# Patient Record
Sex: MALE | Race: WHITE | NOT HISPANIC OR LATINO
[De-identification: names, ages, dates, MRNs, and addresses within clinical notes are randomized per-mention and may not be internally consistent; named-entity substitution may affect disease eponyms.]

---

## 2017-02-16 ENCOUNTER — MEDICATION RENEWAL (OUTPATIENT)
Age: 82
End: 2017-02-16

## 2017-02-21 ENCOUNTER — MEDICATION RENEWAL (OUTPATIENT)
Age: 82
End: 2017-02-21

## 2017-03-13 ENCOUNTER — RX RENEWAL (OUTPATIENT)
Age: 82
End: 2017-03-13

## 2017-03-16 ENCOUNTER — MEDICATION RENEWAL (OUTPATIENT)
Age: 82
End: 2017-03-16

## 2017-04-13 ENCOUNTER — RX RENEWAL (OUTPATIENT)
Age: 82
End: 2017-04-13

## 2017-04-19 ENCOUNTER — RX RENEWAL (OUTPATIENT)
Age: 82
End: 2017-04-19

## 2017-04-20 ENCOUNTER — MEDICATION RENEWAL (OUTPATIENT)
Age: 82
End: 2017-04-20

## 2017-04-21 ENCOUNTER — RX RENEWAL (OUTPATIENT)
Age: 82
End: 2017-04-21

## 2017-04-24 ENCOUNTER — APPOINTMENT (OUTPATIENT)
Dept: CARDIOLOGY | Facility: CLINIC | Age: 82
End: 2017-04-24

## 2017-04-24 ENCOUNTER — NON-APPOINTMENT (OUTPATIENT)
Age: 82
End: 2017-04-24

## 2017-04-24 VITALS
BODY MASS INDEX: 24.76 KG/M2 | WEIGHT: 148.6 LBS | TEMPERATURE: 97.5 F | OXYGEN SATURATION: 97 % | DIASTOLIC BLOOD PRESSURE: 52 MMHG | HEIGHT: 65 IN | HEART RATE: 58 BPM | SYSTOLIC BLOOD PRESSURE: 137 MMHG

## 2017-04-25 LAB
ALBUMIN SERPL ELPH-MCNC: 4 G/DL
ALP BLD-CCNC: 71 U/L
ALT SERPL-CCNC: 13 U/L
ANION GAP SERPL CALC-SCNC: 23 MMOL/L
AST SERPL-CCNC: 20 U/L
BASOPHILS # BLD AUTO: 0.04 K/UL
BASOPHILS NFR BLD AUTO: 0.6 %
BILIRUB SERPL-MCNC: 1.3 MG/DL
BUN SERPL-MCNC: 26 MG/DL
CALCIUM SERPL-MCNC: 10 MG/DL
CHLORIDE SERPL-SCNC: 104 MMOL/L
CHOLEST SERPL-MCNC: 191 MG/DL
CHOLEST/HDLC SERPL: 3 RATIO
CO2 SERPL-SCNC: 21 MMOL/L
CREAT SERPL-MCNC: 1.28 MG/DL
EOSINOPHIL # BLD AUTO: 0.13 K/UL
EOSINOPHIL NFR BLD AUTO: 2 %
GLUCOSE SERPL-MCNC: 40 MG/DL
HBA1C MFR BLD HPLC: 5.8 %
HCT VFR BLD CALC: 40.9 %
HDLC SERPL-MCNC: 63 MG/DL
HGB BLD-MCNC: 13 G/DL
IMM GRANULOCYTES NFR BLD AUTO: 0.2 %
LDLC SERPL CALC-MCNC: 81 MG/DL
LYMPHOCYTES # BLD AUTO: 1.37 K/UL
LYMPHOCYTES NFR BLD AUTO: 21.5 %
MAN DIFF?: NORMAL
MCHC RBC-ENTMCNC: 30 PG
MCHC RBC-ENTMCNC: 31.8 GM/DL
MCV RBC AUTO: 94.2 FL
MONOCYTES # BLD AUTO: 0.78 K/UL
MONOCYTES NFR BLD AUTO: 12.3 %
NEUTROPHILS # BLD AUTO: 4.03 K/UL
NEUTROPHILS NFR BLD AUTO: 63.4 %
NT-PROBNP SERPL-MCNC: 136 PG/ML
PLATELET # BLD AUTO: 152 K/UL
POTASSIUM SERPL-SCNC: 4.4 MMOL/L
PROT SERPL-MCNC: 6.8 G/DL
RBC # BLD: 4.34 M/UL
RBC # FLD: 14 %
SODIUM SERPL-SCNC: 148 MMOL/L
TRIGL SERPL-MCNC: 233 MG/DL
TSH SERPL-ACNC: 1.68 UIU/ML
WBC # FLD AUTO: 6.36 K/UL

## 2017-05-15 ENCOUNTER — RX RENEWAL (OUTPATIENT)
Age: 82
End: 2017-05-15

## 2017-06-20 ENCOUNTER — MEDICATION RENEWAL (OUTPATIENT)
Age: 82
End: 2017-06-20

## 2017-07-24 ENCOUNTER — RX RENEWAL (OUTPATIENT)
Age: 82
End: 2017-07-24

## 2017-08-08 ENCOUNTER — MEDICATION RENEWAL (OUTPATIENT)
Age: 82
End: 2017-08-08

## 2017-08-28 ENCOUNTER — NON-APPOINTMENT (OUTPATIENT)
Age: 82
End: 2017-08-28

## 2017-08-28 ENCOUNTER — APPOINTMENT (OUTPATIENT)
Dept: CARDIOLOGY | Facility: CLINIC | Age: 82
End: 2017-08-28
Payer: MEDICARE

## 2017-08-28 VITALS
WEIGHT: 153 LBS | OXYGEN SATURATION: 100 % | DIASTOLIC BLOOD PRESSURE: 61 MMHG | SYSTOLIC BLOOD PRESSURE: 113 MMHG | BODY MASS INDEX: 25.49 KG/M2 | HEIGHT: 65 IN | HEART RATE: 48 BPM | TEMPERATURE: 97.5 F

## 2017-08-28 PROCEDURE — 93000 ELECTROCARDIOGRAM COMPLETE: CPT

## 2017-08-28 PROCEDURE — 99214 OFFICE O/P EST MOD 30 MIN: CPT

## 2017-10-10 ENCOUNTER — MEDICATION RENEWAL (OUTPATIENT)
Age: 82
End: 2017-10-10

## 2017-10-11 ENCOUNTER — MEDICATION RENEWAL (OUTPATIENT)
Age: 82
End: 2017-10-11

## 2017-10-18 ENCOUNTER — RX RENEWAL (OUTPATIENT)
Age: 82
End: 2017-10-18

## 2017-11-06 ENCOUNTER — APPOINTMENT (OUTPATIENT)
Dept: CT IMAGING | Facility: IMAGING CENTER | Age: 82
End: 2017-11-06
Payer: MEDICARE

## 2017-11-06 ENCOUNTER — APPOINTMENT (OUTPATIENT)
Dept: NUCLEAR MEDICINE | Facility: IMAGING CENTER | Age: 82
End: 2017-11-06
Payer: MEDICARE

## 2017-11-06 ENCOUNTER — OUTPATIENT (OUTPATIENT)
Dept: OUTPATIENT SERVICES | Facility: HOSPITAL | Age: 82
LOS: 1 days | End: 2017-11-06
Payer: MEDICARE

## 2017-11-06 DIAGNOSIS — Z00.8 ENCOUNTER FOR OTHER GENERAL EXAMINATION: ICD-10-CM

## 2017-11-06 PROCEDURE — 78306 BONE IMAGING WHOLE BODY: CPT | Mod: 26

## 2017-11-06 PROCEDURE — 78306 BONE IMAGING WHOLE BODY: CPT

## 2017-11-06 PROCEDURE — 74177 CT ABD & PELVIS W/CONTRAST: CPT | Mod: 26

## 2017-11-06 PROCEDURE — 78999 UNLISTED MISC PX DX NUC MED: CPT

## 2017-11-06 PROCEDURE — 78320: CPT | Mod: 26

## 2017-11-06 PROCEDURE — A9561: CPT

## 2017-11-06 PROCEDURE — 74177 CT ABD & PELVIS W/CONTRAST: CPT

## 2017-11-08 ENCOUNTER — RX RENEWAL (OUTPATIENT)
Age: 82
End: 2017-11-08

## 2017-11-21 ENCOUNTER — CLINICAL ADVICE (OUTPATIENT)
Age: 82
End: 2017-11-21

## 2017-12-03 ENCOUNTER — RX RENEWAL (OUTPATIENT)
Age: 82
End: 2017-12-03

## 2017-12-13 ENCOUNTER — MEDICATION RENEWAL (OUTPATIENT)
Age: 82
End: 2017-12-13

## 2017-12-19 ENCOUNTER — RX RENEWAL (OUTPATIENT)
Age: 82
End: 2017-12-19

## 2018-02-07 ENCOUNTER — RX RENEWAL (OUTPATIENT)
Age: 83
End: 2018-02-07

## 2018-02-20 ENCOUNTER — NON-APPOINTMENT (OUTPATIENT)
Age: 83
End: 2018-02-20

## 2018-02-20 ENCOUNTER — APPOINTMENT (OUTPATIENT)
Dept: CARDIOLOGY | Facility: CLINIC | Age: 83
End: 2018-02-20
Payer: MEDICARE

## 2018-02-20 VITALS
DIASTOLIC BLOOD PRESSURE: 61 MMHG | BODY MASS INDEX: 25.66 KG/M2 | OXYGEN SATURATION: 97 % | HEIGHT: 65 IN | SYSTOLIC BLOOD PRESSURE: 129 MMHG | HEART RATE: 63 BPM | TEMPERATURE: 98.1 F | WEIGHT: 154 LBS

## 2018-02-20 PROCEDURE — 36415 COLL VENOUS BLD VENIPUNCTURE: CPT

## 2018-02-20 PROCEDURE — 93000 ELECTROCARDIOGRAM COMPLETE: CPT

## 2018-02-20 PROCEDURE — 99215 OFFICE O/P EST HI 40 MIN: CPT

## 2018-02-21 ENCOUNTER — RX RENEWAL (OUTPATIENT)
Age: 83
End: 2018-02-21

## 2018-02-21 LAB
ALBUMIN SERPL ELPH-MCNC: 4 G/DL
ALP BLD-CCNC: 83 U/L
ALT SERPL-CCNC: 10 U/L
ANION GAP SERPL CALC-SCNC: 16 MMOL/L
AST SERPL-CCNC: 15 U/L
BILIRUB SERPL-MCNC: 1.2 MG/DL
BUN SERPL-MCNC: 32 MG/DL
CALCIUM SERPL-MCNC: 9.9 MG/DL
CHLORIDE SERPL-SCNC: 106 MMOL/L
CO2 SERPL-SCNC: 22 MMOL/L
CREAT SERPL-MCNC: 1.29 MG/DL
POTASSIUM SERPL-SCNC: 4.5 MMOL/L
PROT SERPL-MCNC: 6.1 G/DL
SODIUM SERPL-SCNC: 144 MMOL/L

## 2018-02-22 ENCOUNTER — NON-APPOINTMENT (OUTPATIENT)
Age: 83
End: 2018-02-22

## 2018-02-22 LAB
BASOPHILS # BLD AUTO: 0.03 K/UL
BASOPHILS NFR BLD AUTO: 0.5 %
CHOLEST SERPL-MCNC: 157 MG/DL
CHOLEST/HDLC SERPL: 2.7 RATIO
EOSINOPHIL # BLD AUTO: 0.13 K/UL
EOSINOPHIL NFR BLD AUTO: 2 %
GLUCOSE SERPL-MCNC: 106 MG/DL
HBA1C MFR BLD HPLC: 5.9 %
HCT VFR BLD CALC: 35.7 %
HDLC SERPL-MCNC: 58 MG/DL
HGB BLD-MCNC: 11.5 G/DL
IMM GRANULOCYTES NFR BLD AUTO: 0.2 %
LDLC SERPL CALC-MCNC: 46 MG/DL
LYMPHOCYTES # BLD AUTO: 1.22 K/UL
LYMPHOCYTES NFR BLD AUTO: 18.8 %
MAN DIFF?: NORMAL
MCHC RBC-ENTMCNC: 29.9 PG
MCHC RBC-ENTMCNC: 32.2 GM/DL
MCV RBC AUTO: 93 FL
MONOCYTES # BLD AUTO: 0.7 K/UL
MONOCYTES NFR BLD AUTO: 10.8 %
NEUTROPHILS # BLD AUTO: 4.4 K/UL
NEUTROPHILS NFR BLD AUTO: 67.7 %
NT-PROBNP SERPL-MCNC: 655 PG/ML
PLATELET # BLD AUTO: 142 K/UL
RBC # BLD: 3.84 M/UL
RBC # FLD: 14.5 %
TRIGL SERPL-MCNC: 267 MG/DL
TSH SERPL-ACNC: 1.52 UIU/ML
WBC # FLD AUTO: 6.49 K/UL

## 2018-02-26 ENCOUNTER — RX RENEWAL (OUTPATIENT)
Age: 83
End: 2018-02-26

## 2018-03-08 ENCOUNTER — APPOINTMENT (OUTPATIENT)
Dept: CARDIOLOGY | Facility: CLINIC | Age: 83
End: 2018-03-08
Payer: MEDICARE

## 2018-03-08 VITALS
OXYGEN SATURATION: 98 % | WEIGHT: 156 LBS | DIASTOLIC BLOOD PRESSURE: 65 MMHG | SYSTOLIC BLOOD PRESSURE: 138 MMHG | HEIGHT: 65 IN | HEART RATE: 62 BPM | BODY MASS INDEX: 25.99 KG/M2 | TEMPERATURE: 97.6 F

## 2018-03-08 PROCEDURE — 99214 OFFICE O/P EST MOD 30 MIN: CPT

## 2018-03-20 ENCOUNTER — APPOINTMENT (OUTPATIENT)
Dept: CARDIOLOGY | Facility: CLINIC | Age: 83
End: 2018-03-20
Payer: MEDICARE

## 2018-03-20 ENCOUNTER — NON-APPOINTMENT (OUTPATIENT)
Age: 83
End: 2018-03-20

## 2018-03-20 VITALS
OXYGEN SATURATION: 99 % | DIASTOLIC BLOOD PRESSURE: 65 MMHG | WEIGHT: 152 LBS | TEMPERATURE: 98.3 F | BODY MASS INDEX: 25.29 KG/M2 | SYSTOLIC BLOOD PRESSURE: 125 MMHG | HEART RATE: 63 BPM

## 2018-03-20 PROCEDURE — 93000 ELECTROCARDIOGRAM COMPLETE: CPT

## 2018-03-20 PROCEDURE — 36415 COLL VENOUS BLD VENIPUNCTURE: CPT

## 2018-03-20 PROCEDURE — 99214 OFFICE O/P EST MOD 30 MIN: CPT

## 2018-03-21 ENCOUNTER — CLINICAL ADVICE (OUTPATIENT)
Age: 83
End: 2018-03-21

## 2018-03-21 LAB
ALBUMIN SERPL ELPH-MCNC: 4.1 G/DL
ALP BLD-CCNC: 84 U/L
ALT SERPL-CCNC: 12 U/L
ANION GAP SERPL CALC-SCNC: 14 MMOL/L
AST SERPL-CCNC: 14 U/L
BASOPHILS # BLD AUTO: 0.03 K/UL
BASOPHILS NFR BLD AUTO: 0.4 %
BILIRUB SERPL-MCNC: 0.8 MG/DL
BUN SERPL-MCNC: 43 MG/DL
CALCIUM SERPL-MCNC: 9.3 MG/DL
CHLORIDE SERPL-SCNC: 103 MMOL/L
CO2 SERPL-SCNC: 22 MMOL/L
CREAT SERPL-MCNC: 1.71 MG/DL
EOSINOPHIL # BLD AUTO: 0.12 K/UL
EOSINOPHIL NFR BLD AUTO: 1.7 %
HCT VFR BLD CALC: 33.7 %
HGB BLD-MCNC: 11.1 G/DL
IMM GRANULOCYTES NFR BLD AUTO: 0.3 %
LYMPHOCYTES # BLD AUTO: 1.21 K/UL
LYMPHOCYTES NFR BLD AUTO: 17 %
MAN DIFF?: NORMAL
MCHC RBC-ENTMCNC: 30.2 PG
MCHC RBC-ENTMCNC: 32.9 GM/DL
MCV RBC AUTO: 91.8 FL
MONOCYTES # BLD AUTO: 0.87 K/UL
MONOCYTES NFR BLD AUTO: 12.2 %
NEUTROPHILS # BLD AUTO: 4.87 K/UL
NEUTROPHILS NFR BLD AUTO: 68.4 %
PLATELET # BLD AUTO: 142 K/UL
POTASSIUM SERPL-SCNC: 5.7 MMOL/L
PROT SERPL-MCNC: 6.6 G/DL
RBC # BLD: 3.67 M/UL
RBC # FLD: 14.1 %
SODIUM SERPL-SCNC: 139 MMOL/L
WBC # FLD AUTO: 7.12 K/UL

## 2018-04-09 ENCOUNTER — NON-APPOINTMENT (OUTPATIENT)
Age: 83
End: 2018-04-09

## 2018-04-09 ENCOUNTER — APPOINTMENT (OUTPATIENT)
Dept: CARDIOLOGY | Facility: CLINIC | Age: 83
End: 2018-04-09
Payer: MEDICARE

## 2018-04-09 VITALS
OXYGEN SATURATION: 96 % | DIASTOLIC BLOOD PRESSURE: 64 MMHG | BODY MASS INDEX: 26.82 KG/M2 | HEART RATE: 69 BPM | SYSTOLIC BLOOD PRESSURE: 127 MMHG | HEIGHT: 65 IN | WEIGHT: 161 LBS

## 2018-04-09 VITALS — HEART RATE: 70 BPM | SYSTOLIC BLOOD PRESSURE: 120 MMHG | DIASTOLIC BLOOD PRESSURE: 70 MMHG

## 2018-04-09 VITALS — DIASTOLIC BLOOD PRESSURE: 64 MMHG | HEART RATE: 70 BPM | SYSTOLIC BLOOD PRESSURE: 110 MMHG

## 2018-04-09 PROCEDURE — 93000 ELECTROCARDIOGRAM COMPLETE: CPT

## 2018-04-09 PROCEDURE — 99214 OFFICE O/P EST MOD 30 MIN: CPT

## 2018-04-09 PROCEDURE — 36415 COLL VENOUS BLD VENIPUNCTURE: CPT

## 2018-04-10 LAB
ALBUMIN SERPL ELPH-MCNC: 3.1 G/DL
ALP BLD-CCNC: 86 U/L
ALT SERPL-CCNC: 16 U/L
ANION GAP SERPL CALC-SCNC: 18 MMOL/L
AST SERPL-CCNC: 20 U/L
BASOPHILS # BLD AUTO: 0.02 K/UL
BASOPHILS NFR BLD AUTO: 0.2 %
BILIRUB SERPL-MCNC: 1.2 MG/DL
BUN SERPL-MCNC: 30 MG/DL
CALCIUM SERPL-MCNC: 9.4 MG/DL
CHLORIDE SERPL-SCNC: 102 MMOL/L
CO2 SERPL-SCNC: 19 MMOL/L
CREAT SERPL-MCNC: 1.26 MG/DL
EOSINOPHIL # BLD AUTO: 0.2 K/UL
EOSINOPHIL NFR BLD AUTO: 2.3 %
GLUCOSE SERPL-MCNC: 99 MG/DL
HCT VFR BLD CALC: 33.3 %
HGB BLD-MCNC: 10.2 G/DL
IMM GRANULOCYTES NFR BLD AUTO: 0.5 %
LYMPHOCYTES # BLD AUTO: 1.03 K/UL
LYMPHOCYTES NFR BLD AUTO: 12 %
MAN DIFF?: NORMAL
MCHC RBC-ENTMCNC: 29 PG
MCHC RBC-ENTMCNC: 30.6 GM/DL
MCV RBC AUTO: 94.6 FL
MONOCYTES # BLD AUTO: 1.01 K/UL
MONOCYTES NFR BLD AUTO: 11.8 %
NEUTROPHILS # BLD AUTO: 6.29 K/UL
NEUTROPHILS NFR BLD AUTO: 73.2 %
NT-PROBNP SERPL-MCNC: 1718 PG/ML
PLATELET # BLD AUTO: 232 K/UL
POTASSIUM SERPL-SCNC: 5.1 MMOL/L
PROT SERPL-MCNC: 6.5 G/DL
RBC # BLD: 3.52 M/UL
RBC # FLD: 14.5 %
SODIUM SERPL-SCNC: 139 MMOL/L
WBC # FLD AUTO: 8.59 K/UL

## 2018-04-12 RX ORDER — AZTREONAM 2 G
1 VIAL (EA) INJECTION
Qty: 0 | Refills: 0 | COMMUNITY
Start: 2018-04-12

## 2018-04-13 ENCOUNTER — MEDICATION RENEWAL (OUTPATIENT)
Age: 83
End: 2018-04-13

## 2018-04-14 ENCOUNTER — INPATIENT (INPATIENT)
Facility: HOSPITAL | Age: 83
LOS: 4 days | Discharge: HOME | DRG: 291 | End: 2018-04-19
Attending: INTERNAL MEDICINE | Admitting: INTERNAL MEDICINE
Payer: MEDICARE

## 2018-04-14 VITALS
OXYGEN SATURATION: 96 % | DIASTOLIC BLOOD PRESSURE: 66 MMHG | HEART RATE: 96 BPM | SYSTOLIC BLOOD PRESSURE: 125 MMHG | RESPIRATION RATE: 28 BRPM

## 2018-04-14 DIAGNOSIS — J96.01 ACUTE RESPIRATORY FAILURE WITH HYPOXIA: ICD-10-CM

## 2018-04-14 DIAGNOSIS — E78.5 HYPERLIPIDEMIA, UNSPECIFIED: ICD-10-CM

## 2018-04-14 DIAGNOSIS — I50.9 HEART FAILURE, UNSPECIFIED: ICD-10-CM

## 2018-04-14 DIAGNOSIS — C79.9 SECONDARY MALIGNANT NEOPLASM OF UNSPECIFIED SITE: ICD-10-CM

## 2018-04-14 DIAGNOSIS — J98.8 OTHER SPECIFIED RESPIRATORY DISORDERS: ICD-10-CM

## 2018-04-14 DIAGNOSIS — E44.0 MODERATE PROTEIN-CALORIE MALNUTRITION: ICD-10-CM

## 2018-04-14 DIAGNOSIS — N17.9 ACUTE KIDNEY FAILURE, UNSPECIFIED: ICD-10-CM

## 2018-04-14 DIAGNOSIS — R14.0 ABDOMINAL DISTENSION (GASEOUS): ICD-10-CM

## 2018-04-14 LAB
ALBUMIN SERPL ELPH-MCNC: 3 G/DL — LOW (ref 3.3–5)
ALP SERPL-CCNC: 95 U/L — SIGNIFICANT CHANGE UP (ref 40–120)
ALT FLD-CCNC: 26 U/L RC — SIGNIFICANT CHANGE UP (ref 10–45)
ANION GAP SERPL CALC-SCNC: 11 MMOL/L — SIGNIFICANT CHANGE UP (ref 5–17)
APPEARANCE UR: CLEAR — SIGNIFICANT CHANGE UP
AST SERPL-CCNC: 41 U/L — HIGH (ref 10–40)
BASE EXCESS BLDV CALC-SCNC: -0.1 MMOL/L — SIGNIFICANT CHANGE UP (ref -2–2)
BASOPHILS # BLD AUTO: 0 K/UL — SIGNIFICANT CHANGE UP (ref 0–0.2)
BASOPHILS NFR BLD AUTO: 0.3 % — SIGNIFICANT CHANGE UP (ref 0–2)
BILIRUB SERPL-MCNC: 0.4 MG/DL — SIGNIFICANT CHANGE UP (ref 0.2–1.2)
BILIRUB UR-MCNC: NEGATIVE — SIGNIFICANT CHANGE UP
BUN SERPL-MCNC: 28 MG/DL — HIGH (ref 7–23)
CA-I SERPL-SCNC: 1.11 MMOL/L — LOW (ref 1.12–1.3)
CALCIUM SERPL-MCNC: 8.6 MG/DL — SIGNIFICANT CHANGE UP (ref 8.4–10.5)
CHLORIDE BLDV-SCNC: 111 MMOL/L — HIGH (ref 96–108)
CHLORIDE SERPL-SCNC: 105 MMOL/L — SIGNIFICANT CHANGE UP (ref 96–108)
CK MB BLD-MCNC: 1.7 % — SIGNIFICANT CHANGE UP (ref 0–3.5)
CK MB CFR SERPL CALC: 2.5 NG/ML — SIGNIFICANT CHANGE UP (ref 0–6.7)
CK SERPL-CCNC: 143 U/L — SIGNIFICANT CHANGE UP (ref 30–200)
CO2 BLDV-SCNC: 28 MMOL/L — SIGNIFICANT CHANGE UP (ref 22–30)
CO2 SERPL-SCNC: 23 MMOL/L — SIGNIFICANT CHANGE UP (ref 22–31)
COLOR SPEC: YELLOW — SIGNIFICANT CHANGE UP
CREAT SERPL-MCNC: 1.42 MG/DL — HIGH (ref 0.5–1.3)
DIFF PNL FLD: ABNORMAL
EOSINOPHIL # BLD AUTO: 0.1 K/UL — SIGNIFICANT CHANGE UP (ref 0–0.5)
EOSINOPHIL NFR BLD AUTO: 2.5 % — SIGNIFICANT CHANGE UP (ref 0–6)
FLUAV H1 2009 PAND RNA SPEC QL NAA+PROBE: SIGNIFICANT CHANGE UP
GAS PNL BLDV: 131 MMOL/L — LOW (ref 136–145)
GAS PNL BLDV: SIGNIFICANT CHANGE UP
GAS PNL BLDV: SIGNIFICANT CHANGE UP
GLUCOSE BLDV-MCNC: 149 MG/DL — HIGH (ref 70–99)
GLUCOSE SERPL-MCNC: 156 MG/DL — HIGH (ref 70–99)
GLUCOSE UR QL: NEGATIVE MG/DL — SIGNIFICANT CHANGE UP
HCO3 BLDV-SCNC: 26 MMOL/L — SIGNIFICANT CHANGE UP (ref 21–29)
HCT VFR BLD CALC: 33.7 % — LOW (ref 39–50)
HCT VFR BLDA CALC: 42 % — SIGNIFICANT CHANGE UP (ref 39–50)
HGB BLD CALC-MCNC: 13.7 G/DL — SIGNIFICANT CHANGE UP (ref 13–17)
HGB BLD-MCNC: 10.8 G/DL — LOW (ref 13–17)
HOROWITZ INDEX BLDV+IHG-RTO: SIGNIFICANT CHANGE UP
HPIV3 RNA SPEC QL NAA+PROBE: DETECTED
KETONES UR-MCNC: NEGATIVE — SIGNIFICANT CHANGE UP
LACTATE BLDV-MCNC: 2.9 MMOL/L — HIGH (ref 0.7–2)
LEUKOCYTE ESTERASE UR-ACNC: ABNORMAL
LYMPHOCYTES # BLD AUTO: 1.5 K/UL — SIGNIFICANT CHANGE UP (ref 1–3.3)
LYMPHOCYTES # BLD AUTO: 31.7 % — SIGNIFICANT CHANGE UP (ref 13–44)
MCHC RBC-ENTMCNC: 30.2 PG — SIGNIFICANT CHANGE UP (ref 27–34)
MCHC RBC-ENTMCNC: 32 GM/DL — SIGNIFICANT CHANGE UP (ref 32–36)
MCV RBC AUTO: 94.4 FL — SIGNIFICANT CHANGE UP (ref 80–100)
MONOCYTES # BLD AUTO: 0.5 K/UL — SIGNIFICANT CHANGE UP (ref 0–0.9)
MONOCYTES NFR BLD AUTO: 11.4 % — SIGNIFICANT CHANGE UP (ref 2–14)
NEUTROPHILS # BLD AUTO: 2.6 K/UL — SIGNIFICANT CHANGE UP (ref 1.8–7.4)
NEUTROPHILS NFR BLD AUTO: 54.1 % — SIGNIFICANT CHANGE UP (ref 43–77)
NITRITE UR-MCNC: NEGATIVE — SIGNIFICANT CHANGE UP
NT-PROBNP SERPL-SCNC: 1229 PG/ML — HIGH (ref 0–300)
PCO2 BLDV: 52 MMHG — HIGH (ref 35–50)
PH BLDV: 7.33 — LOW (ref 7.35–7.45)
PH UR: 6.5 — SIGNIFICANT CHANGE UP (ref 5–8)
PLATELET # BLD AUTO: 215 K/UL — SIGNIFICANT CHANGE UP (ref 150–400)
PO2 BLDV: 25 MMHG — SIGNIFICANT CHANGE UP (ref 25–45)
POTASSIUM BLDV-SCNC: 3.9 MMOL/L — SIGNIFICANT CHANGE UP (ref 3.5–5)
POTASSIUM SERPL-MCNC: 4.2 MMOL/L — SIGNIFICANT CHANGE UP (ref 3.5–5.3)
POTASSIUM SERPL-SCNC: 4.2 MMOL/L — SIGNIFICANT CHANGE UP (ref 3.5–5.3)
PROT SERPL-MCNC: 6.1 G/DL — SIGNIFICANT CHANGE UP (ref 6–8.3)
PROT UR-MCNC: NEGATIVE MG/DL — SIGNIFICANT CHANGE UP
RAPID RVP RESULT: DETECTED
RBC # BLD: 3.57 M/UL — LOW (ref 4.2–5.8)
RBC # FLD: 12.8 % — SIGNIFICANT CHANGE UP (ref 10.3–14.5)
SAO2 % BLDV: 34 % — LOW (ref 67–88)
SODIUM SERPL-SCNC: 139 MMOL/L — SIGNIFICANT CHANGE UP (ref 135–145)
SP GR SPEC: 1.01 — SIGNIFICANT CHANGE UP (ref 1.01–1.02)
TROPONIN T SERPL-MCNC: 0.03 NG/ML — SIGNIFICANT CHANGE UP (ref 0–0.06)
TROPONIN T SERPL-MCNC: <0.01 NG/ML — SIGNIFICANT CHANGE UP (ref 0–0.06)
UROBILINOGEN FLD QL: NEGATIVE MG/DL — SIGNIFICANT CHANGE UP
WBC # BLD: 4.8 K/UL — SIGNIFICANT CHANGE UP (ref 3.8–10.5)
WBC # FLD AUTO: 4.8 K/UL — SIGNIFICANT CHANGE UP (ref 3.8–10.5)

## 2018-04-14 PROCEDURE — 71045 X-RAY EXAM CHEST 1 VIEW: CPT | Mod: 26

## 2018-04-14 PROCEDURE — 99285 EMERGENCY DEPT VISIT HI MDM: CPT | Mod: GC

## 2018-04-14 PROCEDURE — 71045 X-RAY EXAM CHEST 1 VIEW: CPT | Mod: 26,77

## 2018-04-14 RX ORDER — TAMSULOSIN HYDROCHLORIDE 0.4 MG/1
0.8 CAPSULE ORAL DAILY
Qty: 0 | Refills: 0 | Status: DISCONTINUED | OUTPATIENT
Start: 2018-04-14 | End: 2018-04-19

## 2018-04-14 RX ORDER — SODIUM CHLORIDE 9 MG/ML
3 INJECTION INTRAMUSCULAR; INTRAVENOUS; SUBCUTANEOUS ONCE
Qty: 0 | Refills: 0 | Status: COMPLETED | OUTPATIENT
Start: 2018-04-14 | End: 2018-04-14

## 2018-04-14 RX ORDER — ATORVASTATIN CALCIUM 80 MG/1
20 TABLET, FILM COATED ORAL AT BEDTIME
Qty: 0 | Refills: 0 | Status: DISCONTINUED | OUTPATIENT
Start: 2018-04-14 | End: 2018-04-19

## 2018-04-14 RX ORDER — ACETAMINOPHEN 500 MG
650 TABLET ORAL EVERY 6 HOURS
Qty: 0 | Refills: 0 | Status: DISCONTINUED | OUTPATIENT
Start: 2018-04-14 | End: 2018-04-19

## 2018-04-14 RX ORDER — PANTOPRAZOLE SODIUM 20 MG/1
40 TABLET, DELAYED RELEASE ORAL
Qty: 0 | Refills: 0 | Status: DISCONTINUED | OUTPATIENT
Start: 2018-04-14 | End: 2018-04-19

## 2018-04-14 RX ORDER — MULTIVIT-MIN/FERROUS GLUCONATE 9 MG/15 ML
1 LIQUID (ML) ORAL DAILY
Qty: 0 | Refills: 0 | Status: DISCONTINUED | OUTPATIENT
Start: 2018-04-14 | End: 2018-04-19

## 2018-04-14 RX ORDER — RIVAROXABAN 15 MG-20MG
15 KIT ORAL
Qty: 0 | Refills: 0 | Status: DISCONTINUED | OUTPATIENT
Start: 2018-04-14 | End: 2018-04-19

## 2018-04-14 RX ORDER — ZOLPIDEM TARTRATE 10 MG/1
5 TABLET ORAL AT BEDTIME
Qty: 0 | Refills: 0 | Status: DISCONTINUED | OUTPATIENT
Start: 2018-04-14 | End: 2018-04-14

## 2018-04-14 RX ORDER — IPRATROPIUM/ALBUTEROL SULFATE 18-103MCG
3 AEROSOL WITH ADAPTER (GRAM) INHALATION EVERY 6 HOURS
Qty: 0 | Refills: 0 | Status: DISCONTINUED | OUTPATIENT
Start: 2018-04-14 | End: 2018-04-19

## 2018-04-14 RX ORDER — HEPARIN SODIUM 5000 [USP'U]/ML
5000 INJECTION INTRAVENOUS; SUBCUTANEOUS EVERY 12 HOURS
Qty: 0 | Refills: 0 | Status: DISCONTINUED | OUTPATIENT
Start: 2018-04-14 | End: 2018-04-19

## 2018-04-14 RX ORDER — DOCUSATE SODIUM 100 MG
100 CAPSULE ORAL
Qty: 0 | Refills: 0 | Status: DISCONTINUED | OUTPATIENT
Start: 2018-04-14 | End: 2018-04-19

## 2018-04-14 RX ORDER — FUROSEMIDE 40 MG
40 TABLET ORAL ONCE
Qty: 0 | Refills: 0 | Status: COMPLETED | OUTPATIENT
Start: 2018-04-14 | End: 2018-04-14

## 2018-04-14 RX ORDER — FUROSEMIDE 40 MG
40 TABLET ORAL EVERY 12 HOURS
Qty: 0 | Refills: 0 | Status: DISCONTINUED | OUTPATIENT
Start: 2018-04-14 | End: 2018-04-16

## 2018-04-14 RX ORDER — GABAPENTIN 400 MG/1
300 CAPSULE ORAL
Qty: 0 | Refills: 0 | Status: DISCONTINUED | OUTPATIENT
Start: 2018-04-14 | End: 2018-04-19

## 2018-04-14 RX ORDER — ATENOLOL 25 MG/1
12.5 TABLET ORAL DAILY
Qty: 0 | Refills: 0 | Status: DISCONTINUED | OUTPATIENT
Start: 2018-04-14 | End: 2018-04-19

## 2018-04-14 RX ADMIN — Medication 40 MILLIGRAM(S): at 12:35

## 2018-04-14 RX ADMIN — SODIUM CHLORIDE 3 MILLILITER(S): 9 INJECTION INTRAMUSCULAR; INTRAVENOUS; SUBCUTANEOUS at 11:15

## 2018-04-14 RX ADMIN — HEPARIN SODIUM 5000 UNIT(S): 5000 INJECTION INTRAVENOUS; SUBCUTANEOUS at 18:26

## 2018-04-14 RX ADMIN — RIVAROXABAN 15 MILLIGRAM(S): KIT at 21:26

## 2018-04-14 RX ADMIN — Medication 40 MILLIGRAM(S): at 18:25

## 2018-04-14 RX ADMIN — GABAPENTIN 300 MILLIGRAM(S): 400 CAPSULE ORAL at 21:24

## 2018-04-14 RX ADMIN — ATORVASTATIN CALCIUM 20 MILLIGRAM(S): 80 TABLET, FILM COATED ORAL at 21:24

## 2018-04-14 RX ADMIN — Medication 0.62 MILLIGRAM(S): at 13:16

## 2018-04-14 RX ADMIN — Medication 0.62 MILLIGRAM(S): at 18:26

## 2018-04-14 RX ADMIN — TAMSULOSIN HYDROCHLORIDE 0.8 MILLIGRAM(S): 0.4 CAPSULE ORAL at 21:23

## 2018-04-14 NOTE — ED PROVIDER NOTE - OBJECTIVE STATEMENT
Private Physician Pacheco Tavarez, Urology, [facility]  89y male pmh  Metastatic Cancer of prostate, HLD,HTN, CAD/MI x2, on abx for uti, Pt comes to ed complains of cough past several weeks, began wheezing last night wihtout sputum, No nvdc,cp,fc,abd pain. No hx tobacco,asthma,chf, stopped diuretic 3 wk ago,without change in chronic pedal edema. Private Physician Pacheco Tavarez, Urology, [facility]  89y male pmh  Metastatic Cancer of prostate, HLD,HTN, CAD/MI x2, on abx for uti, Pt comes to ed complains of cough past several weeks, began wheezing last night wihtout sputum, No nvdc,cp,fc,abd pain. No hx tobacco,asthma,chf, stopped diuretic 3 wk ago,without change in chronic pedal edema.  primary care physician: Alex Batista

## 2018-04-14 NOTE — H&P ADULT - NSHPLABSRESULTS_GEN_ALL_CORE
LABS:                        10.8   4.8   )-----------( 215      ( 14 Apr 2018 11:53 )             33.7     04-14    139  |  105  |  28<H>  ----------------------------<  156<H>  4.2   |  23  |  1.42<H>    Ca    8.6      14 Apr 2018 11:53    TPro  6.1  /  Alb  3.0<L>  /  TBili  0.4  /  DBili  x   /  AST  41<H>  /  ALT  26  /  AlkPhos  95  04-14    CARDIAC MARKERS ( 14 Apr 2018 11:53 )  x     / <0.01 ng/mL / x     / x     / x              CAPILLARY BLOOD GLUCOSE            RADIOLOGY & ADDITIONAL STUDIES:  < from: Xray Chest 1 View AP/PA (04.14.18 @ 11:40) >      EXAM:  XR CHEST AP OR PA 1V                            PROCEDURE DATE:  04/14/2018            INTERPRETATION:    CLINICAL INDICATION: Chest pain    TECHNIQUE: AP view of the chest.    COMPARISON: None.     FINDINGS:     Cardiomediastinal silhouettewithin normal limits.   Bilateral opacities likely represents bilateral pleural effusions with   associated atelectasis and/or pneumonia.  No pneumothorax.   Degenerative changes of spine.     IMPRESSION:   Bilateral pleural effusions with associatedatelectasis and/or pneumonia.            < end of copied text >

## 2018-04-14 NOTE — H&P ADULT - ASSESSMENT
89 yo M from home with PMhx HTN, HLD, MI X 2, CHF of unspecified EF, Prostate Cx (?w/ mets), who p/w several days difficulty breathing, sob, productive cough of white sputum, increasing abdominal distention, and pedal edema.  Pt recently stopped taking diuretics 2/2 adverse effects.  In ED, pt w/ s/s fluid overload, with increasing work of breathing, s/p lasix, vasotec, and put on BiPAP.

## 2018-04-14 NOTE — H&P ADULT - HISTORY OF PRESENT ILLNESS
89 yo M from home with PMhx HTN, HLD, MI X 2, CHF of unspecified EF, Prostate Cx (?w/ mets), who p/w several days difficulty breathing, sob, productive cough of white sputum, increasing abdominal distention, and pedal edema.  Pt recently stopped taking diuretics 2/2 adverse effects.  In ED, pt w/ s/s fluid overload, with increasing work of breathing, s/p lasix, vasotec, and put on BiPAP.

## 2018-04-14 NOTE — H&P ADULT - NSHPPHYSICALEXAM_GEN_ALL_CORE
Vital Signs Last 24 Hrs  T(C): 36.8 (14 Apr 2018 11:20), Max: 36.8 (14 Apr 2018 11:20)  T(F): 98.2 (14 Apr 2018 11:20), Max: 98.2 (14 Apr 2018 11:20)  HR: 85 (14 Apr 2018 12:31) (85 - 96)  BP: 116/61 (14 Apr 2018 11:20) (116/61 - 125/66)  BP(mean): --  RR: 22 (14 Apr 2018 11:20) (22 - 28)  SpO2: 99% (14 Apr 2018 12:31) (95% - 99%)    Constitutional: ill appearing, on bipap, WN  HEENT: NC, AT  Neck:  Supple.   Respiratory:  b/l inspiratory rales, on bipap, mild tachypnea.   Cardiovascular:  RRR. systolic murmur, pedal edema, No R/G. 2+ radial pulses b/l.   Gastrointestinal: Soft, NT, distended, normoactive bs.  Extremities: WWP without cyanosis, clubbing.  Neurological:  Alert and awake.  Crude sensation intact.  No acute motor deficits.

## 2018-04-14 NOTE — ED PROVIDER NOTE - PROGRESS NOTE DETAILS
Call placed to primary care physician, Dr. Martin. primary care physician covering MD requested Dr. Yadav, he is out of town this wknd, will admit to unattached.

## 2018-04-14 NOTE — H&P ADULT - NSHPREVIEWOFSYSTEMS_GEN_ALL_CORE
REVIEW OF SYSTEMS:  CONSTITUTIONAL: (+) Malaise  EYES: No acute change in vision.  ENT:  No difficulty hearing, tinnitus, vertigo  NECK: No pain or stiffness  RESPIRATORY: sob, cough, wheezing. No hemoptysis  CARDIOVASCULAR: No palpitations, syncope  GASTROINTESTINAL: abd distention, No abdominal pain, N/V, hematemesis, diarrhea, melena, or hematochezia.  GENITOURINARY: No dysuria, frequency, hematuria  NEUROLOGICAL: No headaches, acute loss of strength, numbness, or tremors  SKIN: No rashes or lesions   LYMPH NODES: No enlarged glands  ENDOCRINE: No heat or cold intolerance  MUSCULOSKELETAL: No arthralgia, myalgia   PSYCHIATRIC: No suicidal or homicidal ideations  HEME/LYMPH: No easy bruising or bleeding   ALLERGY AND IMMUNOLOGIC: No hives or eczema

## 2018-04-14 NOTE — ED PROVIDER NOTE - MEDICAL DECISION MAKING DETAILS
Hx CAD, HTN, Hyperchol, p/w worsening SOB and LE swelling x 2 days, no CP, on exam bibasilar rales and 3+ LE edema, likely CHF exacerbation, will start Bipap, lasix, labs, admission.

## 2018-04-14 NOTE — CONSULT NOTE ADULT - SUBJECTIVE AND OBJECTIVE BOX
[facility]  CONSULT NOTE    REASON FOR CONSULT:    Asked to see this    HPI:    PMHX:  HLD (hyperlipidemia)  HTN (hypertension)  Cancer of prostate  MI, old      PSHX:      FAMILY HISTORY:      SOCIAL HISTORY:    Pulmonary Medications:   ALBUTerol/ipratropium for Nebulization 3 milliLiter(s) Nebulizer every 6 hours      Antimicrobials:      Cardiology:  ATENolol  Tablet 12.5 milliGRAM(s) Oral daily  enalaprilat Injectable 0.625 milliGRAM(s) IV Push every 6 hours  furosemide   Injectable 40 milliGRAM(s) IV Push every 12 hours  tamsulosin 0.8 milliGRAM(s) Oral daily      Other:  acetaminophen   Tablet. 650 milliGRAM(s) Oral every 6 hours PRN  atorvastatin 20 milliGRAM(s) Oral at bedtime  docusate sodium 100 milliGRAM(s) Oral two times a day  gabapentin 300 milliGRAM(s) Oral two times a day  heparin  Injectable 5000 Unit(s) SubCutaneous every 12 hours  multivitamin/minerals 1 Tablet(s) Oral daily  pantoprazole    Tablet 40 milliGRAM(s) Oral before breakfast  rivaroxaban 15 milliGRAM(s) Oral <User Schedule>  triazolam 0.25 milliGRAM(s) Oral at bedtime PRN      Allergies    penicillin (Unknown)    Intolerances        HOME MEDICATIONS:    REVIEW OF SYSTEMS:      Constitutional: Within normal limits  HEENT: Within normal limits  Neck: Within normal limits  Resp: No dyspnea at rest, dyspnea on exertion, chest congestion/wheeze, cough. stridor, sputum production, chest pain with respiration, hemoptysis  CV: No chest pain, chest pressure, chest discomfort, palpitations, lightheadedness/dizziness, syncope, lower extremity edema, inability to lay flat to sleep, awakening from sleep unable to sleep, claudication.  Extremities: Within normal limits  GI: Within normal limits  : Within normal limits   Psych/Neurological: Within normal limits  Musculoskeletal: Within normal limits  Hematologic: Within normal limits  Endocrinologic: Within normal limits   Skin: Within normal limits                                                                                                                                                                                [ ]Unable to obtain    OBJECTIVE:      ICU Vital Signs Last 24 Hrs  T(C): 36.4 (2018 19:46), Max: 36.8 (2018 11:20)  T(F): 97.6 (2018 19:46), Max: 98.2 (2018 11:20)  HR: 78 (2018 20:58) (71 - 98)  BP: 113/58 (2018 19:46) (113/58 - 125/66)  BP(mean): --  ABP: --  ABP(mean): --  RR: 18 (2018 19:46) (18 - 28)  SpO2: 98% (2018 20:58) (95% - 100%)      I&O's Detail    Daily     Daily   CAPILLARY BLOOD GLUCOSE          PHYSICAL EXAM:  General: Awake, alert, cooperative, no distress, appears stated age   Head: Atraumatic, normocephalic  Eyes: Anicteric, conjunctiva/corneas clear, EOM's intact, PERRL, both eyes               Ears: External examination WNL, both ears                Nose: Nares normal, septum midline, mucosa normal, no drainage or sinus tenderness  Throat: Mallampati Grade:     No tonsillar or pharyngeal exudates. Lips, mucosa and tongue WNL; teeth and gums WNL  Neck: Supple, symmetric, trachea midline; thyroid without enlargement/tenderness/nodules; no carotid bruit; no JVD  Back: Symmetric, no curvature, range of motion normal, no CVA tenderness  Chest wall: No tenderness or deformity  Respiratory: Respirations unlabored; Clear to auscultation and percussion bilaterally  Cardiovascular: Regular rate and rhythm, S1 S2 normal. No murmurs, rubs or gallops.   Abdomen: Soft, non-tender, non-distended. No organomegaly. No masses. Normal bowel sounds  Extremities: Warm to touch. No clubbing or cyanosis. No pedal edema.  Pulses: 2+ peripheral pulses all extremities  Skin: Normal skin color, texture and turgor. No rashes or lesions  Lymph Nodes: Cervical, supraclavicular and axillary nodes normal  Neurological: Motor and sensory examination equal and normal. A and O x 3  Psychiatry: Appropriate mood and affect.    LABS:                            10.8   4.8   )-----------( 215      ( 2018 11:53 )             33.7     04-14    139  |  105  |  28<H>  ----------------------------<  156<H>  4.2   |  23  |  1.42<H>    Ca    8.6      2018 11:53    TPro  6.1  /  Alb  3.0<L>  /  TBili  0.4  /  DBili  x   /  AST  41<H>  /  ALT  26  /  AlkPhos  95  04-14      Urinalysis Basic - ( 2018 21:55 )    Color: Yellow / Appearance: Clear / S.010 / pH: x  Gluc: x / Ketone: Negative  / Bili: Negative / Urobili: Negative mg/dL   Blood: x / Protein: Negative mg/dL / Nitrite: Negative   Leuk Esterase: Small / RBC: 14 /HPF / WBC 6 /HPF   Sq Epi: x / Non Sq Epi: 0 /HPF / Bacteria: Negative      Venous Blood Gas:   @ 11:53  7.33/52/25/26/34  VBG Lactate: 2.9        MICROBIOLOGY:     RADIOLOGY:  [ ] Reviewed and interpreted by me    CXR:      CT Scan: [facility]  CONSULT NOTE    REASON FOR CONSULT:  Dyspnea, coughing    Asked to see this 89 yo male who was brought to the ER after developing dyspnea, a cough productive of white sputum, wheezing, increased abdominal distension and increased pedal edema.  He apparently had difficulty tolerating the diuretics he was on at home, so he stopped taking them.  He felt to be fluid overloaded in the ER and was started on diuretics and he was put on bipap.  He improved and presently, bipap has been weaned off.      89 yo M from home with PMhx HTN, HLD, MI X 2, CHF of unspecified EF, Prostate Cx (?w/ mets), who p/w several days difficulty breathing, sob, productive cough of white sputum, increasing abdominal distention, and pedal edema.  Pt recently stopped taking diuretics 2/2 adverse effects.  In ED, pt w/ s/s fluid overload, with increasing work of breathing, s/p lasix, vasotec, and put on BiPAP.                 Review of Systems:  Review of Systems: REVIEW OF SYSTEMS:  CONSTITUTIONAL: (+) Malaise  EYES: No acute change in vision.  ENT:  No difficulty hearing, tinnitus, vertigo  NECK: No pain or stiffness  RESPIRATORY: sob, cough, wheezing. No hemoptysis  CARDIOVASCULAR: No palpitations, syncope  GASTROINTESTINAL: abd distention, No abdominal pain, N/V, hematemesis, diarrhea, melena, or hematochezia.  GENITOURINARY: Positive urinary frequency  NEUROLOGICAL: No headaches, acute loss of strength, numbness, or tremors  SKIN: No rashes or lesions   LYMPH NODES: No enlarged glands  ENDOCRINE: No heat or cold intolerance  MUSCULOSKELETAL: No arthralgia, myalgia   PSYCHIATRIC: No suicidal or homicidal ideations  HEME/LYMPH: No easy bruising or bleeding  ALLERGY AND IMMUNOLOGIC: No hives or eczema	        HPI:    PMHX:  HLD (hyperlipidemia)  HTN (hypertension)  Cancer of prostate  MI, old      PSHX:      FAMILY HISTORY: Cardiovascular disease      SOCIAL HISTORY: No report of smoking or heavy ETOH use    Pulmonary Medications:   ALBUTerol/ipratropium for Nebulization 3 milliLiter(s) Nebulizer every 6 hours      Antimicrobials:      Cardiology:  ATENolol  Tablet 12.5 milliGRAM(s) Oral daily  enalaprilat Injectable 0.625 milliGRAM(s) IV Push every 6 hours  furosemide   Injectable 40 milliGRAM(s) IV Push every 12 hours  tamsulosin 0.8 milliGRAM(s) Oral daily      Other:  acetaminophen   Tablet. 650 milliGRAM(s) Oral every 6 hours PRN  atorvastatin 20 milliGRAM(s) Oral at bedtime  docusate sodium 100 milliGRAM(s) Oral two times a day  gabapentin 300 milliGRAM(s) Oral two times a day  heparin  Injectable 5000 Unit(s) SubCutaneous every 12 hours  multivitamin/minerals 1 Tablet(s) Oral daily  pantoprazole    Tablet 40 milliGRAM(s) Oral before breakfast  rivaroxaban 15 milliGRAM(s) Oral <User Schedule>  triazolam 0.25 milliGRAM(s) Oral at bedtime PRN      Allergies    penicillin (Unknown)    Intolerances        HOME MEDICATIONS:    REVIEW OF SYSTEMS:      Constitutional: Within normal limits  HEENT: Within normal limits  Neck: Within normal limits  Resp: No dyspnea at rest, dyspnea on exertion, chest congestion/wheeze, cough. stridor, sputum production, chest pain with respiration, hemoptysis  CV: No chest pain, chest pressure, chest discomfort, palpitations, lightheadedness/dizziness, syncope, lower extremity edema, inability to lay flat to sleep, awakening from sleep unable to sleep, claudication.  Extremities: Within normal limits  GI: Within normal limits  : Within normal limits   Psych/Neurological: Within normal limits  Musculoskeletal: Within normal limits  Hematologic: Within normal limits  Endocrinologic: Within normal limits   Skin: Within normal limits                                                                                                                                                                                [ ]Unable to obtain    OBJECTIVE:      ICU Vital Signs Last 24 Hrs  T(C): 36.4 (2018 19:46), Max: 36.8 (2018 11:20)  T(F): 97.6 (2018 19:46), Max: 98.2 (2018 11:20)  HR: 78 (2018 20:58) (71 - 98)  BP: 113/58 (2018 19:46) (113/58 - 125/66)  BP(mean): --  ABP: --  ABP(mean): --  RR: 18 (2018 19:46) (18 - 28)  SpO2: 98% (2018 20:58) (95% - 100%)      I&O's Detail    Daily     Daily   CAPILLARY BLOOD GLUCOSE          PHYSICAL EXAM:  General: Awake, alert, cooperative, no distress, appears stated age   Head: Atraumatic, normocephalic  Eyes: Anicteric, conjunctiva/corneas clear, EOM's intact, PERRL, both eyes               Ears: External examination WNL, both ears                Nose: Nares normal, septum midline, mucosa normal, no drainage or sinus tenderness  Throat: Mallampati thGthrthathdtheth:th th4th No tonsillar or pharyngeal exudates. Dry tongue WNL; teeth and gums WNL  Neck: Supple, symmetric, trachea midline; thyroid without enlargement/tenderness/nodules; no carotid bruit; no JVD  Back: Symmetric, no curvature, range of motion normal, no CVA tenderness  Chest wall: No tenderness or deformity  Respiratory: Respirations unlabored; a few soft crackles/rhonchi at the bases  Cardiovascular: Regular rate and rhythm, S1 S2 normal. No murmurs, rubs or gallops.   Abdomen: Soft, non-tender, non-distended. No organomegaly. No masses. Normal bowel sounds  Extremities: Arthiritic changes with changes consistent with chronic vascular insufficiency, 1-2+pedal edema.  Pulses: 2+ peripheral pulses all extremities  Skin: Normal skin color, texture and turgor. No rashes or lesions  Lymph Nodes: Cervical, supraclavicular and axillary nodes normal  Neurological: Motor and sensory examination equal and normal. A and O x 3  Psychiatry: Appropriate mood and affect.    LABS:                            10.8   4.8   )-----------( 215      ( 2018 11:53 )             33.7     -14    139  |  105  |  28<H>  ----------------------------<  156<H>  4.2   |  23  |  1.42<H>    Ca    8.6      2018 11:53    TPro  6.1  /  Alb  3.0<L>  /  TBili  0.4  /  DBili  x   /  AST  41<H>  /  ALT  26  /  AlkPhos  95  -      Urinalysis Basic - ( 2018 21:55 )    Color: Yellow / Appearance: Clear / S.010 / pH: x  Gluc: x / Ketone: Negative  / Bili: Negative / Urobili: Negative mg/dL   Blood: x / Protein: Negative mg/dL / Nitrite: Negative   Leuk Esterase: Small / RBC: 14 /HPF / WBC 6 /HPF   Sq Epi: x / Non Sq Epi: 0 /HPF / Bacteria: Negative      Venous Blood Gas:   @ 11:53  7.33/52/25/26/34  VBG Lactate: 2.9        MICROBIOLOGY:  RVP positive for parainfluenza          RADIOLOGY:  [ ] Reviewed and interpreted by me    CXR:      EXAM:  XR CHEST AP OR PA 1V                            PROCEDURE DATE:  2018            INTERPRETATION:    CLINICAL INDICATION: Chest pain    TECHNIQUE: AP view of the chest.    COMPARISON: None.     FINDINGS:     Cardiomediastinal silhouettewithin normal limits.   Bilateral opacities likely represents bilateral pleural effusions with   associated atelectasis and/or pneumonia.  No pneumothorax.   Degenerative changes of spine.     IMPRESSION:   Bilateral pleural effusions with associatedatelectasis and/or pneumonia.

## 2018-04-14 NOTE — ED ADULT NURSE NOTE - OBJECTIVE STATEMENT
Patient 89 y/o male presents to Ed via amb with c/o sob. Per patients daughter patient has been coughing for several  weeks and is on abx for dx uti and today patient is wheezing and coughing with sob. Patient A&Ox3, denies chest pain  +sob, +non productive cough, no fever chills headache or dizziness, +2 bilat pedal edema present. Lungs b/l rales and  expiratory wheezing, abd soft and distended, skin warm and dry. LAC iv lock placed labs drawn and sent. Patient 89 y/o male presents to Ed via amb with c/o sob. Per patients daughter patient has been coughing for several  weeks and is on abx for dx uti and today patient is wheezing and coughing with sob. Patient A&Ox3, denies chest pain  +sob, +non productive cough, no fever chills headache or dizziness, +2 bilat pedal edema present. Lungs b/l rales and  expiratory wheezing, abd soft and distended, skin warm and dry. LAC iv lock placed labs drawn and sent.   Patient placed on bipap Epap 5, Ipap 10, O2 40%, Rate 12.

## 2018-04-14 NOTE — H&P ADULT - PROBLEM SELECTOR PLAN 1
S/s fluid overload, unspecified type  c/w aggressive IV diuresis  BiPAP   strict fluid restriction, daily weight, telemonitoring  trend cardiac enzymes  nutrition and PT  f/u am labs and replace electrolytes prn

## 2018-04-15 DIAGNOSIS — N18.3 CHRONIC KIDNEY DISEASE, STAGE 3 (MODERATE): ICD-10-CM

## 2018-04-15 DIAGNOSIS — I12.9 HYPERTENSIVE CHRONIC KIDNEY DISEASE WITH STAGE 1 THROUGH STAGE 4 CHRONIC KIDNEY DISEASE, OR UNSPECIFIED CHRONIC KIDNEY DISEASE: ICD-10-CM

## 2018-04-15 DIAGNOSIS — R60.0 LOCALIZED EDEMA: ICD-10-CM

## 2018-04-15 DIAGNOSIS — C61 MALIGNANT NEOPLASM OF PROSTATE: ICD-10-CM

## 2018-04-15 LAB
ALBUMIN SERPL ELPH-MCNC: 3.1 G/DL — LOW (ref 3.3–5)
ALP SERPL-CCNC: 78 U/L — SIGNIFICANT CHANGE UP (ref 40–120)
ALT FLD-CCNC: 21 U/L RC — SIGNIFICANT CHANGE UP (ref 10–45)
ANION GAP SERPL CALC-SCNC: 14 MMOL/L — SIGNIFICANT CHANGE UP (ref 5–17)
AST SERPL-CCNC: 30 U/L — SIGNIFICANT CHANGE UP (ref 10–40)
BILIRUB DIRECT SERPL-MCNC: 0.2 MG/DL — SIGNIFICANT CHANGE UP (ref 0–0.2)
BILIRUB INDIRECT FLD-MCNC: 0.5 MG/DL — SIGNIFICANT CHANGE UP (ref 0.2–1)
BILIRUB SERPL-MCNC: 0.7 MG/DL — SIGNIFICANT CHANGE UP (ref 0.2–1.2)
BUN SERPL-MCNC: 25 MG/DL — HIGH (ref 7–23)
CALCIUM SERPL-MCNC: 8.5 MG/DL — SIGNIFICANT CHANGE UP (ref 8.4–10.5)
CHLORIDE SERPL-SCNC: 100 MMOL/L — SIGNIFICANT CHANGE UP (ref 96–108)
CHOLEST SERPL-MCNC: 109 MG/DL — SIGNIFICANT CHANGE UP (ref 10–199)
CK MB BLD-MCNC: 0.9 % — SIGNIFICANT CHANGE UP (ref 0–3.5)
CK MB BLD-MCNC: 1.8 % — SIGNIFICANT CHANGE UP (ref 0–3.5)
CK MB CFR SERPL CALC: 2.9 NG/ML — SIGNIFICANT CHANGE UP (ref 0–6.7)
CK MB CFR SERPL CALC: 3.3 NG/ML — SIGNIFICANT CHANGE UP (ref 0–6.7)
CK SERPL-CCNC: 162 U/L — SIGNIFICANT CHANGE UP (ref 30–200)
CK SERPL-CCNC: 365 U/L — HIGH (ref 30–200)
CO2 SERPL-SCNC: 26 MMOL/L — SIGNIFICANT CHANGE UP (ref 22–31)
CREAT SERPL-MCNC: 1.48 MG/DL — HIGH (ref 0.5–1.3)
ERYTHROCYTE [SEDIMENTATION RATE] IN BLOOD: 51 MM/HR — HIGH (ref 0–20)
FERRITIN SERPL-MCNC: 743 NG/ML — HIGH (ref 30–400)
FOLATE SERPL-MCNC: >20 NG/ML — SIGNIFICANT CHANGE UP (ref 4.8–24.2)
GGT SERPL-CCNC: 19 U/L — SIGNIFICANT CHANGE UP (ref 9–50)
GLUCOSE SERPL-MCNC: 111 MG/DL — HIGH (ref 70–99)
HBA1C BLD-MCNC: 6 % — HIGH (ref 4–5.6)
HCT VFR BLD CALC: 34.4 % — LOW (ref 39–50)
HDLC SERPL-MCNC: 41 MG/DL — SIGNIFICANT CHANGE UP (ref 40–125)
HGB BLD-MCNC: 11.1 G/DL — LOW (ref 13–17)
IRON SATN MFR SERPL: 12 % — LOW (ref 16–55)
IRON SATN MFR SERPL: 27 UG/DL — LOW (ref 45–165)
LDH SERPL L TO P-CCNC: 263 U/L — HIGH (ref 50–242)
LIPID PNL WITH DIRECT LDL SERPL: 42 MG/DL — SIGNIFICANT CHANGE UP
MAGNESIUM SERPL-MCNC: 2.1 MG/DL — SIGNIFICANT CHANGE UP (ref 1.6–2.6)
MCHC RBC-ENTMCNC: 29.6 PG — SIGNIFICANT CHANGE UP (ref 27–34)
MCHC RBC-ENTMCNC: 32.3 GM/DL — SIGNIFICANT CHANGE UP (ref 32–36)
MCV RBC AUTO: 91.7 FL — SIGNIFICANT CHANGE UP (ref 80–100)
PHOSPHATE SERPL-MCNC: 2.3 MG/DL — LOW (ref 2.5–4.5)
PLATELET # BLD AUTO: 224 K/UL — SIGNIFICANT CHANGE UP (ref 150–400)
POTASSIUM SERPL-MCNC: 4.2 MMOL/L — SIGNIFICANT CHANGE UP (ref 3.5–5.3)
POTASSIUM SERPL-SCNC: 4.2 MMOL/L — SIGNIFICANT CHANGE UP (ref 3.5–5.3)
PROT SERPL-MCNC: 6.3 G/DL — SIGNIFICANT CHANGE UP (ref 6–8.3)
RBC # BLD: 3.75 M/UL — LOW (ref 4.2–5.8)
RBC # BLD: 3.75 M/UL — LOW (ref 4.2–5.8)
RBC # FLD: 14.4 % — SIGNIFICANT CHANGE UP (ref 10.3–14.5)
RETICS #: 80.3 K/UL — SIGNIFICANT CHANGE UP (ref 25–125)
RETICS/RBC NFR: 2.1 % — SIGNIFICANT CHANGE UP (ref 0.5–2.5)
SODIUM SERPL-SCNC: 140 MMOL/L — SIGNIFICANT CHANGE UP (ref 135–145)
TIBC SERPL-MCNC: 230 UG/DL — SIGNIFICANT CHANGE UP (ref 220–430)
TOTAL CHOLESTEROL/HDL RATIO MEASUREMENT: 2.7 RATIO — LOW (ref 3.4–9.6)
TRIGL SERPL-MCNC: 129 MG/DL — SIGNIFICANT CHANGE UP (ref 10–149)
TROPONIN T SERPL-MCNC: 0.02 NG/ML — SIGNIFICANT CHANGE UP (ref 0–0.06)
TROPONIN T SERPL-MCNC: <0.01 NG/ML — SIGNIFICANT CHANGE UP (ref 0–0.06)
TSH SERPL-MCNC: 0.58 UIU/ML — SIGNIFICANT CHANGE UP (ref 0.27–4.2)
UIBC SERPL-MCNC: 203 UG/DL — SIGNIFICANT CHANGE UP (ref 110–370)
URATE SERPL-MCNC: 5.5 MG/DL — SIGNIFICANT CHANGE UP (ref 3.4–8.8)
VIT B12 SERPL-MCNC: 932 PG/ML — SIGNIFICANT CHANGE UP (ref 232–1245)
WBC # BLD: 4.49 K/UL — SIGNIFICANT CHANGE UP (ref 3.8–10.5)
WBC # FLD AUTO: 4.49 K/UL — SIGNIFICANT CHANGE UP (ref 3.8–10.5)

## 2018-04-15 PROCEDURE — 71250 CT THORAX DX C-: CPT | Mod: 26

## 2018-04-15 RX ORDER — LISINOPRIL 2.5 MG/1
2.5 TABLET ORAL DAILY
Qty: 0 | Refills: 0 | Status: DISCONTINUED | OUTPATIENT
Start: 2018-04-15 | End: 2018-04-16

## 2018-04-15 RX ORDER — BUDESONIDE, MICRONIZED 100 %
0.5 POWDER (GRAM) MISCELLANEOUS EVERY 12 HOURS
Qty: 0 | Refills: 0 | Status: DISCONTINUED | OUTPATIENT
Start: 2018-04-15 | End: 2018-04-19

## 2018-04-15 RX ADMIN — HEPARIN SODIUM 5000 UNIT(S): 5000 INJECTION INTRAVENOUS; SUBCUTANEOUS at 06:22

## 2018-04-15 RX ADMIN — Medication 3 MILLILITER(S): at 00:31

## 2018-04-15 RX ADMIN — RIVAROXABAN 15 MILLIGRAM(S): KIT at 21:44

## 2018-04-15 RX ADMIN — Medication 0.62 MILLIGRAM(S): at 00:31

## 2018-04-15 RX ADMIN — TAMSULOSIN HYDROCHLORIDE 0.8 MILLIGRAM(S): 0.4 CAPSULE ORAL at 13:42

## 2018-04-15 RX ADMIN — Medication 40 MILLIGRAM(S): at 18:34

## 2018-04-15 RX ADMIN — Medication 3 MILLILITER(S): at 06:22

## 2018-04-15 RX ADMIN — ATENOLOL 12.5 MILLIGRAM(S): 25 TABLET ORAL at 06:22

## 2018-04-15 RX ADMIN — HEPARIN SODIUM 5000 UNIT(S): 5000 INJECTION INTRAVENOUS; SUBCUTANEOUS at 18:34

## 2018-04-15 RX ADMIN — Medication 3 MILLILITER(S): at 18:34

## 2018-04-15 RX ADMIN — Medication 100 MILLIGRAM(S): at 18:33

## 2018-04-15 RX ADMIN — Medication 0.62 MILLIGRAM(S): at 08:18

## 2018-04-15 RX ADMIN — Medication 0.25 MILLIGRAM(S): at 21:44

## 2018-04-15 RX ADMIN — ATORVASTATIN CALCIUM 20 MILLIGRAM(S): 80 TABLET, FILM COATED ORAL at 21:44

## 2018-04-15 RX ADMIN — Medication 0.5 MILLIGRAM(S): at 08:17

## 2018-04-15 RX ADMIN — Medication 3 MILLILITER(S): at 13:43

## 2018-04-15 RX ADMIN — GABAPENTIN 300 MILLIGRAM(S): 400 CAPSULE ORAL at 18:34

## 2018-04-15 RX ADMIN — PANTOPRAZOLE SODIUM 40 MILLIGRAM(S): 20 TABLET, DELAYED RELEASE ORAL at 06:22

## 2018-04-15 RX ADMIN — GABAPENTIN 300 MILLIGRAM(S): 400 CAPSULE ORAL at 06:22

## 2018-04-15 RX ADMIN — Medication 40 MILLIGRAM(S): at 06:22

## 2018-04-15 RX ADMIN — Medication 0.5 MILLIGRAM(S): at 18:36

## 2018-04-15 RX ADMIN — Medication 1 TABLET(S): at 13:42

## 2018-04-15 RX ADMIN — Medication 3 MILLILITER(S): at 23:10

## 2018-04-15 NOTE — PHYSICAL THERAPY INITIAL EVALUATION ADULT - ADDITIONAL COMMENTS
Patient was living at home with spouse. Has 8 steps to enter the bedroom, however, will reside on 1st level post discharge. Patient is independent in ambulation using rollator, required assist with ADLs.

## 2018-04-15 NOTE — PHYSICAL THERAPY INITIAL EVALUATION ADULT - BALANCE TRAINING, PT EVAL
Goal- Patient will demonstrate good- balance in 2-3 weeks to improve performance and safety of standing dynamic activities, transfers and ambulation

## 2018-04-15 NOTE — DIETITIAN INITIAL EVALUATION ADULT. - OTHER INFO
Pt seen for consult- nutrition assessment, education. Pt's daughter reports good appetite and fair intake in house; Reports eating less on admission however consumed most of breakfast this morning (4/15); declines further nutrition supplements at this time; states wife will bring food from home; RD to monitor intake as appropriate. [id number]. Current op=326.9pounds; ?wt gain vs fluid shifts. Pt reports possible slight wt gain recently. Denies chewing/swallowing difficulties, N+V, constipation or diarrhea; Last BM x1 (4/14). NKFA. Pt seen for consult- nutrition assessment, education. Pt's daughter reports good appetite and fair intake in house; Reports eating less on admission however consumed most of breakfast this morning (4/15); declines further nutrition supplements at this time; states wife will bring food from home; RD to monitor intake as appropriate. [id number]. Current az=753.9pounds; Pt noted with 3+ edema adrian. legs. ?wt gain vs fluid shifts. Pt reports possible slight wt gain recently. Denies chewing/swallowing difficulties, N+V, constipation or diarrhea; Last BM x1 (4/14). NKFA.

## 2018-04-15 NOTE — PROGRESS NOTE ADULT - SUBJECTIVE AND OBJECTIVE BOX
Follow-up Pulm Progress Note    The patient was seen and examined. Notes reviewed and discussed with staff/team as applicable      Doing better. Off BIPAP. Less dyspnea. Family updated.     Denies: increased SOB, increased cough, colored phlegm, hemoptysis, N/V/D, neck stiffness, dysuria  ROS- L rib pain after cough    Vital Signs Last 24 Hrs  T(C): 36.7 (15 Apr 2018 05:22), Max: 36.8 (2018 11:20)  T(F): 98 (15 Apr 2018 05:22), Max: 98.2 (2018 11:20)  HR: 80 (15 Apr 2018 05:45) (71 - 98)  BP: 121/70 (15 Apr 2018 05:22) (113/58 - 125/66)  BP(mean): --  RR: 18 (15 Apr 2018 05:22) (18 - 28)  SpO2: 96% (15 Apr 2018 05:45) (95% - 100%)      Medications:  MEDICATIONS  (STANDING):  ALBUTerol/ipratropium for Nebulization 3 milliLiter(s) Nebulizer every 6 hours  ATENolol  Tablet 12.5 milliGRAM(s) Oral daily  atorvastatin 20 milliGRAM(s) Oral at bedtime  buDESOnide   0.5 milliGRAM(s) Respule 0.5 milliGRAM(s) Inhalation every 12 hours  docusate sodium 100 milliGRAM(s) Oral two times a day  enalaprilat Injectable 0.625 milliGRAM(s) IV Push every 6 hours  furosemide   Injectable 40 milliGRAM(s) IV Push every 12 hours  gabapentin 300 milliGRAM(s) Oral two times a day  heparin  Injectable 5000 Unit(s) SubCutaneous every 12 hours  multivitamin/minerals 1 Tablet(s) Oral daily  pantoprazole    Tablet 40 milliGRAM(s) Oral before breakfast  rivaroxaban 15 milliGRAM(s) Oral <User Schedule>  tamsulosin 0.8 milliGRAM(s) Oral daily    MEDICATIONS  (PRN):  acetaminophen   Tablet. 650 milliGRAM(s) Oral every 6 hours PRN Mild Pain (1 - 3)  triazolam 0.25 milliGRAM(s) Oral at bedtime PRN Insomnia      Allergies    penicillin (Unknown)      Physical Examination:    Pleasant elderly white man, NAD  Neck: no JVD, LAD, accessory muscle use  PULM: Decreased BS bases bilaterally. no wheezes  CVS: Regular rate and rhythm, S1S2, no murmurs, rubs, or gallops  Abdomen: soft, NT  Extremities: bilateral LE edema  Neuro: non focal      LABS:                        10.8   4.8   )-----------( 215      ( 2018 11:53 )             33.7         139  |  105  |  28<H>  ----------------------------<  156<H>  4.2   |  23  |  1.42<H>    Ca    8.6      2018 11:53    TPro  6.1  /  Alb  3.0<L>  /  TBili  0.4  /  DBili  x   /  AST  41<H>  /  ALT  26  /  AlkPhos  95  14      CARDIAC MARKERS ( 15 Apr 2018 01:47 )  x     / 0.02 ng/mL / 162 U/L / x     / 2.9 ng/mL  CARDIAC MARKERS ( 2018 19:25 )  x     / 0.03 ng/mL / 143 U/L / x     / 2.5 ng/mL  CARDIAC MARKERS ( 2018 11:53 )  x     / <0.01 ng/mL / x     / x     / x        Urinalysis Basic - ( 2018 21:55 )    Color: Yellow / Appearance: Clear / S.010 / pH: x  Gluc: x / Ketone: Negative  / Bili: Negative / Urobili: Negative mg/dL   Blood: x / Protein: Negative mg/dL / Nitrite: Negative   Leuk Esterase: Small / RBC: 14 /HPF / WBC 6 /HPF   Sq Epi: x / Non Sq Epi: 0 /HPF / Bacteria: Negative        Serum Pro-Brain Natriuretic Peptide: 1229 pg/mL (18 @ 11:53)

## 2018-04-15 NOTE — DIETITIAN INITIAL EVALUATION ADULT. - NS AS NUTRI INTERV ED CONTENT
Recommended modifications/Nutrition relationship to health/disease/1) Verbal and written instruction provided to pt and daughter on heart healthy, low sodium nutrition therapy. 2) Discussed high sodium food sources, daily wt, reading nutrition labels, low sodium cooking./Purpose of the nutrition education

## 2018-04-15 NOTE — PROGRESS NOTE ADULT - ASSESSMENT
Parainfluenza infection complicated by CHF and bronchial hyperreactivity.  The patient had been hypoxic requiring bipap, but he has improved with diuresis and nebulizer rx, currently, he is tolerating NC O2.    Supplemental O2 to maintain saturation 92% or above watching for evidence of CO2 retention.  Cautious diuresis and CV management.  Continue duoneb Q6  Budesonide 0.5 mg via neb bid.  Elevating HOB.   Low threshold for fever work up and initiation of antibiotic therapy, but non toxic and observing off specific abx for lung process  Following CxR- improved  Standard GI/DVT prophylaxis.      Fausto Espinoza MD  Pulmonary Medicine  [phone]

## 2018-04-15 NOTE — DIETITIAN INITIAL EVALUATION ADULT. - NS AS NUTRI INTERV MEALS SNACK
General/healthful diet/1) Continue with DASH/TLC, Kosher diet 2) Encourage PO intake 3) Provide and honor food preferences

## 2018-04-15 NOTE — DIETITIAN INITIAL EVALUATION ADULT. - ORAL INTAKE PTA
Pt reports good appetite and intake PTA. Pt lives with wife who cooks meals; Typical diet: toast and coffee for breakfast; eggs, yogurt or tuna for lunch; chicken, soup, salad, and various side dishes for dinner. Denies taking supplements or vitamins PTA./good

## 2018-04-15 NOTE — CONSULT NOTE ADULT - ATTENDING COMMENTS
[facility] NEPHROLOGY  Driss Bowling M.D.  Seven Polo D.O.  Darlene Kothari M.D.  Ritu Ibarra, MSN, ANP-C    Telephone: [phone]  Facsimile: [phone]    [address] [place] [address]

## 2018-04-15 NOTE — CONSULT NOTE ADULT - SUBJECTIVE AND OBJECTIVE BOX
[facility] NEPHROLOGY- CONSULTATION NOTE    89 year old male with history of below presents with SOB due to parainfluenza and volume overload. Nephrology consulted for elevated Scr.    Information obtained from daughter. Patient with h/o prostate CA for which he follows with urology (Dr. Bergman) with recent dysuria for which he was prescribed bactrim with urine culture pending. Patient also with prior history of kidney disease but does not follow with nephrology. Patient was recently told by his cardiologist that his Scr had improved compared to prior labs on most recent labs.    REVIEW OF SYSTEMS:  Gen: no changes in weight  HEENT: no rhinorrhea  Neck: no sore throat  Cards: no chest pain  Resp: + dyspnea  GI: no nausea or vomiting or diarrhea  : no dysuria or hematuria  Vascular: + LE edema  Derm: no rashes  Neuro: no numbness/tingling    penicillin (Unknown)      Home Medications Reviewed  Hospital Medications:   MEDICATIONS  (STANDING):  ALBUTerol/ipratropium for Nebulization 3 milliLiter(s) Nebulizer every 6 hours  ATENolol  Tablet 12.5 milliGRAM(s) Oral daily  atorvastatin 20 milliGRAM(s) Oral at bedtime  buDESOnide   0.5 milliGRAM(s) Respule 0.5 milliGRAM(s) Inhalation every 12 hours  docusate sodium 100 milliGRAM(s) Oral two times a day  enalaprilat Injectable 0.625 milliGRAM(s) IV Push every 6 hours  furosemide   Injectable 40 milliGRAM(s) IV Push every 12 hours  gabapentin 300 milliGRAM(s) Oral two times a day  heparin  Injectable 5000 Unit(s) SubCutaneous every 12 hours  multivitamin/minerals 1 Tablet(s) Oral daily  pantoprazole    Tablet 40 milliGRAM(s) Oral before breakfast  rivaroxaban 15 milliGRAM(s) Oral <User Schedule>  tamsulosin 0.8 milliGRAM(s) Oral daily      PAST MEDICAL & SURGICAL HISTORY:  HLD (hyperlipidemia)  HTN (hypertension)  Cancer of prostate  MI, old:  &amp;       FAMILY HISTORY:  Noncontributory    SOCIAL HISTORY:  Denies toxic substance use     VITALS:  T(F): 98 (04-15-18 @ 05:22), Max: 98 (18 @ 16:57)  HR: 73 (04-15-18 @ 10:54)  BP: 121/70 (04-15-18 @ 05:22)  RR: 18 (04-15-18 @ 05:22)  SpO2: 94% (04-15-18 @ 10:54)  Wt(kg): --    Height (cm): 172.72 (04-15 @ 05:22)  Weight (kg): 74.8 (04-15 @ 05:22)  BMI (kg/m2): 25.1 (04-15 @ 05:22)  BSA (m2): 1.88 (04-15 @ 05:22)    PHYSICAL EXAM:  Gen: NAD, calm  HEENT: MMM  Neck: no JVD  Cards: RRR, +S1/S2, no M/G/R  Resp: course BS B/L, decreased BS @ bases B/L  GI: soft, NT/ND, NABS  : no CVA tenderness  Vascular: 1+ LE edema B/L  Derm: no rashes  Neuro: non-focal    LABS:      139  |  105  |  28<H>  ----------------------------<  156<H>  4.2   |  23  |  1.42<H>    Ca    8.6      2018 11:53    TPro  6.1  /  Alb  3.0<L>  /  TBili  0.4  /  DBili      /  AST  41<H>  /  ALT  26  /  AlkPhos  95      Creatinine Trend: 1.42 <--                        10.8   4.8   )-----------( 215      ( 2018 11:53 )             33.7     Urine Studies:  Urinalysis Basic - ( 2018 21:55 )    Color: Yellow / Appearance: Clear / S.010 / pH:   Gluc:  / Ketone: Negative  / Bili: Negative / Urobili: Negative mg/dL   Blood:  / Protein: Negative mg/dL / Nitrite: Negative   Leuk Esterase: Small / RBC: 14 /HPF / WBC 6 /HPF   Sq Epi:  / Non Sq Epi: 0 /HPF / Bacteria: Negative          RADIOLOGY & ADDITIONAL STUDIES:  < from: Xray Chest 1 View AP/PA (18 @ 11:40) >  IMPRESSION:   Bilateral pleural effusions with associatedatelectasis and/or pneumonia.    < end of copied text >

## 2018-04-15 NOTE — DIETITIAN INITIAL EVALUATION ADULT. - ENERGY NEEDS
ht: 68 inches, dosing wt: 164.9 pounds, BMI: 25.1 kg/m2, IBW: 154 pounds (+/- 10%), %IBW: 109%  Edema: 3+ b/l leg edema  Skin: no pressure injuries noted  Other pertinent information: Pt is 89 y/o male admitted for SOB/cough/fluid overload. Presents with CHF exacerbation, abdominal distension, HLD, viral respiratory infection, acute renal failure, and metastatic cancer. PMHx: HTN, MI, prostate cancer ht: 68 inches, dosing wt: 164.9 pounds, BMI: 25.1 kg/m2, IBW: 154 pounds (+/- 10%), %IBW: 109%  Skin: no pressure injuries noted  Other pertinent information: Pt is 89 y/o male admitted for SOB/cough/fluid overload. Presents with CHF exacerbation, abdominal distension, HLD, viral respiratory infection, acute renal failure, and metastatic cancer. PMHx: HTN, MI, prostate cancer

## 2018-04-16 ENCOUNTER — RX RENEWAL (OUTPATIENT)
Age: 83
End: 2018-04-16

## 2018-04-16 DIAGNOSIS — I50.33 ACUTE ON CHRONIC DIASTOLIC (CONGESTIVE) HEART FAILURE: ICD-10-CM

## 2018-04-16 DIAGNOSIS — E83.39 OTHER DISORDERS OF PHOSPHORUS METABOLISM: ICD-10-CM

## 2018-04-16 DIAGNOSIS — I48.92 UNSPECIFIED ATRIAL FLUTTER: ICD-10-CM

## 2018-04-16 DIAGNOSIS — I25.118 ATHEROSCLEROTIC HEART DISEASE OF NATIVE CORONARY ARTERY WITH OTHER FORMS OF ANGINA PECTORIS: ICD-10-CM

## 2018-04-16 DIAGNOSIS — J90 PLEURAL EFFUSION, NOT ELSEWHERE CLASSIFIED: ICD-10-CM

## 2018-04-16 DIAGNOSIS — I31.3 PERICARDIAL EFFUSION (NONINFLAMMATORY): ICD-10-CM

## 2018-04-16 DIAGNOSIS — J98.11 ATELECTASIS: ICD-10-CM

## 2018-04-16 LAB
ANION GAP SERPL CALC-SCNC: 11 MMOL/L — SIGNIFICANT CHANGE UP (ref 5–17)
BUN SERPL-MCNC: 25 MG/DL — HIGH (ref 7–23)
CALCIUM SERPL-MCNC: 8.4 MG/DL — SIGNIFICANT CHANGE UP (ref 8.4–10.5)
CHLORIDE SERPL-SCNC: 99 MMOL/L — SIGNIFICANT CHANGE UP (ref 96–108)
CO2 SERPL-SCNC: 28 MMOL/L — SIGNIFICANT CHANGE UP (ref 22–31)
CREAT SERPL-MCNC: 1.59 MG/DL — HIGH (ref 0.5–1.3)
GLUCOSE SERPL-MCNC: 104 MG/DL — HIGH (ref 70–99)
HCT VFR BLD CALC: 33.6 % — LOW (ref 39–50)
HGB BLD-MCNC: 10.5 G/DL — LOW (ref 13–17)
MCHC RBC-ENTMCNC: 29.5 PG — SIGNIFICANT CHANGE UP (ref 27–34)
MCHC RBC-ENTMCNC: 31.3 GM/DL — LOW (ref 32–36)
MCV RBC AUTO: 94.4 FL — SIGNIFICANT CHANGE UP (ref 80–100)
PLATELET # BLD AUTO: 184 K/UL — SIGNIFICANT CHANGE UP (ref 150–400)
POTASSIUM SERPL-MCNC: 4.1 MMOL/L — SIGNIFICANT CHANGE UP (ref 3.5–5.3)
POTASSIUM SERPL-SCNC: 4.1 MMOL/L — SIGNIFICANT CHANGE UP (ref 3.5–5.3)
RBC # BLD: 3.56 M/UL — LOW (ref 4.2–5.8)
RBC # FLD: 14.3 % — SIGNIFICANT CHANGE UP (ref 10.3–14.5)
SODIUM SERPL-SCNC: 138 MMOL/L — SIGNIFICANT CHANGE UP (ref 135–145)
WBC # BLD: 4.61 K/UL — SIGNIFICANT CHANGE UP (ref 3.8–10.5)
WBC # FLD AUTO: 4.61 K/UL — SIGNIFICANT CHANGE UP (ref 3.8–10.5)

## 2018-04-16 PROCEDURE — 99223 1ST HOSP IP/OBS HIGH 75: CPT

## 2018-04-16 RX ORDER — SODIUM,POTASSIUM PHOSPHATES 278-250MG
1 POWDER IN PACKET (EA) ORAL
Qty: 0 | Refills: 0 | Status: DISCONTINUED | OUTPATIENT
Start: 2018-04-16 | End: 2018-04-16

## 2018-04-16 RX ORDER — FUROSEMIDE 40 MG
40 TABLET ORAL DAILY
Qty: 0 | Refills: 0 | Status: DISCONTINUED | OUTPATIENT
Start: 2018-04-16 | End: 2018-04-16

## 2018-04-16 RX ORDER — TETRACAINE/BENZOCAINE/BUTAMBEN 2%-14%-2%
1 OINTMENT (GRAM) TOPICAL ONCE
Qty: 0 | Refills: 0 | Status: COMPLETED | OUTPATIENT
Start: 2018-04-16 | End: 2018-04-16

## 2018-04-16 RX ORDER — BENZOCAINE AND MENTHOL 5; 1 G/100ML; G/100ML
1 LIQUID ORAL ONCE
Qty: 0 | Refills: 0 | Status: DISCONTINUED | OUTPATIENT
Start: 2018-04-16 | End: 2018-04-16

## 2018-04-16 RX ORDER — VANCOMYCIN HCL 1 G
1000 VIAL (EA) INTRAVENOUS EVERY 24 HOURS
Qty: 0 | Refills: 0 | Status: DISCONTINUED | OUTPATIENT
Start: 2018-04-16 | End: 2018-04-19

## 2018-04-16 RX ADMIN — Medication 0.5 MILLIGRAM(S): at 06:14

## 2018-04-16 RX ADMIN — Medication 3 MILLILITER(S): at 11:30

## 2018-04-16 RX ADMIN — HEPARIN SODIUM 5000 UNIT(S): 5000 INJECTION INTRAVENOUS; SUBCUTANEOUS at 06:14

## 2018-04-16 RX ADMIN — Medication 3 MILLILITER(S): at 18:21

## 2018-04-16 RX ADMIN — RIVAROXABAN 15 MILLIGRAM(S): KIT at 18:21

## 2018-04-16 RX ADMIN — ATENOLOL 12.5 MILLIGRAM(S): 25 TABLET ORAL at 06:14

## 2018-04-16 RX ADMIN — Medication 3 MILLILITER(S): at 06:14

## 2018-04-16 RX ADMIN — TAMSULOSIN HYDROCHLORIDE 0.8 MILLIGRAM(S): 0.4 CAPSULE ORAL at 11:30

## 2018-04-16 RX ADMIN — Medication 0.25 MILLIGRAM(S): at 21:53

## 2018-04-16 RX ADMIN — Medication 40 MILLIGRAM(S): at 06:14

## 2018-04-16 RX ADMIN — Medication 250 MILLIGRAM(S): at 22:22

## 2018-04-16 RX ADMIN — Medication 100 MILLIGRAM(S): at 18:22

## 2018-04-16 RX ADMIN — Medication 1 SPRAY(S): at 22:22

## 2018-04-16 RX ADMIN — Medication 100 MILLIGRAM(S): at 06:14

## 2018-04-16 RX ADMIN — Medication 1 PACKET(S): at 11:30

## 2018-04-16 RX ADMIN — GABAPENTIN 300 MILLIGRAM(S): 400 CAPSULE ORAL at 06:14

## 2018-04-16 RX ADMIN — PANTOPRAZOLE SODIUM 40 MILLIGRAM(S): 20 TABLET, DELAYED RELEASE ORAL at 06:14

## 2018-04-16 RX ADMIN — GABAPENTIN 300 MILLIGRAM(S): 400 CAPSULE ORAL at 18:22

## 2018-04-16 RX ADMIN — HEPARIN SODIUM 5000 UNIT(S): 5000 INJECTION INTRAVENOUS; SUBCUTANEOUS at 18:21

## 2018-04-16 RX ADMIN — ATORVASTATIN CALCIUM 20 MILLIGRAM(S): 80 TABLET, FILM COATED ORAL at 21:53

## 2018-04-16 RX ADMIN — Medication 1 TABLET(S): at 11:30

## 2018-04-16 RX ADMIN — Medication 0.5 MILLIGRAM(S): at 18:21

## 2018-04-16 RX ADMIN — LISINOPRIL 2.5 MILLIGRAM(S): 2.5 TABLET ORAL at 06:14

## 2018-04-16 NOTE — CONSULT NOTE ADULT - PROBLEM SELECTOR PROBLEM 1
CKD (chronic kidney disease), stage III
Coronary artery disease of native artery of native heart with stable angina pectoris

## 2018-04-16 NOTE — CONSULT NOTE ADULT - PROBLEM SELECTOR PROBLEM 2
Hypertensive kidney disease with chronic kidney disease, stage 1 through stage 4 or unspecified 
Atrial flutter with controlled response

## 2018-04-16 NOTE — CONSULT NOTE ADULT - PROBLEM SELECTOR RECOMMENDATION 3
Improving with lasix 40 mg IV twice daily. Continue with diuretics. Monitor UO.
Secondary to influenza and resp distress. Diuresed. Small Pericardial effusion noted on CT. Check echo.

## 2018-04-16 NOTE — CONSULT NOTE ADULT - SUBJECTIVE AND OBJECTIVE BOX
Patient is a 89y old  Male who presents with a chief complaint of           PAST MEDICAL & SURGICAL HISTORY:  HLD (hyperlipidemia)  HTN (hypertension)  Cancer of prostate  MI, old:  &amp;       SOCIAL HISTORY  Alcohol:  Tobacco:  Illicit substance use:      FAMILY HISTORY: Non contributory to the present illness        ALLERGIES: PCN        T(C): 36.9 (18 @ 21:15), Max: 36.9 (18 @ 21:15)  HR: 70 (18 @ 21:15) (70 - 75)  BP: 101/57 (18 @ 21:15) (90/52 - 106/69)  RR: 18 (18 @ 21:15) (18 - 18)  SpO2: 95% (18 @ 21:15) (93% - 97%)  Wt(kg): --  I&O's Summary        PHYSICAL EXAM:  GENERAL: N  CVS:  RESP:  GI:  EXT:  CNS:          LABS:                        10.5   4.61  )-----------( 184      ( 2018 07:31 )             33.6     04-16    138  |  99  |  25<H>  ----------------------------<  104<H>  4.1   |  28  |  1.59<H>    Ca    8.4      2018 06:54  Phos  2.3     04-15  Mg     2.1     -15    TPro  6.3  /  Alb  3.1<L>  /  TBili  0.7  /  DBili  0.2  /  AST  30  /  ALT  21  /  AlkPhos  78  04-15      Urinalysis Basic - ( 2018 21:55 )    Color: Yellow / Appearance: Clear / S.010 / pH: x  Gluc: x / Ketone: Negative  / Bili: Negative / Urobili: Negative mg/dL   Blood: x / Protein: Negative mg/dL / Nitrite: Negative   Leuk Esterase: Small / RBC: 14 /HPF / WBC 6 /HPF   Sq Epi: x / Non Sq Epi: 0 /HPF / Bacteria: Negative            MEDICATIONS  (STANDING):  ALBUTerol/ipratropium for Nebulization 3 milliLiter(s) Nebulizer every 6 hours  ATENolol  Tablet 12.5 milliGRAM(s) Oral daily  atorvastatin 20 milliGRAM(s) Oral at bedtime  buDESOnide   0.5 milliGRAM(s) Respule 0.5 milliGRAM(s) Inhalation every 12 hours  cetylpyridinium 0.05% Oral Solution 20 milliLiter(s) Swish and Spit once  docusate sodium 100 milliGRAM(s) Oral two times a day  gabapentin 300 milliGRAM(s) Oral two times a day  heparin  Injectable 5000 Unit(s) SubCutaneous every 12 hours  multivitamin/minerals 1 Tablet(s) Oral daily  pantoprazole    Tablet 40 milliGRAM(s) Oral before breakfast  rivaroxaban 15 milliGRAM(s) Oral <User Schedule>  tamsulosin 0.8 milliGRAM(s) Oral daily  tetracaine/benzocaine/butamben Spray 1 Spray(s) Topical once    MEDICATIONS  (PRN):  acetaminophen   Tablet. 650 milliGRAM(s) Oral every 6 hours PRN Mild Pain (1 - 3)  triazolam 0.25 milliGRAM(s) Oral at bedtime PRN Insomnia      REVIEW OF SYSTEMS:  CONSTITUTIONAL: No fever, weight loss, or fatigue  EYES: No eye pain, visual disturbances, or discharge  ENMT:  No difficulty hearing, tinnitus, vertigo; No sinus or throat pain      RADIOLOGY & ADDITIONAL TESTS:    < from: CT Chest No Cont (04.15.18 @ 17:10) >  IMPRESSION: Moderate bilateral pleural effusions with adjacent   compressive atelectasis.    Linear atelectasis in the right middle lobe and lingula.    < end of copied text > Patient is a 89 yo Male with multiple co-morbidities including Prostate Cancer on lupron, who presents to the ER for evaluation of several days difficulty breathing, productive cough and increasing abdominal distention, and pedal edema.  In the ER, he found to have positive Parainfluenza 3. The ID consult requested after outpatient urine culture became positive with MRSA to assist with antibiotic management.             REVIEW OF SYSTEMS: Total of twelve systems have been reviewed with patient and found to be negative unless mentioned in HPI            PAST MEDICAL & SURGICAL HISTORY:  HLD (hyperlipidemia)  HTN (hypertension)  Cancer of prostate  MI, old:  &amp;           SOCIAL HISTORY  Alcohol: Does not drink  Tobacco: Does not smoke  Illicit substance use: None          FAMILY HISTORY: Non contributory to the present illness        ALLERGIES: PCN        T(C): 36.9 (18 @ 21:15), Max: 36.9 (18 @ 21:15)  HR: 70 (18 @ 21:15) (70 - 75)  BP: 101/57 (18 @ 21:15) (90/52 - 106/69)  RR: 18 (18 @ 21:15) (18 - 18)  SpO2: 95% (18 @ 21:15) (93% - 97%)  Wt(kg): --  I&O's Summary        PHYSICAL EXAM:  GENERAL: Not in distress, on VM  CVS: s1 and s2 present  RESP: Air entry B/L  GI: Abdomen soft and nontender  EXT: No pedal edema  CNS: Awake, alert and oriented          LABS:                        10.5   4.61  )-----------( 184      ( 2018 07:31 )             33.6     -16    138  |  99  |  25<H>  ----------------------------<  104<H>  4.1   |  28  |  1.59<H>    Ca    8.4      2018 06:54  Phos  2.3     -15  Mg     2.1     -15    TPro  6.3  /  Alb  3.1<L>  /  TBili  0.7  /  DBili  0.2  /  AST  30  /  ALT  21  /  AlkPhos  78  -15      Urinalysis Basic - ( 2018 21:55 )    Color: Yellow / Appearance: Clear / S.010 / pH: x  Gluc: x / Ketone: Negative  / Bili: Negative / Urobili: Negative mg/dL   Blood: x / Protein: Negative mg/dL / Nitrite: Negative   Leuk Esterase: Small / RBC: 14 /HPF / WBC 6 /HPF   Sq Epi: x / Non Sq Epi: 0 /HPF / Bacteria: Negative            MEDICATIONS  (STANDING):  ALBUTerol/ipratropium for Nebulization 3 milliLiter(s) Nebulizer every 6 hours  ATENolol  Tablet 12.5 milliGRAM(s) Oral daily  atorvastatin 20 milliGRAM(s) Oral at bedtime  buDESOnide   0.5 milliGRAM(s) Respule 0.5 milliGRAM(s) Inhalation every 12 hours  cetylpyridinium 0.05% Oral Solution 20 milliLiter(s) Swish and Spit once  docusate sodium 100 milliGRAM(s) Oral two times a day  gabapentin 300 milliGRAM(s) Oral two times a day  heparin  Injectable 5000 Unit(s) SubCutaneous every 12 hours  multivitamin/minerals 1 Tablet(s) Oral daily  pantoprazole    Tablet 40 milliGRAM(s) Oral before breakfast  rivaroxaban 15 milliGRAM(s) Oral <User Schedule>  tamsulosin 0.8 milliGRAM(s) Oral daily  tetracaine/benzocaine/butamben Spray 1 Spray(s) Topical once    MEDICATIONS  (PRN):  acetaminophen   Tablet. 650 milliGRAM(s) Oral every 6 hours PRN Mild Pain (1 - 3)  triazolam 0.25 milliGRAM(s) Oral at bedtime PRN Insomnia          RADIOLOGY & ADDITIONAL TESTS:    4/15/18: CT Chest No Cont (04.15.18 @ 17:10) IMPRESSION: Moderate bilateral pleural effusions with adjacent compressive atelectasis. Linear atelectasis in the right middle lobe and lingula.      18: Xray Chest 1 View- PORTABLE-Routine (18 @ 23:22) Small pleural effusions with adjacent likely atelectasis is unchanged.        MICROBIOLOGY DATA:    Urine Microscopic-Add On (NC) (18 @ 21:55)    Red Blood Cell - Urine: 14 /HPF    White Blood Cell - Urine: 6 /HPF    Bacteria: Negative    Epithelial Cells: 0 /HPF    Rapid Respiratory Viral Panel (04.14.18 @ 11:53)    Rapid RVP Result: Detected: The [employer] RVP Rapid uses polymerase chain reaction (PCR) and melt  curve analysis to screen for adenovirus; coronavirus HKU1, NL63, 229E,  OC43; human metapneumovirus (hMPV); human enterovirus/rhinovirus  (Entero/RV); influenza A; influenza A/H1;influenza A/H3; influenza  A/H1-2009; influenza B; parainfluenza viruses 1, 2, 3, 4; respiratory  syncytial virus; Bordetella pertussis; Mycoplasma pneumoniae; and  Chlamydophila pneumoniae.

## 2018-04-16 NOTE — CONSULT NOTE ADULT - PROBLEM SELECTOR RECOMMENDATION 2
BP controlled. Would discontinue IV ACE-I until baseline Scr determined and patient euvolemic. Monitor BP.
Had sinus shazia and had to d/c beta-blocker but once developed AF, HR increased and now good HR with beta-blocker. Decide not to attempt cardioversion.

## 2018-04-16 NOTE — PROGRESS NOTE ADULT - SUBJECTIVE AND OBJECTIVE BOX
Patient seen and examined at bedside  No new acute events noted overnight  Case discussed with medical team    HPI:  89 yo M from home with PMhx HTN, HLD, MI X 2, CHF of unspecified EF, Prostate Cx (?w/ mets), who p/w several days difficulty breathing, sob, productive cough of white sputum, increasing abdominal distention, and pedal edema.  Pt recently stopped taking diuretics 2/2 adverse effects.  In ED, pt w/ s/s fluid overload, with increasing work of breathing, s/p lasix, vasotec, and put on BiPAP. (2018 14:54)      PAST MEDICAL & SURGICAL HISTORY:  HLD (hyperlipidemia)  HTN (hypertension)  Cancer of prostate  MI, old:  &amp;       penicillin (Unknown)       MEDICATIONS  (STANDING):  ALBUTerol/ipratropium for Nebulization 3 milliLiter(s) Nebulizer every 6 hours  ATENolol  Tablet 12.5 milliGRAM(s) Oral daily  atorvastatin 20 milliGRAM(s) Oral at bedtime  buDESOnide   0.5 milliGRAM(s) Respule 0.5 milliGRAM(s) Inhalation every 12 hours  docusate sodium 100 milliGRAM(s) Oral two times a day  furosemide   Injectable 40 milliGRAM(s) IV Push daily  gabapentin 300 milliGRAM(s) Oral two times a day  heparin  Injectable 5000 Unit(s) SubCutaneous every 12 hours  multivitamin/minerals 1 Tablet(s) Oral daily  pantoprazole    Tablet 40 milliGRAM(s) Oral before breakfast  potassium phosphate / sodium phosphate powder 1 Packet(s) Oral four times a day with meals  rivaroxaban 15 milliGRAM(s) Oral <User Schedule>  tamsulosin 0.8 milliGRAM(s) Oral daily    MEDICATIONS  (PRN):  acetaminophen   Tablet. 650 milliGRAM(s) Oral every 6 hours PRN Mild Pain (1 - 3)  triazolam 0.25 milliGRAM(s) Oral at bedtime PRN Insomnia      REVIEW OF SYSTEMS:  CONSTITUTIONAL: (+) malaise.   EYES: No acute change in vision   ENT:  No tinnitus  NECK: No stiffness  RESPIRATORY: cough, sob on exertion. No hemoptysis  CARDIOVASCULAR: No chest pain, palpitations, syncope  GASTROINTESTINAL: No hematemesis, diarrhea, melena, or hematochezia.  GENITOURINARY: No hematuria  NEUROLOGICAL: No headaches  LYMPH Nodes: No enlarged glands  ENDOCRINE: No heat or cold intolerance	    T(C): 36.3 (18 @ 06:40), Max: 37.4 (04-15-18 @ 20:59)  HR: 70 (18 @ 10:13) (70 - 88)  BP: 106/69 (18 @ 06:40) (92/54 - 124/68)  RR: 18 (18 @ 06:40) (18 - 18)  SpO2: 96% (18 @ 10:13) (94% - 97%)    PHYSICAL EXAMINATION:   Constitutional: WD, NAD  HEENT: NC, AT  Neck:  Supple  Respiratory: b/l inspiratory rales stable, no wheezing. Adequate airflow b/l. Not using accessory muscles of respiration.  Cardiovascular:  S1 & S2 intact, systolic murmur, no R/G, 2+ radial pulses b/l  Gastrointestinal: Soft, NT, ND, normoactive b.s., no organomegaly/RT/rigidity  Extremities: WWP  Neurological:  Alert and awake.  No acute focal motor deficits. Crude sensation intact.     Labs and imaging reviewed    LABS:                        10.5   4.61  )-----------( 184      ( 2018 07:31 )             33.6     04-16    138  |  99  |  25<H>  ----------------------------<  104<H>  4.1   |  28  |  1.59<H>    Ca    8.4      2018 06:54  Phos  2.3     -15  Mg     2.1     -15    TPro  6.3  /  Alb  3.1<L>  /  TBili  0.7  /  DBili  0.2  /  AST  30  /  ALT  21  /  AlkPhos  78  04-15    CARDIAC MARKERS ( 15 Apr 2018 14:52 )  x     / <0.01 ng/mL / 365 U/L / x     / 3.3 ng/mL  CARDIAC MARKERS ( 15 Apr 2018 01:47 )  x     / 0.02 ng/mL / 162 U/L / x     / 2.9 ng/mL  CARDIAC MARKERS ( 2018 19:25 )  x     / 0.03 ng/mL / 143 U/L / x     / 2.5 ng/mL  CARDIAC MARKERS ( 2018 11:53 )  x     / <0.01 ng/mL / x     / x     / x            Urinalysis Basic - ( 2018 21:55 )    Color: Yellow / Appearance: Clear / S.010 / pH: x  Gluc: x / Ketone: Negative  / Bili: Negative / Urobili: Negative mg/dL   Blood: x / Protein: Negative mg/dL / Nitrite: Negative   Leuk Esterase: Small / RBC: 14 /HPF / WBC 6 /HPF   Sq Epi: x / Non Sq Epi: 0 /HPF / Bacteria: Negative      CAPILLARY BLOOD GLUCOSE            LIVER FUNCTIONS - ( 15 Apr 2018 17:42 )  Alb: x     / Pro: x     / ALK PHOS: x     / ALT: x     / AST: x     / GGT: 19 U/L           RADIOLOGY & ADDITIONAL STUDIES:

## 2018-04-16 NOTE — PROGRESS NOTE ADULT - ASSESSMENT
89 year old male with history of prostate CA presents with SOB due to parainfluenza and volume overload. Nephrology consulted for elevated Scr.

## 2018-04-16 NOTE — PROGRESS NOTE ADULT - SUBJECTIVE AND OBJECTIVE BOX
Follow-up Pulm Progress Note    The patient was seen and examined. Notes reviewed and discussed with staff/team as applicable      No new respiratory events overnight. Lying flat.      Denies: increased SOB, Chest pain, increased cough, colored phlegm, hemoptysis, N/V/D, neck stiffness, dysuria      Vital Signs Last 24 Hrs  T(C): 36.3 (2018 06:40), Max: 37.4 (15 Apr 2018 20:59)  T(F): 97.4 (2018 06:40), Max: 99.3 (15 Apr 2018 20:59)  HR: 71 (2018 06:40) (71 - 88)  BP: 106/69 (2018 06:40) (92/54 - 124/68)  BP(mean): --  RR: 18 (2018 06:40) (18 - 18)  SpO2: 97% (2018 06:40) (94% - 97%)          04-15 @ 07:01  -  04-16 @ 07:00  --------------------------------------------------------  IN: 1080 mL / OUT: 900 mL / NET: 180 mL          Medications:  MEDICATIONS  (STANDING):  ALBUTerol/ipratropium for Nebulization 3 milliLiter(s) Nebulizer every 6 hours  ATENolol  Tablet 12.5 milliGRAM(s) Oral daily  atorvastatin 20 milliGRAM(s) Oral at bedtime  buDESOnide   0.5 milliGRAM(s) Respule 0.5 milliGRAM(s) Inhalation every 12 hours  docusate sodium 100 milliGRAM(s) Oral two times a day  furosemide   Injectable 40 milliGRAM(s) IV Push every 12 hours  gabapentin 300 milliGRAM(s) Oral two times a day  heparin  Injectable 5000 Unit(s) SubCutaneous every 12 hours  lisinopril 2.5 milliGRAM(s) Oral daily  multivitamin/minerals 1 Tablet(s) Oral daily  pantoprazole    Tablet 40 milliGRAM(s) Oral before breakfast  rivaroxaban 15 milliGRAM(s) Oral <User Schedule>  tamsulosin 0.8 milliGRAM(s) Oral daily    MEDICATIONS  (PRN):  acetaminophen   Tablet. 650 milliGRAM(s) Oral every 6 hours PRN Mild Pain (1 - 3)  triazolam 0.25 milliGRAM(s) Oral at bedtime PRN Insomnia      Allergies    penicillin (Unknown)    Physical Examination:    Pleasant elderly white man, in bed, no distress  Neck: no JVD, LAD, accessory muscle use  PULM: Decreased BS bases bilaterally, no wheezes  CVS: Regular rate and rhythm, S1S2, no murmurs, rubs, or gallops  Abdomen: soft, NT  Extremities: +edema  Neuro: non focal      LABS:                        10.5   4.61  )-----------( 184      ( 2018 07:31 )             33.6     04-16    138  |  99  |  25<H>  ----------------------------<  104<H>  4.1   |  28  |  1.59<H>    Ca    8.4      2018 06:54  Phos  2.3     -15  Mg     2.1     -15    TPro  6.3  /  Alb  3.1<L>  /  TBili  0.7  /  DBili  0.2  /  AST  30  /  ALT  21  /  AlkPhos  78  04-15      CARDIAC MARKERS ( 15 Apr 2018 14:52 )  x     / <0.01 ng/mL / 365 U/L / x     / 3.3 ng/mL  CARDIAC MARKERS ( 15 Apr 2018 01:47 )  x     / 0.02 ng/mL / 162 U/L / x     / 2.9 ng/mL  CARDIAC MARKERS ( 2018 19:25 )  x     / 0.03 ng/mL / 143 U/L / x     / 2.5 ng/mL  CARDIAC MARKERS ( 2018 11:53 )  x     / <0.01 ng/mL / x     / x     / x          Urinalysis Basic - ( 2018 21:55 )    Color: Yellow / Appearance: Clear / S.010 / pH: x  Gluc: x / Ketone: Negative  / Bili: Negative / Urobili: Negative mg/dL   Blood: x / Protein: Negative mg/dL / Nitrite: Negative   Leuk Esterase: Small / RBC: 14 /HPF / WBC 6 /HPF   Sq Epi: x / Non Sq Epi: 0 /HPF / Bacteria: Negative        Serum Pro-Brain Natriuretic Peptide: 1229 pg/mL (18 @ 11:53) Follow-up Pulm Progress Note    The patient was seen and examined. Notes reviewed and discussed with staff/team as applicable      No new respiratory events overnight. Lying flat.      Denies: increased SOB, Chest pain, increased cough, colored phlegm, hemoptysis, N/V/D, neck stiffness, dysuria      Vital Signs Last 24 Hrs  T(C): 36.3 (2018 06:40), Max: 37.4 (15 Apr 2018 20:59)  T(F): 97.4 (2018 06:40), Max: 99.3 (15 Apr 2018 20:59)  HR: 71 (2018 06:40) (71 - 88)  BP: 106/69 (2018 06:40) (92/54 - 124/68)  BP(mean): --  RR: 18 (2018 06:40) (18 - 18)  SpO2: 97% (2018 06:40) (94% - 97%)          04-15 @ 07:01  -  04-16 @ 07:00  --------------------------------------------------------  IN: 1080 mL / OUT: 900 mL / NET: 180 mL          Medications:  MEDICATIONS  (STANDING):  ALBUTerol/ipratropium for Nebulization 3 milliLiter(s) Nebulizer every 6 hours  ATENolol  Tablet 12.5 milliGRAM(s) Oral daily  atorvastatin 20 milliGRAM(s) Oral at bedtime  buDESOnide   0.5 milliGRAM(s) Respule 0.5 milliGRAM(s) Inhalation every 12 hours  docusate sodium 100 milliGRAM(s) Oral two times a day  furosemide   Injectable 40 milliGRAM(s) IV Push every 12 hours  gabapentin 300 milliGRAM(s) Oral two times a day  heparin  Injectable 5000 Unit(s) SubCutaneous every 12 hours  lisinopril 2.5 milliGRAM(s) Oral daily  multivitamin/minerals 1 Tablet(s) Oral daily  pantoprazole    Tablet 40 milliGRAM(s) Oral before breakfast  rivaroxaban 15 milliGRAM(s) Oral <User Schedule>  tamsulosin 0.8 milliGRAM(s) Oral daily    MEDICATIONS  (PRN):  acetaminophen   Tablet. 650 milliGRAM(s) Oral every 6 hours PRN Mild Pain (1 - 3)  triazolam 0.25 milliGRAM(s) Oral at bedtime PRN Insomnia      Allergies    penicillin (Unknown)    Physical Examination:    Pleasant elderly white man, in bed, no distress  Neck: no JVD, LAD, accessory muscle use  PULM: Decreased BS bases bilaterally, +wheeze bilaterally  CVS: Irregular rate and rhythm, S1S2, no murmurs, rubs, or gallops  Abdomen: soft, NT  Extremities: +edema  Neuro: non focal      LABS:                        10.5   4.61  )-----------( 184      ( 2018 07:31 )             33.6     04-16    138  |  99  |  25<H>  ----------------------------<  104<H>  4.1   |  28  |  1.59<H>    Ca    8.4      2018 06:54  Phos  2.3     -15  Mg     2.1     -15    TPro  6.3  /  Alb  3.1<L>  /  TBili  0.7  /  DBili  0.2  /  AST  30  /  ALT  21  /  AlkPhos  78  04-15      CARDIAC MARKERS ( 15 Apr 2018 14:52 )  x     / <0.01 ng/mL / 365 U/L / x     / 3.3 ng/mL  CARDIAC MARKERS ( 15 Apr 2018 01:47 )  x     / 0.02 ng/mL / 162 U/L / x     / 2.9 ng/mL  CARDIAC MARKERS ( 2018 19:25 )  x     / 0.03 ng/mL / 143 U/L / x     / 2.5 ng/mL  CARDIAC MARKERS ( 2018 11:53 )  x     / <0.01 ng/mL / x     / x     / x          Urinalysis Basic - ( 2018 21:55 )    Color: Yellow / Appearance: Clear / S.010 / pH: x  Gluc: x / Ketone: Negative  / Bili: Negative / Urobili: Negative mg/dL   Blood: x / Protein: Negative mg/dL / Nitrite: Negative   Leuk Esterase: Small / RBC: 14 /HPF / WBC 6 /HPF   Sq Epi: x / Non Sq Epi: 0 /HPF / Bacteria: Negative        Serum Pro-Brain Natriuretic Peptide: 1229 pg/mL (18 @ 11:53)    < from: CT Chest No Cont (04.15.18 @ 17:10) >  ******PRELIMINARY REPORT******    ******PRELIMINARY REPORT******          EXAM:  CT CHEST                            PROCEDURE DATE:  04/15/2018      ******PRELIMINARY REPORT******    ******PRELIMINARY REPORT******              INTERPRETATION:  Moderate bilateral pleural effusions with adjacent   atelectasis.    Scattered indeterminate subcentimeter solid nodules for example of the   right lower lobe on series 3 image 100 and left lower lobe adjacent to   the fissure series 3 image 70.    Tracemildly complex pericardial effusion, increased since 2017.   Borderline dilatation of the midascending aorta to 4.0cm.  Aortic valve   calcification.  Correlate with echocardiography as clinically warrented.              ******PRELIMINARY REPORT******    ******PRELIMINARY REPORT******          FARHAN SAUCEDA M.D., RADIOLOGY RESIDENT          < end of copied text >

## 2018-04-16 NOTE — CONSULT NOTE ADULT - ASSESSMENT
89 year old male with history of prostate CA presents with SOB due to parainfluenza and volume overload. Nephrology consulted for elevated Scr.
Elderly with remote MI, stable CAD, no hx CHF, mild-mod AI and MR. Prostate CA rx with Lupron. Recent edema, atrial flutter. On anticoag with Xarelto 15 and rate control with beta-blocker. Issues with bradycardia prior. Now with influenza and bronchospasm, some CHF. Edema better with IV lasix but increasing BUN and Creatinine and no more edema.
Parainfluenza infection complicated by CHF and bronchial hyperreactivity.  The patient had been hypoxic requiring bipap, but he has improved with diuresis and nebulizer rx, currently, he is tolerating NC O2.    Supplemental O2 to maintain saturation 92% or above watching for evidence of CO2 retention.  Cautious diuresis and CV management.  Continue neb albuterol if tolerated.   Add budesonide 0.5 mg via neb bid.  Elevating HOB.   Incentive spirometry when feasible.  Low threshold for a few work up and initiation of antibiotic therapy.  Following CxR.  Standard GI/DVT prophylaxis.    Discussed with the patient and his daughter at the bedside.    Thank you for this consult.  Will follow.
Patient is a 89 yo Male with multiple co-morbidities including Prostate Cancer on lupron, who presents to the ER for evaluation of several days difficulty breathing, productive cough and increasing abdominal distention, and pedal edema.  In the ER, he found to have positive Parainfluenza 3. The ID consult requested after outpatient urine culture became positive with MRSA to assist with antibiotic management.       # Parainfluenza 3  # UTI - MRSA    Would recommend:  1. Add Vancomycin to cover MRSA inpatient and May change to oral on discharge   2. Supportive care for parainfluenza 3  3. Continue Supplemental oxygenation and bronchodilator as needed  4. Aspiration precaution    d/w patient and his daughter at the bed side    will follow the patient with you and make further recommendation based on the clinical course and Lab results  Thank you for the opportunity to participate in Mr. MARQUEZ's care

## 2018-04-16 NOTE — PROGRESS NOTE ADULT - ASSESSMENT
Parainfluenza infection complicated by CHF and bronchial hyperreactivity.  The patient had been hypoxic requiring bipap, but he has improved with diuresis and nebulizer rx, currently, he is tolerating NC O2.    Supplemental O2 to maintain saturation 92% or above watching for evidence of CO2 retention.  Cautious diuresis and CV management per cardiology and nephrology  Continue duoneb Q6  Budesonide 0.5 mg via neb bid.  Elevating HOB.   Following CxR- improved  Standard GI/DVT prophylaxis.      Fausto Espinoza MD  Pulmonary Medicine  [phone] Parainfluenza infection complicated by CHF and bronchial hyperreactivity.  The patient had been hypoxic requiring bipap, but he has improved with diuresis and nebulizer rx, currently, he is tolerating NC O2.    Supplemental O2 to maintain saturation 92% or above watching for evidence of CO2 retention.  Cautious diuresis and CV management per cardiology and nephrology  Continue duoneb Q6  Budesonide 0.5 mg via neb bid.  Elevating HOB.   Following CxR- improved  will review CT official.   Standard GI/DVT prophylaxis.      Fausto Espinoza MD  Pulmonary Medicine  [phone]

## 2018-04-16 NOTE — CONSULT NOTE ADULT - PROBLEM SELECTOR RECOMMENDATION 9
Suspect patient with underlying CKD-3 without proteinuria as patient was told of kidney disease by outpatient physician. Baseline Scr unknown however will attempt to contact Dr. Salvador to determine baseline renal function. Prior CT A/P reviewed. Avoid nephrotoxins. Monitor electrolytes.
Stable. On beta-blocker, statin, now anticoag so plavix stopped

## 2018-04-16 NOTE — CONSULT NOTE ADULT - PROBLEM SELECTOR RECOMMENDATION 4
Patient with microscopic hematuria however doubt secondary to glomerular etiology also with h/o nephrolithiasis and prostate CA. Outpatient urology follow up.
Per medicine and pulmonary. Secondary to influenza.

## 2018-04-16 NOTE — CONSULT NOTE ADULT - SUBJECTIVE AND OBJECTIVE BOX
Patient seen and evaluated @   Chief Complaint: Patient is a 89y old  Male who presents with a chief complaint of     HPI:  89 yo M from home with PMhx HTN, HLD, MI X 2, CHF of unspecified EF, Prostate Cx (?w/ mets), who p/w several days difficulty breathing, sob, productive cough of white sputum, increasing abdominal distention, and pedal edema.  Pt recently stopped taking diuretics 2/2 adverse effects.  In ED, pt w/ s/s fluid overload, with increasing work of breathing, s/p lasix, vasotec, and put on BiPAP. (2018 14:54)    PMH:   HLD (hyperlipidemia)  HTN (hypertension)  Cancer of prostate  MI, old    PSH:     Medications:   acetaminophen   Tablet. 650 milliGRAM(s) Oral every 6 hours PRN  ALBUTerol/ipratropium for Nebulization 3 milliLiter(s) Nebulizer every 6 hours  ATENolol  Tablet 12.5 milliGRAM(s) Oral daily  atorvastatin 20 milliGRAM(s) Oral at bedtime  buDESOnide   0.5 milliGRAM(s) Respule 0.5 milliGRAM(s) Inhalation every 12 hours  docusate sodium 100 milliGRAM(s) Oral two times a day  furosemide   Injectable 40 milliGRAM(s) IV Push every 12 hours  gabapentin 300 milliGRAM(s) Oral two times a day  heparin  Injectable 5000 Unit(s) SubCutaneous every 12 hours  lisinopril 2.5 milliGRAM(s) Oral daily  multivitamin/minerals 1 Tablet(s) Oral daily  pantoprazole    Tablet 40 milliGRAM(s) Oral before breakfast  rivaroxaban 15 milliGRAM(s) Oral <User Schedule>  tamsulosin 0.8 milliGRAM(s) Oral daily  triazolam 0.25 milliGRAM(s) Oral at bedtime PRN    Allergies:  penicillin (Unknown)    FAMILY HISTORY:    Social History:  Smoking:  Alcohol:  Drugs:    Review of Systems:  Constitutional: no recent weight loss  HEENT: no scleral icterus. Normal mucosa.  Respiratory: no shortness of breath. No history of asthma. No COPD  Cardiovascular: No Chest Pain, no Palpitations, no ARRIOLA, PND, or Orthopnea. no Syncope. No history of rheumatic fever.   Gastrointestinal: No Abdominal Pain, no Diarrhea, no Constipation, no Nausea or Vomiting  Genitourinary: No Nocturia,Dysuria, or Incontinence. No hematuria  Extremities: no edema. No cyanosis.  Neurologic: No Focal deficit. No Paresthesias. No Syncope. No seizures  Lymphatic: No Swelling. No Lymphadenopathy   Skin: No Rash,  Ecchymoses, Wounds, or Lesions  Psychiatry: No Depression. No Anxiety.    10 point review of systems is otherwise negative except as mentioned above            [ ]Unable to obtain    Physical Exam:  T(C): 36.3 (18 @ 06:40), Max: 37.4 (04-15-18 @ 20:59)  HR: 71 (18 @ 06:40) (71 - 88)  BP: 106/69 (18 @ 06:40) (92/54 - 124/68)  RR: 18 (18 @ 06:40) (18 - 18)  SpO2: 97% (18 @ 06:40) (94% - 97%)  Wt(kg): --    04-15 @ 07:01  -   @ 07:00  --------------------------------------------------------  IN: 1080 mL / OUT: 900 mL / NET: 180 mL      Daily     Daily Weight in k.8 (2018 07:31)    Appearance: WD, WN, NAD  Eyes:  No scleral icterus. PERRL, EOMI  HENT: Normal oral mucosa.]NC/AT  Neck: No JVD at 90 degrees. Normal carotid upstrokes without bruits or murmurs.  Cardiovascular: Normal PMI. Normal S1, S2 physiologically split. No S3 or S4. No murmurs.  Respiratory: Clear to auscultation bilaterally. No wheezes. No rales.  Gastrointestinal: Soft.  Non-tender. No hepatosplenomegally.  Extremities: No clubbing. No edema. Pulses 2+ bilaterally symmetric including pedal.  Musculoskeletal:  No joint deformity   Neurologic: Non-focal  Lymphatic:  No lymphadenopathy  Psychiatry: [+ ] AAOx3 [ +] Mood & affect appropriate  Skin: No rashes. No ecchymoses. No cyanosis    Cardiovascular Diagnostic Testing:  ECG:    Echo:    Stress Testing:    Cath:    Interpretation of Telemetry:    Imaging:    Labs:                        10.5   4.61  )-----------( 184      ( 2018 07:31 )             33.6     -16    138  |  99  |  25<H>  ----------------------------<  104<H>  4.1   |  28  |  1.59<H>    Ca    8.4      2018 06:54  Phos  2.3     -15  Mg     2.1     04-15    TPro  6.3  /  Alb  3.1<L>  /  TBili  0.7  /  DBili  0.2  /  AST  30  /  ALT  21  /  AlkPhos  78  04-15      CARDIAC MARKERS ( 15 Apr 2018 14:52 )  x     / <0.01 ng/mL / 365 U/L / x     / 3.3 ng/mL  CARDIAC MARKERS ( 15 Apr 2018 01:47 )  x     / 0.02 ng/mL / 162 U/L / x     / 2.9 ng/mL  CARDIAC MARKERS ( 2018 19:25 )  x     / 0.03 ng/mL / 143 U/L / x     / 2.5 ng/mL  CARDIAC MARKERS ( 2018 11:53 )  x     / <0.01 ng/mL / x     / x     / x          Serum Pro-Brain Natriuretic Peptide: 1229 pg/mL ( @ 11:53)    Total Cholesterol: 109  LDL: 42  HDL: 41  T    Hemoglobin A1C, Whole Blood: 6.0 % (04-15 @ 17:42)    Thyroid Stimulating Hormone, Serum: 0.58 uIU/mL (04-15 @ 17:42) Patient seen and evaluated @ 845  Chief Complaint: Patient is a 89y old  Male who presents with a chief complaint of SOB, found to have influenza    HPI:  89 yo M from home with PMhx HTN, HLD, MI X 2, CHF of unspecified EF, Prostate Cx (?w/ mets), who p/w several days difficulty breathing, sob, productive cough of white sputum, increasing abdominal distention, and pedal edema.  Pt recently stopped taking diuretics 2/2 adverse effects.  In ED, pt w/ s/s fluid overload, with increasing work of breathing, s/p lasix, vasotec, and put on BiPAP. (2018 14:54)    Recently diagnosed and treated for prostate CA. Severe edema from lupron. Developed atrial flutter 2018. Treated with Aldactazide. Elevated BUN/Cr and K+ 5.7 so Losartan stopped and a;dactazide changed to MWF but daughter D/C'ed totally around 3-4 weeks ago.  Developed influenza with wheezing abd SOB so to ER on Sat .    PMH:   HLD (hyperlipidemia)  HTN (hypertension)  Cancer of prostate  MI, old    PSH:     Medications:   acetaminophen   Tablet. 650 milliGRAM(s) Oral every 6 hours PRN  ALBUTerol/ipratropium for Nebulization 3 milliLiter(s) Nebulizer every 6 hours  ATENolol  Tablet 12.5 milliGRAM(s) Oral daily  atorvastatin 20 milliGRAM(s) Oral at bedtime  buDESOnide   0.5 milliGRAM(s) Respule 0.5 milliGRAM(s) Inhalation every 12 hours  docusate sodium 100 milliGRAM(s) Oral two times a day  furosemide   Injectable 40 milliGRAM(s) IV Push every 12 hours  gabapentin 300 milliGRAM(s) Oral two times a day  heparin  Injectable 5000 Unit(s) SubCutaneous every 12 hours  lisinopril 2.5 milliGRAM(s) Oral daily  multivitamin/minerals 1 Tablet(s) Oral daily  pantoprazole    Tablet 40 milliGRAM(s) Oral before breakfast  rivaroxaban 15 milliGRAM(s) Oral <User Schedule>  tamsulosin 0.8 milliGRAM(s) Oral daily  triazolam 0.25 milliGRAM(s) Oral at bedtime PRN    Allergies:  penicillin   ACE inhibitors    FAMILY HISTORY:    Social History: .   Smoking: Remote  Alcohol: No	  Drugs: No    Review of Systems:  Constitutional: no recent weight loss  HEENT: no scleral icterus. Normal mucosa.  Respiratory: no shortness of breath. No history of asthma. No COPD  Cardiovascular: No Chest Pain, no Palpitations, no ARRIOLA, PND, or Orthopnea. no Syncope. No history of rheumatic fever.   Gastrointestinal: No Abdominal Pain, no Diarrhea, no Constipation, no Nausea or Vomiting  Genitourinary: Recent UTI, urinary difficulty, recent dx prostate CA  Extremities: 2+ edema. No cyanosis.  Neurologic: No Focal deficit. No Paresthesias. No Syncope. No seizures  Lymphatic: No Swelling. No Lymphadenopathy   Skin: No Rash,  Ecchymoses, Wounds, or Lesions  Psychiatry: No Depression. No Anxiety.    10 point review of systems is otherwise negative except as mentioned above            [ ]Unable to obtain    Physical Exam:  T(C): 36.3 (18 @ 06:40), Max: 37.4 (04-15-18 @ 20:59)  HR: 71 (18 @ 06:40) (71 - 88)  BP: 106/69 (18 @ 06:40) (92/54 - 124/68)  RR: 18 (18 @ 06:40) (18 - 18)  SpO2: 97% (18 @ 06:40) (94% - 97%)  Wt(kg): --    04-15 @ 07:01  -   @ 07:00  --------------------------------------------------------  IN: 1080 mL / OUT: 900 mL / NET: 180 mL      Daily     Daily Weight in k.8 (2018 07:31)    Appearance: WD, WN, NAD  Eyes:  No scleral icterus. PERRL, EOMI  HENT: Normal oral mucosa.]NC/AT  Neck: No JVD at 90 degrees. (+) F waves Normal carotid upstrokes without bruits or murmurs.  Cardiovascular: Normal PMI. Normal S1, S2 physiologically split. No S3 or S4. No murmurs.  Respiratory: No ronchi No wheezes. No rales today.  Gastrointestinal: Soft.  Non-tender. No hepatosplenomegally.  Extremities: No clubbing. No edema today. Pulses 2+ bilaterally symmetric, ? pedal.  Musculoskeletal:  No joint deformity   Neurologic: Non-focal  Lymphatic:  No lymphadenopathy  Psychiatry: [+ ] AAOx3 [ +] Mood & affect appropriate  Skin: No rashes. No ecchymoses. No cyanosis    Cardiovascular Diagnostic Testing:  ECG:   < from: 12 Lead ECG (18 @ 11:19) >  Ventricular Rate 95 BPM    Atrial Rate 120 BPM    QRS Duration 122 ms     ms    QTc 469 ms    R Axis 52 degrees    T Axis -96 degrees    Diagnosis Line WIDE QRS RHYTHM  RIGHT BUNDLE BRANCH BLOCK  T WAVE ABNORMALITY, CONSIDER INFEROLATERAL ISCHEMIA  ABNORMAL ECG    < end of copied text >    Echo: 3/1/2016 Mild-mod MR. Mild-mod AI. Akinetic inferior and inferoseptum. LVEF 55%. Normal RV size and function. No pericardial effusion.    Stress Testin. Fixed inferior defect. No ischemia. LVEF 62%.    Cath: . 30% LAD. Totally occluded RCA. filling via collaterals.    Interpretation of Telemetry: Atrial flutter with MVR.    Imaging: < from: CT Chest No Cont (04.15.18 @ 17:10) >  INTERPRETATION:  Moderate bilateral pleural effusions with adjacent   atelectasis.    Scattered indeterminate subcentimeter solid nodules for example of the   right lower lobe on series 3 image 100 and left lower lobe adjacent to   the fissure series 3 image 70.    Tracemildly complex pericardial effusion, increased since 2017.   Borderline dilatation of the midascending aorta to 4.0cm.  Aortic valve   calcification.  Correlate with echocardiography as clinically warrented.      < end of copied text >      Labs:                        10.5   4.61  )-----------( 184      ( 2018 07:31 )             33.6     04-16    138  |  99  |  25<H>  ----------------------------<  104<H>  4.1   |  28  |  1.59<H>    Ca    8.4      2018 06:54  Phos  2.3     04-15  Mg     2.1     04-15    TPro  6.3  /  Alb  3.1<L>  /  TBili  0.7  /  DBili  0.2  /  AST  30  /  ALT  21  /  AlkPhos  78  04-15      CARDIAC MARKERS ( 15 Apr 2018 14:52 )  x     / <0.01 ng/mL / 365 U/L / x     / 3.3 ng/mL  CARDIAC MARKERS ( 15 Apr 2018 01:47 )  x     / 0.02 ng/mL / 162 U/L / x     / 2.9 ng/mL  CARDIAC MARKERS ( 2018 19:25 )  x     / 0.03 ng/mL / 143 U/L / x     / 2.5 ng/mL  CARDIAC MARKERS ( 2018 11:53 )  x     / <0.01 ng/mL / x     / x     / x          Serum Pro-Brain Natriuretic Peptide: 1229 pg/mL ( @ 11:53)    Total Cholesterol: 109  LDL: 42  HDL: 41  T    Hemoglobin A1C, Whole Blood: 6.0 % (04-15 @ 17:42)    Thyroid Stimulating Hormone, Serum: 0.58 uIU/mL (04-15 @ 17:42)

## 2018-04-16 NOTE — PROGRESS NOTE ADULT - SUBJECTIVE AND OBJECTIVE BOX
[facility] NEPHROLOGY- PROGRESS NOTE    89 year old male with history of prostate CA presents with SOB due to parainfluenza and volume overload. Nephrology consulted for elevated Scr.    REVIEW OF SYSTEMS:  Gen: no changes in weight  Cards: no chest pain  Resp: + dyspnea improving  GI: no nausea or vomiting or diarrhea  Vascular: + LE edema improving    penicillin (Unknown)      Hospital Medications: Medications reviewed    VITALS:  T(F): 97.4 (18 @ 06:40), Max: 99.3 (04-15-18 @ 20:59)  HR: 70 (18 @ 10:13)  BP: 106/69 (18 @ 06:40)  RR: 18 (18 @ 06:40)  SpO2: 96% (18 @ 10:13)  Wt(kg): --  Height (cm): 172.72 (04-15 @ 05:22)  Weight (kg): 74.8 (04-15 @ 05:22)  BMI (kg/m2): 25.1 (04-15 @ 05:22)  BSA (m2): 1.88 (04-15 @ 05:22)    04-15 @ 07:01  -   @ 07:00  --------------------------------------------------------  IN: 1080 mL / OUT: 900 mL / NET: 180 mL        PHYSICAL EXAM:    Gen: NAD, calm  Cards: RRR, +S1/S2, no M/G/R  Resp: course BS B/L  GI: soft, NT/ND, NABS  Vascular: trace LE edema B/L    LABS:      138  |  99  |  25<H>  ----------------------------<  104<H>  4.1   |  28  |  1.59<H>    Ca    8.4      2018 06:54  Phos  2.3     04-15  Mg     2.1     04-15    TPro  6.3  /  Alb  3.1<L>  /  TBili  0.7  /  DBili  0.2  /  AST  30  /  ALT  21  /  AlkPhos  78  04-15    Creatinine Trend: 1.59 <--, 1.48 <--, 1.42 <--                        10.5   4.61  )-----------( 184      ( 2018 07:31 )             33.6     Urine Studies:  Urinalysis Basic - ( 2018 21:55 )    Color: Yellow / Appearance: Clear / S.010 / pH:   Gluc:  / Ketone: Negative  / Bili: Negative / Urobili: Negative mg/dL   Blood:  / Protein: Negative mg/dL / Nitrite: Negative   Leuk Esterase: Small / RBC: 14 /HPF / WBC 6 /HPF   Sq Epi:  / Non Sq Epi: 0 /HPF / Bacteria: Negative

## 2018-04-17 DIAGNOSIS — N39.0 URINARY TRACT INFECTION, SITE NOT SPECIFIED: ICD-10-CM

## 2018-04-17 LAB
ANION GAP SERPL CALC-SCNC: 11 MMOL/L — SIGNIFICANT CHANGE UP (ref 5–17)
BUN SERPL-MCNC: 23 MG/DL — SIGNIFICANT CHANGE UP (ref 7–23)
CALCIUM SERPL-MCNC: 8.3 MG/DL — LOW (ref 8.4–10.5)
CHLORIDE SERPL-SCNC: 98 MMOL/L — SIGNIFICANT CHANGE UP (ref 96–108)
CO2 SERPL-SCNC: 25 MMOL/L — SIGNIFICANT CHANGE UP (ref 22–31)
CREAT SERPL-MCNC: 1.42 MG/DL — HIGH (ref 0.5–1.3)
GLUCOSE SERPL-MCNC: 104 MG/DL — HIGH (ref 70–99)
HCT VFR BLD CALC: 33.4 % — LOW (ref 39–50)
HGB BLD-MCNC: 10.4 G/DL — LOW (ref 13–17)
MCHC RBC-ENTMCNC: 29.5 PG — SIGNIFICANT CHANGE UP (ref 27–34)
MCHC RBC-ENTMCNC: 31.1 GM/DL — LOW (ref 32–36)
MCV RBC AUTO: 94.6 FL — SIGNIFICANT CHANGE UP (ref 80–100)
PLATELET # BLD AUTO: 193 K/UL — SIGNIFICANT CHANGE UP (ref 150–400)
POTASSIUM SERPL-MCNC: 4.4 MMOL/L — SIGNIFICANT CHANGE UP (ref 3.5–5.3)
POTASSIUM SERPL-SCNC: 4.4 MMOL/L — SIGNIFICANT CHANGE UP (ref 3.5–5.3)
RBC # BLD: 3.53 M/UL — LOW (ref 4.2–5.8)
RBC # FLD: 14.3 % — SIGNIFICANT CHANGE UP (ref 10.3–14.5)
SODIUM SERPL-SCNC: 134 MMOL/L — LOW (ref 135–145)
WBC # BLD: 5.86 K/UL — SIGNIFICANT CHANGE UP (ref 3.8–10.5)
WBC # FLD AUTO: 5.86 K/UL — SIGNIFICANT CHANGE UP (ref 3.8–10.5)

## 2018-04-17 PROCEDURE — 99233 SBSQ HOSP IP/OBS HIGH 50: CPT

## 2018-04-17 PROCEDURE — 93010 ELECTROCARDIOGRAM REPORT: CPT

## 2018-04-17 PROCEDURE — 76705 ECHO EXAM OF ABDOMEN: CPT | Mod: 26

## 2018-04-17 RX ADMIN — Medication 3 MILLILITER(S): at 17:38

## 2018-04-17 RX ADMIN — Medication 3 MILLILITER(S): at 23:26

## 2018-04-17 RX ADMIN — HEPARIN SODIUM 5000 UNIT(S): 5000 INJECTION INTRAVENOUS; SUBCUTANEOUS at 17:38

## 2018-04-17 RX ADMIN — Medication 0.5 MILLIGRAM(S): at 17:38

## 2018-04-17 RX ADMIN — HEPARIN SODIUM 5000 UNIT(S): 5000 INJECTION INTRAVENOUS; SUBCUTANEOUS at 05:43

## 2018-04-17 RX ADMIN — Medication 3 MILLILITER(S): at 00:54

## 2018-04-17 RX ADMIN — Medication 0.5 MILLIGRAM(S): at 05:43

## 2018-04-17 RX ADMIN — Medication 3 MILLILITER(S): at 11:18

## 2018-04-17 RX ADMIN — GABAPENTIN 300 MILLIGRAM(S): 400 CAPSULE ORAL at 17:38

## 2018-04-17 RX ADMIN — Medication 100 MILLIGRAM(S): at 05:43

## 2018-04-17 RX ADMIN — Medication 1 TABLET(S): at 11:17

## 2018-04-17 RX ADMIN — RIVAROXABAN 15 MILLIGRAM(S): KIT at 21:30

## 2018-04-17 RX ADMIN — ATORVASTATIN CALCIUM 20 MILLIGRAM(S): 80 TABLET, FILM COATED ORAL at 21:30

## 2018-04-17 RX ADMIN — PANTOPRAZOLE SODIUM 40 MILLIGRAM(S): 20 TABLET, DELAYED RELEASE ORAL at 05:44

## 2018-04-17 RX ADMIN — TAMSULOSIN HYDROCHLORIDE 0.8 MILLIGRAM(S): 0.4 CAPSULE ORAL at 11:18

## 2018-04-17 RX ADMIN — Medication 250 MILLIGRAM(S): at 21:30

## 2018-04-17 RX ADMIN — GABAPENTIN 300 MILLIGRAM(S): 400 CAPSULE ORAL at 05:43

## 2018-04-17 RX ADMIN — Medication 3 MILLILITER(S): at 05:43

## 2018-04-17 RX ADMIN — Medication 100 MILLIGRAM(S): at 17:38

## 2018-04-17 RX ADMIN — ATENOLOL 12.5 MILLIGRAM(S): 25 TABLET ORAL at 05:43

## 2018-04-17 NOTE — PROGRESS NOTE ADULT - SUBJECTIVE AND OBJECTIVE BOX
Patient seen and examined at bedside  Outpt UCx results appreciated - MRSA - vanco initiated  Case discussed with medical team    HPI:  89 yo M from home with PMhx HTN, HLD, MI X 2, CHF of unspecified EF, Prostate Cx (?w/ mets), who p/w several days difficulty breathing, sob, productive cough of white sputum, increasing abdominal distention, and pedal edema.  Pt recently stopped taking diuretics 2/2 adverse effects.  In ED, pt w/ s/s fluid overload, with increasing work of breathing, s/p lasix, vasotec, and put on BiPAP. 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; 1997      penicillin (Unknown)       MEDICATIONS  (STANDING):  ALBUTerol/ipratropium for Nebulization 3 milliLiter(s) Nebulizer every 6 hours  ATENolol  Tablet 12.5 milliGRAM(s) Oral daily  atorvastatin 20 milliGRAM(s) Oral at bedtime  buDESOnide   0.5 milliGRAM(s) Respule 0.5 milliGRAM(s) Inhalation every 12 hours  docusate sodium 100 milliGRAM(s) Oral two times a day  gabapentin 300 milliGRAM(s) Oral two times a day  heparin  Injectable 5000 Unit(s) SubCutaneous every 12 hours  multivitamin/minerals 1 Tablet(s) Oral daily  pantoprazole    Tablet 40 milliGRAM(s) Oral before breakfast  rivaroxaban 15 milliGRAM(s) Oral <User Schedule>  tamsulosin 0.8 milliGRAM(s) Oral daily  vancomycin  IVPB 1000 milliGRAM(s) IV Intermittent every 24 hours    MEDICATIONS  (PRN):  acetaminophen   Tablet. 650 milliGRAM(s) Oral every 6 hours PRN Mild Pain (1 - 3)  triazolam 0.25 milliGRAM(s) Oral at bedtime PRN Insomnia      REVIEW OF SYSTEMS:  CONSTITUTIONAL: (+) malaise.   EYES: No acute change in vision   ENT:  No tinnitus  NECK: No stiffness  RESPIRATORY: No hemoptysis  CARDIOVASCULAR: No chest pain,, syncope  GASTROINTESTINAL: No hematemesis, diarrhea, melena, or hematochezia.  GENITOURINARY: urinary frequency. No hematuria  NEUROLOGICAL: No headaches  LYMPH Nodes: No enlarged glands  ENDOCRINE: No heat or cold intolerance	    T(C): 36.4 (04-17-18 @ 11:52), Max: 37.1 (04-17-18 @ 05:41)  HR: 80 (04-17-18 @ 11:52) (70 - 80)  BP: 119/69 (04-17-18 @ 11:52) (101/57 - 119/69)  RR: 18 (04-17-18 @ 11:52) (18 - 18)  SpO2: 97% (04-17-18 @ 11:52) (95% - 97%)    PHYSICAL EXAMINATION:   Constitutional: WD, NAD  HEENT: NC, AT  Neck:  Supple  Respiratory: dyspnea on exertion, audible cough, bibasilar rales. Adequate airflow b/l. Not using accessory muscles of respiration.  Cardiovascular:  S1 & S2 intact, no R/G, 2+ radial pulses b/l  Gastrointestinal: Soft, NT, normoactive b.s., no organomegaly/RT/rigidity  Extremities: WWP  Neurological:  Alert and awake.  No acute focal motor deficits. Crude sensation intact.     Labs and imaging reviewed    LABS:                        10.4   5.86  )-----------( 193      ( 17 Apr 2018 07:39 )             33.4     04-17    134<L>  |  98  |  23  ----------------------------<  104<H>  4.4   |  25  |  1.42<H>    Ca    8.3<L>      17 Apr 2018 07:02              CAPILLARY BLOOD GLUCOSE            LIVER FUNCTIONS - ( 15 Apr 2018 17:42 )  Alb: x     / Pro: x     / ALK PHOS: x     / ALT: x     / AST: x     / GGT: 19 U/L           RADIOLOGY & ADDITIONAL STUDIES:

## 2018-04-17 NOTE — PROGRESS NOTE ADULT - SUBJECTIVE AND OBJECTIVE BOX
Patient seen and evaluated @ 845  Chief Complaint: Patient is a 89y old  Male who presents with a chief complaint of SOB, found to have influenza    HPI:  89 yo M from home with PMhx HTN, HLD, MI X 2, CHF of unspecified EF, Prostate Cx (?w/ mets), who p/w several days difficulty breathing, sob, productive cough of white sputum, increasing abdominal distention, and pedal edema.  Pt recently stopped taking diuretics 2/2 adverse effects.  In ED, pt w/ s/s fluid overload, with increasing work of breathing, s/p lasix, vasotec, and put on BiPAP. (2018 14:54)    Recently diagnosed and treated for prostate CA. Severe edema from lupron. Developed atrial flutter 2018. Treated with Aldactazide. Elevated BUN/Cr and K+ 5.7 so Losartan stopped and a;dactazide changed to MWF but daughter D/C'ed totally around 3-4 weeks ago.  Developed influenza with wheezing abd SOB so to ER on Sat .    PMH:   HLD (hyperlipidemia)  HTN (hypertension)  Cancer of prostate  MI, old    PSH:     Medications:   acetaminophen   Tablet. 650 milliGRAM(s) Oral every 6 hours PRN  ALBUTerol/ipratropium for Nebulization 3 milliLiter(s) Nebulizer every 6 hours  ATENolol  Tablet 12.5 milliGRAM(s) Oral daily  atorvastatin 20 milliGRAM(s) Oral at bedtime  buDESOnide   0.5 milliGRAM(s) Respule 0.5 milliGRAM(s) Inhalation every 12 hours  cetylpyridinium 0.05% Oral Solution 20 milliLiter(s) Swish and Spit once  docusate sodium 100 milliGRAM(s) Oral two times a day  gabapentin 300 milliGRAM(s) Oral two times a day  heparin  Injectable 5000 Unit(s) SubCutaneous every 12 hours  multivitamin/minerals 1 Tablet(s) Oral daily  pantoprazole    Tablet 40 milliGRAM(s) Oral before breakfast  rivaroxaban 15 milliGRAM(s) Oral <User Schedule>  tamsulosin 0.8 milliGRAM(s) Oral daily  triazolam 0.25 milliGRAM(s) Oral at bedtime PRN  vancomycin  IVPB 1000 milliGRAM(s) IV Intermittent every 24 hours    Allergies:  penicillin   ACE inhibitors    FAMILY HISTORY:    Social History: .   Smoking: Remote  Alcohol: No	  Drugs: No    Physical Exam:  Vital Signs Last 24 Hrs  T(C): 37.1 (2018 05:41), Max: 37.1 (2018 05:41)  T(F): 98.8 (2018 05:41), Max: 98.8 (2018 05:41)  HR: 78 (2018 08:11) (70 - 79)  BP: 106/57 (2018 05:41) (90/52 - 106/57)  BP(mean): --  RR: 18 (2018 05:41) (18 - 18)  SpO2: 96% (2018 08:11) (93% - 96%)    Daily     Daily Weight in k.8 (2018 07:31)    Appearance: WD, WN, NAD  Eyes:  No scleral icterus. PERRL, EOMI  HENT: Normal oral mucosa.]NC/AT  Neck: No JVD at 90 degrees. (+) F waves Normal carotid upstrokes without bruits or murmurs.  Cardiovascular: Normal PMI. Normal S1, S2 physiologically split. No S3 or S4. No murmurs.  Respiratory: No ronchi No wheezes. No rales today.  Gastrointestinal: Soft.  Non-tender. No hepatosplenomegally.  Extremities: No clubbing. No edema today. Pulses 2+ bilaterally symmetric, ? pedal.  Musculoskeletal:  No joint deformity   Neurologic: Non-focal  Lymphatic:  No lymphadenopathy  Psychiatry: [+ ] AAOx3 [ +] Mood & affect appropriate  Skin: No rashes. No ecchymoses. No cyanosis    Cardiovascular Diagnostic Testing:  ECG:   < from: 12 Lead ECG (18 @ 11:19) >  Ventricular Rate 95 BPM    Atrial Rate 120 BPM    QRS Duration 122 ms     ms    QTc 469 ms    R Axis 52 degrees    T Axis -96 degrees    Diagnosis Line WIDE QRS RHYTHM  RIGHT BUNDLE BRANCH BLOCK  T WAVE ABNORMALITY, CONSIDER INFEROLATERAL ISCHEMIA  ABNORMAL ECG    < end of copied text >    Echo: 3/1/2016 Mild-mod MR. Mild-mod AI. Akinetic inferior and inferoseptum. LVEF 55%. Normal RV size and function. No pericardial effusion.    Stress Testin. Fixed inferior defect. No ischemia. LVEF 62%.    Cath: . 30% LAD. Totally occluded RCA. filling via collaterals.    Interpretation of Telemetry: Atrial flutter with MVR.    Imaging: < from: CT Chest No Cont (04.15.18 @ 17:10) >  INTERPRETATION:  Moderate bilateral pleural effusions with adjacent   atelectasis.    Scattered indeterminate subcentimeter solid nodules for example of the   right lower lobe on series 3 image 100 and left lower lobe adjacent to   the fissure series 3 image 70.    Tracemildly complex pericardial effusion, increased since 2017.   Borderline dilatation of the midascending aorta to 4.0cm.  Aortic valve   calcification.  Correlate with echocardiography as clinically warrented.      < end of copied text >      Labs:                                          10.4   5.86  )-----------( 193      ( 2018 07:39 )             33.4     04-17    134<L>  |  98  |  23  ----------------------------<  104<H>  4.4   |  25  |  1.42<H>    Ca    8.3<L>      2018 07:02  Phos  2.3     04-15  Mg     2.1     -15    TPro  6.3  /  Alb  3.1<L>  /  TBili  0.7  /  DBili  0.2  /  AST  30  /  ALT  21  /  AlkPhos  78  04-15    CARDIAC MARKERS ( 15 Apr 2018 14:52 )  x     / <0.01 ng/mL / 365 U/L / x     / 3.3 ng/mL Patient seen and evaluated @ 845  Chief Complaint: Patient is a 89y old  Male who presents with a chief complaint of SOB, found to have influenza    HPI:  89 yo M from home with PMhx HTN, HLD, MI X 2, CHF of unspecified EF, Prostate Cx (?w/ mets), who p/w several days difficulty breathing, sob, productive cough of white sputum, increasing abdominal distention, and pedal edema.  Pt recently stopped taking diuretics 2/2 adverse effects.  In ED, pt w/ s/s fluid overload, with increasing work of breathing, s/p lasix, vasotec, and put on BiPAP. (2018 14:54)    Recently diagnosed and treated for prostate CA. Severe edema from lupron. Developed atrial flutter 2018. Treated with Aldactazide. Elevated BUN/Cr and K+ 5.7 so Losartan stopped and a;dactazide changed to MWF but daughter D/C'ed totally around 3-4 weeks ago.  Developed influenza with wheezing abd SOB so to ER on Sat .    PMH:   HLD (hyperlipidemia)  HTN (hypertension)  Cancer of prostate  MI, old    PSH:     Medications:   acetaminophen   Tablet. 650 milliGRAM(s) Oral every 6 hours PRN  ALBUTerol/ipratropium for Nebulization 3 milliLiter(s) Nebulizer every 6 hours  ATENolol  Tablet 12.5 milliGRAM(s) Oral daily  atorvastatin 20 milliGRAM(s) Oral at bedtime  buDESOnide   0.5 milliGRAM(s) Respule 0.5 milliGRAM(s) Inhalation every 12 hours  cetylpyridinium 0.05% Oral Solution 20 milliLiter(s) Swish and Spit once  docusate sodium 100 milliGRAM(s) Oral two times a day  gabapentin 300 milliGRAM(s) Oral two times a day  heparin  Injectable 5000 Unit(s) SubCutaneous every 12 hours  multivitamin/minerals 1 Tablet(s) Oral daily  pantoprazole    Tablet 40 milliGRAM(s) Oral before breakfast  rivaroxaban 15 milliGRAM(s) Oral <User Schedule>  tamsulosin 0.8 milliGRAM(s) Oral daily  triazolam 0.25 milliGRAM(s) Oral at bedtime PRN  vancomycin  IVPB 1000 milliGRAM(s) IV Intermittent every 24 hours    Allergies:  penicillin   ACE inhibitors    FAMILY HISTORY:    Social History: .   Smoking: Remote  Alcohol: No	  Drugs: No    Physical Exam:  Vital Signs Last 24 Hrs  T(C): 37.1 (2018 05:41), Max: 37.1 (2018 05:41)  T(F): 98.8 (2018 05:41), Max: 98.8 (2018 05:41)  HR: 78 (2018 08:11) (70 - 79)  BP: 106/57 (2018 05:41) (90/52 - 106/57)  BP(mean): --  RR: 18 (2018 05:41) (18 - 18)  SpO2: 96% (2018 08:11) (93% - 96%)    Daily     Daily Weight in k.8 (2018 07:31)    Appearance: WD, WN, NAD  Eyes:  No scleral icterus. PERRL, EOMI  HENT: Normal oral mucosa.]NC/AT  Neck: No JVD at 90 degrees. (+) F waves Normal carotid upstrokes without bruits or murmurs.  Cardiovascular: Normal PMI. Normal S1, S2 physiologically split. No S3 or S4. No murmurs.  Respiratory: Loud ronchi No wheezes. No rales today.  Gastrointestinal: Soft.  Non-tender. No hepatosplenomegally.  Extremities: No clubbing. No edema today. Pulses 2+ bilaterally symmetric, ? pedal.  Musculoskeletal:  No joint deformity   Neurologic: Non-focal  Lymphatic:  No lymphadenopathy  Psychiatry: [+ ] AAOx3 [ +] Mood & affect appropriate  Skin: No rashes. No ecchymoses. No cyanosis    Cardiovascular Diagnostic Testing:  ECG:   < from: 12 Lead ECG (18 @ 11:19) >  Ventricular Rate 95 BPM    Atrial Rate 120 BPM    QRS Duration 122 ms     ms    QTc 469 ms    R Axis 52 degrees    T Axis -96 degrees    Diagnosis Line WIDE QRS RHYTHM  RIGHT BUNDLE BRANCH BLOCK  T WAVE ABNORMALITY, CONSIDER INFEROLATERAL ISCHEMIA  ABNORMAL ECG    < end of copied text >    Echo: 3/1/2016 Mild-mod MR. Mild-mod AI. Akinetic inferior and inferoseptum. LVEF 55%. Normal RV size and function. No pericardial effusion.    Stress Testin. Fixed inferior defect. No ischemia. LVEF 62%.    Cath: . 30% LAD. Totally occluded RCA. filling via collaterals.    Interpretation of Telemetry: Atrial flutter with MVR.    Imaging: < from: CT Chest No Cont (04.15.18 @ 17:10) >  INTERPRETATION:  Moderate bilateral pleural effusions with adjacent   atelectasis.    Scattered indeterminate subcentimeter solid nodules for example of the   right lower lobe on series 3 image 100 and left lower lobe adjacent to   the fissure series 3 image 70.    Tracemildly complex pericardial effusion, increased since 2017.   Borderline dilatation of the midascending aorta to 4.0cm.  Aortic valve   calcification.  Correlate with echocardiography as clinically warrented.      < end of copied text >      Labs:                                          10.4   5.86  )-----------( 193      ( 2018 07:39 )             33.4     04-17    134<L>  |  98  |  23  ----------------------------<  104<H>  4.4   |  25  |  1.42<H>    Ca    8.3<L>      2018 07:02  Phos  2.3     04-15  Mg     2.1     -15    TPro  6.3  /  Alb  3.1<L>  /  TBili  0.7  /  DBili  0.2  /  AST  30  /  ALT  21  /  AlkPhos  78  04-15    CARDIAC MARKERS ( 15 Apr 2018 14:52 )  x     / <0.01 ng/mL / 365 U/L / x     / 3.3 ng/mL

## 2018-04-17 NOTE — PROGRESS NOTE ADULT - SUBJECTIVE AND OBJECTIVE BOX
Follow-up Pulm Progress Note    No new respiratory events overnight.  Denies increased SOB, chest pain, cough or mucus. still with cough, congestion, sob- but all slightly improved    Medications:  MEDICATIONS  (STANDING):  ALBUTerol/ipratropium for Nebulization 3 milliLiter(s) Nebulizer every 6 hours  ATENolol  Tablet 12.5 milliGRAM(s) Oral daily  atorvastatin 20 milliGRAM(s) Oral at bedtime  buDESOnide   0.5 milliGRAM(s) Respule 0.5 milliGRAM(s) Inhalation every 12 hours  cetylpyridinium 0.05% Oral Solution 20 milliLiter(s) Swish and Spit once  docusate sodium 100 milliGRAM(s) Oral two times a day  gabapentin 300 milliGRAM(s) Oral two times a day  heparin  Injectable 5000 Unit(s) SubCutaneous every 12 hours  multivitamin/minerals 1 Tablet(s) Oral daily  pantoprazole    Tablet 40 milliGRAM(s) Oral before breakfast  rivaroxaban 15 milliGRAM(s) Oral <User Schedule>  tamsulosin 0.8 milliGRAM(s) Oral daily  vancomycin  IVPB 1000 milliGRAM(s) IV Intermittent every 24 hours    MEDICATIONS  (PRN):  acetaminophen   Tablet. 650 milliGRAM(s) Oral every 6 hours PRN Mild Pain (1 - 3)  triazolam 0.25 milliGRAM(s) Oral at bedtime PRN Insomnia      Vent settings (if applicable)      Vital Signs Last 24 Hrs  T(C): 37.1 (17 Apr 2018 05:41), Max: 37.1 (17 Apr 2018 05:41)  T(F): 98.8 (17 Apr 2018 05:41), Max: 98.8 (17 Apr 2018 05:41)  HR: 78 (17 Apr 2018 08:11) (70 - 79)  BP: 106/57 (17 Apr 2018 05:41) (90/52 - 106/57)  BP(mean): --  RR: 18 (17 Apr 2018 05:41) (18 - 18)  SpO2: 96% (17 Apr 2018 08:11) (93% - 96%)          04-16 @ 07:01  -  04-17 @ 07:00  --------------------------------------------------------  IN: 470 mL / OUT: 0 mL / NET: 470 mL          LABS:                        10.4   5.86  )-----------( 193      ( 17 Apr 2018 07:39 )             33.4     04-17    134<L>  |  98  |  23  ----------------------------<  104<H>  4.4   |  25  |  1.42<H>    Ca    8.3<L>      17 Apr 2018 07:02  Phos  2.3     04-15  Mg     2.1     04-15    TPro  6.3  /  Alb  3.1<L>  /  TBili  0.7  /  DBili  0.2  /  AST  30  /  ALT  21  /  AlkPhos  78  04-15      CARDIAC MARKERS ( 15 Apr 2018 14:52 )  x     / <0.01 ng/mL / 365 U/L / x     / 3.3 ng/mL      CAPILLARY BLOOD GLUCOSE              Serum Pro-Brain Natriuretic Peptide: 1229 pg/mL (04-14-18 @ 11:53)      CULTURES:        Physical Examination:  Awake and alert, generally comfortable  HEENT: unremarkable  PULM: Coarse BS and decreased bases to auscultation bilaterally, no significant sputum production  CVS: Regular rate and rhythm, no murmurs, rubs, or gallops  Abd:  soft, non tender  Extrem: No CCE    RADIOLOGY REVIEWED  CXR:    CT chest:  < from: CT Chest No Cont (04.15.18 @ 17:10) >  EXAM:  CT CHEST                            PROCEDURE DATE:  04/15/2018            INTERPRETATION:  CLINICAL INFORMATION: Shortness of breath. Wheezing.   Persistent symptoms despite diuresis.    COMPARISON: None.    PROCEDURE:   CT Angiography of the Chest.  90 ml of Omnipaque 350 was injected intravenously. 10 ml were discarded.  Sagittal and coronal reformats were performed as well as 3D (MIP)   reconstructions.    FINDINGS:    CHEST:     LUNGS AND LARGE AIRWAYS: Patent central airways. Bilateral lower lobe   compressive atelectasis. Linear atelectasis in the right middle lobe and   lingula. Mild bronchiectasis.  PLEURA: Moderate bilateral pleural effusions.  VESSELS: Atherosclerotic changes of the thoracic aorta and coronary   arteries.  HEART: Cardiomegaly. Small pericardial effusion.  MEDIASTINUM AND DANYA: No lymphadenopathy.  CHEST WALL AND LOWER NECK: Within normal limits.  VISUALIZED UPPER ABDOMEN: Within normal limits.  BONES: Muscle degenerative disease.    IMPRESSION: Moderate bilateral pleural effusions with adjacent   compressive atelectasis.    Linear atelectasis in the right middle lobe and lingula.                LUIS GRAY M.D., RADIOLOGY RESIDENT  This document has been electronically signed.  AZAEL GUTIERREZ M.D., ATTENDING RADIOLOGIST  This document has been electronically signed. Apr 16 2018 12:18PM              < end of copied text >

## 2018-04-17 NOTE — PROGRESS NOTE ADULT - ASSESSMENT
Patient is a 89 yo Male with multiple co-morbidities including Prostate Cancer on lupron, who presents to the ER for evaluation of several days difficulty breathing, productive cough and increasing abdominal distention, and pedal edema.  In the ER, he found to have positive Parainfluenza 3. The ID consult requested after outpatient urine culture became positive with MRSA to assist with antibiotic management.       # Parainfluenza 3  # UTI - MRSA    Would recommend:  1. Continue Vancomycin to cover MRSA inpatient and May change to oral on discharge   2. Supportive care for parainfluenza 3  3. Continue Supplemental oxygenation and bronchodilator as needed  4. Aspiration precaution    d/w patient and his daughter at the bed side    will follow the patient with you Patient is a 89 yo Male with multiple co-morbidities including Prostate Cancer on lupron, who presents to the ER for evaluation of several days difficulty breathing, productive cough and increasing abdominal distention, and pedal edema.  In the ER, he found to have positive Parainfluenza 3. The ID consult requested after outpatient urine culture became positive with MRSA to assist with antibiotic management.       # Parainfluenza 3  # UTI - MRSA    Would recommend:  1. Continue Vancomycin to cover MRSA inpatient and May change to oral Bactrim on discharge   2. Supportive care for parainfluenza 3  3. Continue Supplemental oxygenation and bronchodilator as needed  4. Aspiration precaution    d/w patient and his daughter at the bed side    will follow the patient with you

## 2018-04-17 NOTE — PROGRESS NOTE ADULT - SUBJECTIVE AND OBJECTIVE BOX
[facility] NEPHROLOGY- PROGRESS NOTE    89 year old male with history of prostate CA presents with SOB due to parainfluenza and volume overload. Nephrology consulted for elevated Scr.    REVIEW OF SYSTEMS:  Gen: no changes in weight  Cards: no chest pain  Resp: + dyspnea improving  GI: no nausea or vomiting or diarrhea  Vascular: + LE edema improving    penicillin (Unknown)      Hospital Medications: Medications reviewed      VITALS:  T(F): 97.5 (04-17-18 @ 11:52), Max: 98.8 (04-17-18 @ 05:41)  HR: 80 (04-17-18 @ 11:52)  BP: 119/69 (04-17-18 @ 11:52)  RR: 18 (04-17-18 @ 11:52)  SpO2: 97% (04-17-18 @ 11:52)  Wt(kg): --    04-16 @ 07:01  -  04-17 @ 07:00  --------------------------------------------------------  IN: 470 mL / OUT: 0 mL / NET: 470 mL      PHYSICAL EXAM:    Gen: NAD, calm  Cards: RRR, +S1/S2, no M/G/R  Resp: course BS B/L  GI: soft, NT/ND, NABS  Vascular: trace LE edema B/L      LABS:  04-17    134<L>  |  98  |  23  ----------------------------<  104<H>  4.4   |  25  |  1.42<H>    Ca    8.3<L>      17 Apr 2018 07:02  Phos  2.3     04-15  Mg     2.1     04-15    TPro  6.3  /  Alb  3.1<L>  /  TBili  0.7  /  DBili  0.2  /  AST  30  /  ALT  21  /  AlkPhos  78  04-15    Creatinine Trend: 1.42 <--, 1.59 <--, 1.48 <--, 1.42 <--                        10.4   5.86  )-----------( 193      ( 17 Apr 2018 07:39 )             33.4

## 2018-04-17 NOTE — PROGRESS NOTE ADULT - ASSESSMENT
Parainfluenza infection complicated by CHF and bronchial hyperreactivity.  The patient had been hypoxic requiring bipap, but he has improved with diuresis and nebulizer rx, currently, he is tolerating NC O2 and feeling better.  He would like to ambulate more    Supplemental O2 to maintain saturation 92% or above watching for evidence of CO2 retention.  Cautious diuresis and CV management per cardiology and nephrology  Continue duoneb Q6  Budesonide 0.5 mg via neb bid.  Elevating HOB.   Following CxR- improved  CT official with bilateral effusions/fluid overload- likely contributing to dyspnea, cough   Standard GI/DVT prophylaxis.  increase ambulation-into hallways once able  incentive spirometry  can check O2 on room air- hopefully pt will improve over the next several days      Claudia Rodriguez MD  Pulmonary Medicine  [phone]

## 2018-04-18 LAB
ANION GAP SERPL CALC-SCNC: 10 MMOL/L — SIGNIFICANT CHANGE UP (ref 5–17)
BUN SERPL-MCNC: 19 MG/DL — SIGNIFICANT CHANGE UP (ref 7–23)
CALCIUM SERPL-MCNC: 8.1 MG/DL — LOW (ref 8.4–10.5)
CHLORIDE SERPL-SCNC: 102 MMOL/L — SIGNIFICANT CHANGE UP (ref 96–108)
CO2 SERPL-SCNC: 26 MMOL/L — SIGNIFICANT CHANGE UP (ref 22–31)
CREAT SERPL-MCNC: 1.24 MG/DL — SIGNIFICANT CHANGE UP (ref 0.5–1.3)
GLUCOSE SERPL-MCNC: 108 MG/DL — HIGH (ref 70–99)
HCT VFR BLD CALC: 32.6 % — LOW (ref 39–50)
HGB BLD-MCNC: 10 G/DL — LOW (ref 13–17)
MCHC RBC-ENTMCNC: 29.4 PG — SIGNIFICANT CHANGE UP (ref 27–34)
MCHC RBC-ENTMCNC: 30.7 GM/DL — LOW (ref 32–36)
MCV RBC AUTO: 95.9 FL — SIGNIFICANT CHANGE UP (ref 80–100)
PLATELET # BLD AUTO: 180 K/UL — SIGNIFICANT CHANGE UP (ref 150–400)
POTASSIUM SERPL-MCNC: 4.1 MMOL/L — SIGNIFICANT CHANGE UP (ref 3.5–5.3)
POTASSIUM SERPL-SCNC: 4.1 MMOL/L — SIGNIFICANT CHANGE UP (ref 3.5–5.3)
RBC # BLD: 3.4 M/UL — LOW (ref 4.2–5.8)
RBC # FLD: 14.2 % — SIGNIFICANT CHANGE UP (ref 10.3–14.5)
SODIUM SERPL-SCNC: 138 MMOL/L — SIGNIFICANT CHANGE UP (ref 135–145)
WBC # BLD: 4.42 K/UL — SIGNIFICANT CHANGE UP (ref 3.8–10.5)
WBC # FLD AUTO: 4.42 K/UL — SIGNIFICANT CHANGE UP (ref 3.8–10.5)

## 2018-04-18 PROCEDURE — 93306 TTE W/DOPPLER COMPLETE: CPT | Mod: 26

## 2018-04-18 PROCEDURE — 99232 SBSQ HOSP IP/OBS MODERATE 35: CPT

## 2018-04-18 PROCEDURE — 71046 X-RAY EXAM CHEST 2 VIEWS: CPT | Mod: 26

## 2018-04-18 RX ORDER — CALCIUM GLUCONATE 100 MG/ML
1 VIAL (ML) INTRAVENOUS ONCE
Qty: 0 | Refills: 0 | Status: COMPLETED | OUTPATIENT
Start: 2018-04-18 | End: 2018-04-18

## 2018-04-18 RX ADMIN — ATORVASTATIN CALCIUM 20 MILLIGRAM(S): 80 TABLET, FILM COATED ORAL at 22:28

## 2018-04-18 RX ADMIN — Medication 3 MILLILITER(S): at 17:45

## 2018-04-18 RX ADMIN — Medication 100 MILLIGRAM(S): at 06:18

## 2018-04-18 RX ADMIN — TAMSULOSIN HYDROCHLORIDE 0.8 MILLIGRAM(S): 0.4 CAPSULE ORAL at 11:35

## 2018-04-18 RX ADMIN — Medication 1 TABLET(S): at 11:35

## 2018-04-18 RX ADMIN — Medication 3 MILLILITER(S): at 06:18

## 2018-04-18 RX ADMIN — ATENOLOL 12.5 MILLIGRAM(S): 25 TABLET ORAL at 06:18

## 2018-04-18 RX ADMIN — Medication 250 MILLIGRAM(S): at 22:20

## 2018-04-18 RX ADMIN — Medication 200 GRAM(S): at 19:50

## 2018-04-18 RX ADMIN — HEPARIN SODIUM 5000 UNIT(S): 5000 INJECTION INTRAVENOUS; SUBCUTANEOUS at 17:45

## 2018-04-18 RX ADMIN — HEPARIN SODIUM 5000 UNIT(S): 5000 INJECTION INTRAVENOUS; SUBCUTANEOUS at 06:19

## 2018-04-18 RX ADMIN — Medication 0.5 MILLIGRAM(S): at 06:18

## 2018-04-18 RX ADMIN — Medication 3 MILLILITER(S): at 11:35

## 2018-04-18 RX ADMIN — Medication 100 MILLIGRAM(S): at 17:45

## 2018-04-18 RX ADMIN — PANTOPRAZOLE SODIUM 40 MILLIGRAM(S): 20 TABLET, DELAYED RELEASE ORAL at 06:18

## 2018-04-18 RX ADMIN — Medication 0.5 MILLIGRAM(S): at 17:45

## 2018-04-18 RX ADMIN — RIVAROXABAN 15 MILLIGRAM(S): KIT at 22:28

## 2018-04-18 RX ADMIN — GABAPENTIN 300 MILLIGRAM(S): 400 CAPSULE ORAL at 06:18

## 2018-04-18 RX ADMIN — Medication 0.25 MILLIGRAM(S): at 22:19

## 2018-04-18 RX ADMIN — GABAPENTIN 300 MILLIGRAM(S): 400 CAPSULE ORAL at 17:46

## 2018-04-18 NOTE — PROGRESS NOTE ADULT - ASSESSMENT
Patient is a 89 yo Male with multiple co-morbidities including Prostate Cancer on lupron, who presents to the ER for evaluation of several days difficulty breathing, productive cough and increasing abdominal distention, and pedal edema.  In the ER, he found to have positive Parainfluenza 3. The ID consult requested after outpatient urine culture became positive with MRSA to assist with antibiotic management.       # Parainfluenza 3  # UTI - MRSA    Would recommend:  1. Continue Vancomycin to cover MRSA inpatient and May change to oral Bactrim on discharge X 7 more days  2. Supportive care for parainfluenza 3  3. Continue Supplemental oxygenation and bronchodilator as needed  4. Aspiration precaution    d/w patient and his daughter at the bed side    will follow the patient with you Patient is a 89 yo Male with multiple co-morbidities including Prostate Cancer on lupron, who presents to the ER for evaluation of several days difficulty breathing, productive cough and increasing abdominal distention, and pedal edema.  In the ER, he found to have positive Parainfluenza 3. The ID consult requested after outpatient urine culture became positive with MRSA to assist with antibiotic management.       # Parainfluenza 3  # UTI - MRSA    Would recommend:  1. Continue Vancomycin to cover MRSA inpatient and May change to oral Bactrim on discharge X 6 more days  2. Supportive care for parainfluenza 3  3. Continue Supplemental oxygenation and bronchodilator as needed  4. Aspiration precaution    d/w patient and his daughter at the bed side    will follow the patient with you

## 2018-04-18 NOTE — PROGRESS NOTE ADULT - SUBJECTIVE AND OBJECTIVE BOX
Patient is seen and examined at the bed side, is afebrile.        REVIEW OF SYSTEMS: All other review systems are negative        Vital Signs Last 24 Hrs  T(C): 36.6 (2018 21:17), Max: 36.9 (2018 04:30)  T(F): 97.8 (2018 21:17), Max: 98.5 (2018 04:30)  HR: 69 (2018 21:17) (69 - 69)  BP: 123/66 (2018 21:17) (112/60 - 123/66)  BP(mean): --  RR: 18 (2018 21:17) (17 - 18)  SpO2: 95% (2018 21:17) (95% - 100%)        ALLERGIES: PCN          PHYSICAL EXAM:  GENERAL: Not in distress, on NC  CVS: s1 and s2 present  RESP: Air entry B/L  GI: Abdomen soft and nontender  EXT: No pedal edema  CNS: Awake, alert and oriented          LABS:                                   10.0   4.42  )-----------( 180      ( 2018 07:28 )             32.6                           10.4   5.86  )-----------( 193      ( 2018 07:39 )             33.4                10.5   4.61  )-----------( 184      ( 2018 07:31 )             33.6         18    138  |  102  |  19  ----------------------------<  108<H>  4.1   |  26  |  1.24    Ca    8.1<L>      2018 06:43      04-17    134<L>  |  98  |  23  ----------------------------<  104<H>  4.4   |  25  |  1.42<H>    Ca    8.3<L>      2018 07:02        TPro  6.3  /  Alb  3.1<L>  /  TBili  0.7  /  DBili  0.2  /  AST  30  /  ALT  21  /  AlkPhos  78  04-15      Urinalysis Basic - ( 2018 21:55 )    Color: Yellow / Appearance: Clear / S.010 / pH: x  Gluc: x / Ketone: Negative  / Bili: Negative / Urobili: Negative mg/dL   Blood: x / Protein: Negative mg/dL / Nitrite: Negative   Leuk Esterase: Small / RBC: 14 /HPF / WBC 6 /HPF   Sq Epi: x / Non Sq Epi: 0 /HPF / Bacteria: Negative        MEDICATIONS  (STANDING):  ALBUTerol/ipratropium for Nebulization 3 milliLiter(s) Nebulizer every 6 hours  ATENolol  Tablet 12.5 milliGRAM(s) Oral daily  atorvastatin 20 milliGRAM(s) Oral at bedtime  buDESOnide   0.5 milliGRAM(s) Respule 0.5 milliGRAM(s) Inhalation every 12 hours  docusate sodium 100 milliGRAM(s) Oral two times a day  gabapentin 300 milliGRAM(s) Oral two times a day  heparin  Injectable 5000 Unit(s) SubCutaneous every 12 hours  multivitamin/minerals 1 Tablet(s) Oral daily  pantoprazole    Tablet 40 milliGRAM(s) Oral before breakfast  rivaroxaban 15 milliGRAM(s) Oral <User Schedule>  tamsulosin 0.8 milliGRAM(s) Oral daily  vancomycin  IVPB 1000 milliGRAM(s) IV Intermittent every 24 hours          RADIOLOGY & ADDITIONAL TESTS:    4/15/18: CT Chest No Cont (04.15.18 @ 17:10) IMPRESSION: Moderate bilateral pleural effusions with adjacent compressive atelectasis. Linear atelectasis in the right middle lobe and lingula.      18: Xray Chest 1 View- PORTABLE-Routine (18 @ 23:22) Small pleural effusions with adjacent likely atelectasis is unchanged.        MICROBIOLOGY DATA:    Urine Microscopic-Add On (NC) (18 @ 21:55)    Red Blood Cell - Urine: 14 /HPF    White Blood Cell - Urine: 6 /HPF    Bacteria: Negative    Epithelial Cells: 0 /HPF    Rapid Respiratory Viral Panel (18 @ 11:53)    Rapid RVP Result: Detected: The FilmArray RVP Rapid uses polymerase chain reaction (PCR) and melt  curve analysis to screen for adenovirus; coronavirus HKU1, NL63, 229E,  OC43; human metapneumovirus (hMPV); human enterovirus/rhinovirus  (Entero/RV); influenza A; influenza A/H1;influenza A/H3; influenza  A/H1-2009; influenza B; parainfluenza viruses 1, 2, 3, 4; respiratory  syncytial virus; Bordetella pertussis; Mycoplasma pneumoniae; and  Chlamydophila pneumoniae. Patient is seen and examined at the bed side, is afebrile. He is doing better, cough has improved.        REVIEW OF SYSTEMS: All other review systems are negative        Vital Signs Last 24 Hrs  T(C): 36.6 (2018 21:17), Max: 36.9 (2018 04:30)  T(F): 97.8 (2018 21:17), Max: 98.5 (2018 04:30)  HR: 69 (2018 21:17) (69 - 69)  BP: 123/66 (2018 21:17) (112/60 - 123/66)  BP(mean): --  RR: 18 (2018 21:17) (17 - 18)  SpO2: 95% (2018 21:17) (95% - 100%)        ALLERGIES: PCN          PHYSICAL EXAM:  GENERAL: Not in distress, on NC  CVS: s1 and s2 present  RESP: Air entry B/L  GI: Abdomen soft and nontender  EXT: No pedal edema  CNS: Awake, alert and oriented          LABS:                                   10.0   4.42  )-----------( 180      ( 2018 07:28 )             32.6                           10.4   5.86  )-----------( 193      ( 2018 07:39 )             33.4                10.5   4.61  )-----------( 184      ( 2018 07:31 )             33.6         04-18    138  |  102  |  19  ----------------------------<  108<H>  4.1   |  26  |  1.24    Ca    8.1<L>      2018 06:43      04-17    134<L>  |  98  |  23  ----------------------------<  104<H>  4.4   |  25  |  1.42<H>    Ca    8.3<L>      2018 07:02        TPro  6.3  /  Alb  3.1<L>  /  TBili  0.7  /  DBili  0.2  /  AST  30  /  ALT  21  /  AlkPhos  78  04-15      Urinalysis Basic - ( 2018 21:55 )    Color: Yellow / Appearance: Clear / S.010 / pH: x  Gluc: x / Ketone: Negative  / Bili: Negative / Urobili: Negative mg/dL   Blood: x / Protein: Negative mg/dL / Nitrite: Negative   Leuk Esterase: Small / RBC: 14 /HPF / WBC 6 /HPF   Sq Epi: x / Non Sq Epi: 0 /HPF / Bacteria: Negative        MEDICATIONS  (STANDING):  ALBUTerol/ipratropium for Nebulization 3 milliLiter(s) Nebulizer every 6 hours  ATENolol  Tablet 12.5 milliGRAM(s) Oral daily  atorvastatin 20 milliGRAM(s) Oral at bedtime  buDESOnide   0.5 milliGRAM(s) Respule 0.5 milliGRAM(s) Inhalation every 12 hours  docusate sodium 100 milliGRAM(s) Oral two times a day  gabapentin 300 milliGRAM(s) Oral two times a day  heparin  Injectable 5000 Unit(s) SubCutaneous every 12 hours  multivitamin/minerals 1 Tablet(s) Oral daily  pantoprazole    Tablet 40 milliGRAM(s) Oral before breakfast  rivaroxaban 15 milliGRAM(s) Oral <User Schedule>  tamsulosin 0.8 milliGRAM(s) Oral daily  vancomycin  IVPB 1000 milliGRAM(s) IV Intermittent every 24 hours          RADIOLOGY & ADDITIONAL TESTS:    4/15/18: CT Chest No Cont (04.15.18 @ 17:10) IMPRESSION: Moderate bilateral pleural effusions with adjacent compressive atelectasis. Linear atelectasis in the right middle lobe and lingula.      18: Xray Chest 1 View- PORTABLE-Routine (18 @ 23:22) Small pleural effusions with adjacent likely atelectasis is unchanged.        MICROBIOLOGY DATA:    Urine Microscopic-Add On (NC) (18 @ 21:55)    Red Blood Cell - Urine: 14 /HPF    White Blood Cell - Urine: 6 /HPF    Bacteria: Negative    Epithelial Cells: 0 /HPF    Rapid Respiratory Viral Panel (18 @ 11:53)    Rapid RVP Result: Detected: The FilmArray RVP Rapid uses polymerase chain reaction (PCR) and melt  curve analysis to screen for adenovirus; coronavirus HKU1, NL63, 229E,  OC43; human metapneumovirus (hMPV); human enterovirus/rhinovirus  (Entero/RV); influenza A; influenza A/H1;influenza A/H3; influenza  A/H1-2009; influenza B; parainfluenza viruses 1, 2, 3, 4; respiratory  syncytial virus; Bordetella pertussis; Mycoplasma pneumoniae; and  Chlamydophila pneumoniae.

## 2018-04-18 NOTE — PROGRESS NOTE ADULT - SUBJECTIVE AND OBJECTIVE BOX
Patient seen and examined at bedside  persistent cough  Case discussed with medical team    HPI:  89 yo M from home with PMhx HTN, HLD, MI X 2, CHF of unspecified EF, Prostate Cx (?w/ mets), who p/w several days difficulty breathing, sob, productive cough of white sputum, increasing abdominal distention, and pedal edema.  Pt recently stopped taking diuretics 2/2 adverse effects.  In ED, pt w/ s/s fluid overload, with increasing work of breathing, s/p lasix, vasotec, and put on BiPAP. 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; 1997      penicillin (Unknown)       MEDICATIONS  (STANDING):  ALBUTerol/ipratropium for Nebulization 3 milliLiter(s) Nebulizer every 6 hours  ATENolol  Tablet 12.5 milliGRAM(s) Oral daily  atorvastatin 20 milliGRAM(s) Oral at bedtime  buDESOnide   0.5 milliGRAM(s) Respule 0.5 milliGRAM(s) Inhalation every 12 hours  docusate sodium 100 milliGRAM(s) Oral two times a day  gabapentin 300 milliGRAM(s) Oral two times a day  heparin  Injectable 5000 Unit(s) SubCutaneous every 12 hours  multivitamin/minerals 1 Tablet(s) Oral daily  pantoprazole    Tablet 40 milliGRAM(s) Oral before breakfast  rivaroxaban 15 milliGRAM(s) Oral <User Schedule>  tamsulosin 0.8 milliGRAM(s) Oral daily  vancomycin  IVPB 1000 milliGRAM(s) IV Intermittent every 24 hours    MEDICATIONS  (PRN):  acetaminophen   Tablet. 650 milliGRAM(s) Oral every 6 hours PRN Mild Pain (1 - 3)  triazolam 0.25 milliGRAM(s) Oral at bedtime PRN Insomnia      REVIEW OF SYSTEMS:  CONSTITUTIONAL: (+) malaise.   EYES: No acute change in vision   ENT:  No tinnitus  NECK: No stiffness  RESPIRATORY: cough, no hemoptysis  CARDIOVASCULAR: No chest pain, palpitations, syncope  GASTROINTESTINAL: No hematemesis, diarrhea, melena, or hematochezia.  GENITOURINARY: No hematuria  NEUROLOGICAL: No headaches  LYMPH Nodes: No enlarged glands  ENDOCRINE: No heat or cold intolerance	    T(C): 36.9 (04-18-18 @ 04:30), Max: 36.9 (04-18-18 @ 04:30)  HR: 69 (04-18-18 @ 04:30) (67 - 80)  BP: 112/60 (04-18-18 @ 04:30) (108/61 - 119/69)  RR: 17 (04-18-18 @ 04:30) (17 - 18)  SpO2: 95% (04-18-18 @ 08:49) (95% - 99%)    PHYSICAL EXAMINATION:   Constitutional: WD, NAD  HEENT: NC, AT  Neck:  Supple  Respiratory:  audible cough, sob on exertion. dequate airflow b/l. Not using accessory muscles of respiration.  Cardiovascular:  S1 & S2 intact, no R/G, 2+ radial pulses b/l  Gastrointestinal: Soft, NT, distended., normoactive b.s., no organomegaly/RT/rigidity  Extremities: WWP  Neurological:  Alert and awake.  No acute focal motor deficits. Crude sensation intact.     Labs and imaging reviewed    LABS:                        10.0   4.42  )-----------( 180      ( 18 Apr 2018 07:28 )             32.6     04-18    138  |  102  |  19  ----------------------------<  108<H>  4.1   |  26  |  1.24    Ca    8.1<L>      18 Apr 2018 06:43              CAPILLARY BLOOD GLUCOSE                  RADIOLOGY & ADDITIONAL STUDIES:

## 2018-04-18 NOTE — PROGRESS NOTE ADULT - SUBJECTIVE AND OBJECTIVE BOX
[facility] NEPHROLOGY- PROGRESS NOTE    89 year old male with history of prostate CA presents with SOB due to parainfluenza and volume overload. Nephrology consulted for elevated Scr.    REVIEW OF SYSTEMS:  Gen: no changes in weight  Cards: no chest pain  Resp: + dyspnea improving  GI: no nausea or vomiting or diarrhea  Vascular: + LE edema improving    penicillin (Unknown)      Hospital Medications: Medications reviewed      VITALS:  T(F): 98.5 (04-18-18 @ 04:30), Max: 98.5 (04-18-18 @ 04:30)  HR: 69 (04-18-18 @ 04:30)  BP: 112/60 (04-18-18 @ 04:30)  RR: 17 (04-18-18 @ 04:30)  SpO2: 95% (04-18-18 @ 08:49)  Wt(kg): --    04-17 @ 07:01  -  04-18 @ 07:00  --------------------------------------------------------  IN: 540 mL / OUT: 0 mL / NET: 540 mL        PHYSICAL EXAM:    Gen: NAD, calm  Cards: RRR, +S1/S2, no M/G/R  Resp: course BS B/L  GI: soft, NT/ND, NABS  Vascular: trace LE edema B/L      LABS:  04-18    138  |  102  |  19  ----------------------------<  108<H>  4.1   |  26  |  1.24    Ca    8.1<L>      18 Apr 2018 06:43      Creatinine Trend: 1.24 <--, 1.42 <--, 1.59 <--, 1.48 <--, 1.42 <--                        10.0   4.42  )-----------( 180      ( 18 Apr 2018 07:28 )             32.6

## 2018-04-18 NOTE — PROGRESS NOTE ADULT - ATTENDING COMMENTS
[facility] NEPHROLOGY  Driss Bowling M.D.  Seven Polo D.O.  Darlene Kothari M.D.  Ritu Ibarra, MSN, ANP-C    Telephone: [phone]  Facsimile: [phone]    [address] [place] [address]
[facility] NEPHROLOGY  Driss Bowling M.D.  Seven Polo D.O.  Darlene Kothari M.D.  Ritu Ibarra, MSN, ANP-C    Telephone: [phone]  Facsimile: [phone]    [address] [place] [address]
[facility] NEPHROLOGY  Dirss Bowling M.D.  Seven Polo D.O.  Darlene Kothari M.D.  Ritu Ibarra, MSN, ANP-C    Telephone: [phone]  Facsimile: [phone]    [address] [place] [address]

## 2018-04-18 NOTE — PROGRESS NOTE ADULT - SUBJECTIVE AND OBJECTIVE BOX
Follow-up Pulm Progress Note    The patient was seen and examined. Notes reviewed and discussed with staff/team as applicable      No new respiratory events overnight.  Still congested. No distress    Denies: SOB, Chest pain, increased cough, colored phlegm, hemoptysis, N/V/D, neck stiffness, dysuria  ROS weak, some loss of balance    Vital Signs Last 24 Hrs  T(C): 36.9 (18 Apr 2018 04:30), Max: 36.9 (18 Apr 2018 04:30)  T(F): 98.5 (18 Apr 2018 04:30), Max: 98.5 (18 Apr 2018 04:30)  HR: 69 (18 Apr 2018 04:30) (67 - 80)  BP: 112/60 (18 Apr 2018 04:30) (108/61 - 119/69)  BP(mean): --  RR: 17 (18 Apr 2018 04:30) (17 - 18)  SpO2: 99% (18 Apr 2018 04:30) (96% - 99%)    04-17 @ 07:01  -  04-18 @ 07:00  --------------------------------------------------------  IN: 540 mL / OUT: 0 mL / NET: 540 mL          Medications:  MEDICATIONS  (STANDING):  ALBUTerol/ipratropium for Nebulization 3 milliLiter(s) Nebulizer every 6 hours  ATENolol  Tablet 12.5 milliGRAM(s) Oral daily  atorvastatin 20 milliGRAM(s) Oral at bedtime  buDESOnide   0.5 milliGRAM(s) Respule 0.5 milliGRAM(s) Inhalation every 12 hours  docusate sodium 100 milliGRAM(s) Oral two times a day  gabapentin 300 milliGRAM(s) Oral two times a day  heparin  Injectable 5000 Unit(s) SubCutaneous every 12 hours  multivitamin/minerals 1 Tablet(s) Oral daily  pantoprazole    Tablet 40 milliGRAM(s) Oral before breakfast  rivaroxaban 15 milliGRAM(s) Oral <User Schedule>  tamsulosin 0.8 milliGRAM(s) Oral daily  vancomycin  IVPB 1000 milliGRAM(s) IV Intermittent every 24 hours    MEDICATIONS  (PRN):  acetaminophen   Tablet. 650 milliGRAM(s) Oral every 6 hours PRN Mild Pain (1 - 3)  triazolam 0.25 milliGRAM(s) Oral at bedtime PRN Insomnia      Allergies    penicillin (Unknown)    Physical Examination:    Pleasant elderly white man, NAD on O2  Neck: no JVD, LAD, accessory muscle use  PULM: Decreased BS bases, few wheezes  CVS: Regular rate and rhythm, S1S2, no murmurs, rubs, or gallops  Abdomen: soft, NT  Extremities: improved, no LE edema  Neuro: non focal      LABS:                        10.4   5.86  )-----------( 193      ( 17 Apr 2018 07:39 )             33.4     04-18    138  |  102  |  19  ----------------------------<  108<H>  4.1   |  26  |  1.24    Ca    8.1<L>      18 Apr 2018 06:43            Serum Pro-Brain Natriuretic Peptide: 668 pg/mL (04-17-18 @ 07:02)

## 2018-04-18 NOTE — PROGRESS NOTE ADULT - ASSESSMENT
Parainfluenza infection complicated by CHF and bronchial hyperreactivity.  The patient had been hypoxic requiring bipap, but he has improved with diuresis and nebulizer rx, currently, he is tolerating NC O2 and feeling better.  He would like to ambulate more    Supplemental O2 to maintain saturation 92% or above watching for evidence of CO2 retention. Check RA sat in chair today  CV management per cardiology and nephrology. Echo pending  Continue duoneb Q6  Budesonide 0.5 mg via neb bid.  Elevating HOB.   Following CxR  CT official with bilateral effusions/fluid overload- likely contributing to dyspnea, cough   Standard GI/DVT prophylaxis.  increase ambulation-into hallways once able  incentive spirometry    Fausto Espinoza MD  Pulmonary Medicine  [phone]

## 2018-04-18 NOTE — PROGRESS NOTE ADULT - ASSESSMENT
Elderly with remote MI, stable CAD, no hx CHF, mild-mod AI and MR. Prostate CA rx with Lupron. Recent edema, atrial flutter. On anticoag with Xarelto 15 and rate control with beta-blocker. Issues with bradycardia prior. Now with influenza and bronchospasm, some CHF. Edema gone. Lasix held for increasing BUN and Creatinine and no more edema. Now BUN/Cr improving and no signs CHF. Still somepleural effusions. No ascites. Check echo.  Patient eager for discharge by Friday AM.  Check O2 on room air.

## 2018-04-18 NOTE — PROGRESS NOTE ADULT - SUBJECTIVE AND OBJECTIVE BOX
Patient seen and evaluated @ 845  Chief Complaint: Patient is a 89y old  Male who presents with a chief complaint of SOB, found to have influenza    HPI:  89 yo M from home with PMhx HTN, HLD, MI X 2, CHF of unspecified EF, Prostate Cx (?w/ mets), who p/w several days difficulty breathing, sob, productive cough of white sputum, increasing abdominal distention, and pedal edema.  Pt recently stopped taking diuretics 2/2 adverse effects.  In ED, pt w/ s/s fluid overload, with increasing work of breathing, s/p lasix, vasotec, and put on BiPAP. (2018 14:54)    Recently diagnosed and treated for prostate CA. Severe edema from lupron. Developed atrial flutter 2018. Treated with Aldactazide. Elevated BUN/Cr and K+ 5.7 so Losartan stopped and a;dactazide changed to MWF but daughter D/C'ed totally around 3-4 weeks ago.  Developed influenza with wheezing abd SOB so to ER on Sat .    PMH:   HLD (hyperlipidemia)  HTN (hypertension)  Cancer of prostate  MI, old    PSH:     Medications:   acetaminophen   Tablet. 650 milliGRAM(s) Oral every 6 hours PRN  ALBUTerol/ipratropium for Nebulization 3 milliLiter(s) Nebulizer every 6 hours  ATENolol  Tablet 12.5 milliGRAM(s) Oral daily  atorvastatin 20 milliGRAM(s) Oral at bedtime  buDESOnide   0.5 milliGRAM(s) Respule 0.5 milliGRAM(s) Inhalation every 12 hours  docusate sodium 100 milliGRAM(s) Oral two times a day  gabapentin 300 milliGRAM(s) Oral two times a day  heparin  Injectable 5000 Unit(s) SubCutaneous every 12 hours  multivitamin/minerals 1 Tablet(s) Oral daily  pantoprazole    Tablet 40 milliGRAM(s) Oral before breakfast  rivaroxaban 15 milliGRAM(s) Oral <User Schedule>  tamsulosin 0.8 milliGRAM(s) Oral daily  triazolam 0.25 milliGRAM(s) Oral at bedtime PRN  vancomycin  IVPB 1000 milliGRAM(s) IV Intermittent every 24 hours  Allergies:  penicillin   ACE inhibitors    FAMILY HISTORY:    Social History: .   Smoking: Remote  Alcohol: No	  Drugs: No    Physical Exam:  Vital Signs Last 24 Hrs  T(C): 36.9 (2018 04:30), Max: 36.9 (2018 04:30)  T(F): 98.5 (2018 04:30), Max: 98.5 (2018 04:30)  HR: 69 (2018 04:30) (67 - 80)  BP: 112/60 (2018 04:30) (108/61 - 119/69)  BP(mean): --  RR: 17 (2018 04:30) (17 - 18)  SpO2: 99% (2018 04:30) (96% - 99%)  Appearance: WD, WN, NAD  Eyes:  No scleral icterus. PERRL, EOMI  HENT: Normal oral mucosa.]NC/AT  Neck: No JVD at 90 degrees. No F waves Normal carotid upstrokes without bruits or murmurs.  Cardiovascular: Normal PMI. Normal S1, S2 physiologically split. No S3 or S4. No murmurs.  Respiratory: Loud ronchi No wheezes. No rales today.  Gastrointestinal: Soft.  Non-tender. No hepatosplenomegally.  Extremities: No clubbing. No edema today. Pulses 2+ bilaterally symmetric, ? pedal.  Musculoskeletal:  No joint deformity   Neurologic: Non-focal  Lymphatic:  No lymphadenopathy  Psychiatry: [+ ] AAOx3 [ +] Mood & affect appropriate  Skin: No rashes. No ecchymoses. No cyanosis    Cardiovascular Diagnostic Testing:  ECG:   < from: 12 Lead ECG (18 @ 11:19) > Atrial fib/flutter, MVR. No significant T wave changes  Echo: 3/1/2016 Mild-mod MR. Mild-mod AI. Akinetic inferior and inferoseptum. LVEF 55%. Normal RV size and function. No pericardial effusion.    Stress Testin. Fixed inferior defect. No ischemia. LVEF 62%.    Cath: . 30% LAD. Totally occluded RCA. filling via collaterals.    Interpretation of Telemetry: Atrial flutter with MVR.    Imaging: < from: CT Chest No Cont (04.15.18 @ 17:10) >  INTERPRETATION:  Moderate bilateral pleural effusions with adjacent   atelectasis.    Scattered indeterminate subcentimeter solid nodules for example of the   right lower lobe on series 3 image 100 and left lower lobe adjacent to   the fissure series 3 image 70.    Tracemildly complex pericardial effusion, increased since 2017.   Borderline dilatation of the midascending aorta to 4.0cm.  Aortic valve   calcification.  Correlate with echocardiography as clinically warrented.      < end of copied text >      Labs:                                 10.4   5.86  )-----------( 193      ( 2018 07:39 )             33.4     04-18    138  |  102  |  19  ----------------------------<  108<H>  4.1   |  26  |  1.24    Ca    8.1<L>      2018 06:43

## 2018-04-19 ENCOUNTER — TRANSCRIPTION ENCOUNTER (OUTPATIENT)
Age: 83
End: 2018-04-19

## 2018-04-19 VITALS — WEIGHT: 153.88 LBS

## 2018-04-19 LAB
ANION GAP SERPL CALC-SCNC: 11 MMOL/L — SIGNIFICANT CHANGE UP (ref 5–17)
BUN SERPL-MCNC: 16 MG/DL — SIGNIFICANT CHANGE UP (ref 7–23)
CALCIUM SERPL-MCNC: 8.9 MG/DL — SIGNIFICANT CHANGE UP (ref 8.4–10.5)
CHLORIDE SERPL-SCNC: 103 MMOL/L — SIGNIFICANT CHANGE UP (ref 96–108)
CO2 SERPL-SCNC: 25 MMOL/L — SIGNIFICANT CHANGE UP (ref 22–31)
CREAT SERPL-MCNC: 1.16 MG/DL — SIGNIFICANT CHANGE UP (ref 0.5–1.3)
GLUCOSE SERPL-MCNC: 118 MG/DL — HIGH (ref 70–99)
HCT VFR BLD CALC: 32.7 % — LOW (ref 39–50)
HGB BLD-MCNC: 10.7 G/DL — LOW (ref 13–17)
MCHC RBC-ENTMCNC: 30.2 PG — SIGNIFICANT CHANGE UP (ref 27–34)
MCHC RBC-ENTMCNC: 32.7 GM/DL — SIGNIFICANT CHANGE UP (ref 32–36)
MCV RBC AUTO: 92.4 FL — SIGNIFICANT CHANGE UP (ref 80–100)
PLATELET # BLD AUTO: 196 K/UL — SIGNIFICANT CHANGE UP (ref 150–400)
POTASSIUM SERPL-MCNC: 4.2 MMOL/L — SIGNIFICANT CHANGE UP (ref 3.5–5.3)
POTASSIUM SERPL-SCNC: 4.2 MMOL/L — SIGNIFICANT CHANGE UP (ref 3.5–5.3)
RBC # BLD: 3.54 M/UL — LOW (ref 4.2–5.8)
RBC # FLD: 14.1 % — SIGNIFICANT CHANGE UP (ref 10.3–14.5)
SODIUM SERPL-SCNC: 139 MMOL/L — SIGNIFICANT CHANGE UP (ref 135–145)
WBC # BLD: 4.76 K/UL — SIGNIFICANT CHANGE UP (ref 3.8–10.5)
WBC # FLD AUTO: 4.76 K/UL — SIGNIFICANT CHANGE UP (ref 3.8–10.5)

## 2018-04-19 PROCEDURE — 84550 ASSAY OF BLOOD/URIC ACID: CPT

## 2018-04-19 PROCEDURE — 83735 ASSAY OF MAGNESIUM: CPT

## 2018-04-19 PROCEDURE — 85652 RBC SED RATE AUTOMATED: CPT

## 2018-04-19 PROCEDURE — 87633 RESP VIRUS 12-25 TARGETS: CPT

## 2018-04-19 PROCEDURE — 85027 COMPLETE CBC AUTOMATED: CPT

## 2018-04-19 PROCEDURE — 82947 ASSAY GLUCOSE BLOOD QUANT: CPT

## 2018-04-19 PROCEDURE — 99232 SBSQ HOSP IP/OBS MODERATE 35: CPT

## 2018-04-19 PROCEDURE — 83036 HEMOGLOBIN GLYCOSYLATED A1C: CPT

## 2018-04-19 PROCEDURE — 71045 X-RAY EXAM CHEST 1 VIEW: CPT

## 2018-04-19 PROCEDURE — 85045 AUTOMATED RETICULOCYTE COUNT: CPT

## 2018-04-19 PROCEDURE — 84295 ASSAY OF SERUM SODIUM: CPT

## 2018-04-19 PROCEDURE — 82553 CREATINE MB FRACTION: CPT

## 2018-04-19 PROCEDURE — 84132 ASSAY OF SERUM POTASSIUM: CPT

## 2018-04-19 PROCEDURE — 87486 CHLMYD PNEUM DNA AMP PROBE: CPT

## 2018-04-19 PROCEDURE — 80061 LIPID PANEL: CPT

## 2018-04-19 PROCEDURE — 80053 COMPREHEN METABOLIC PANEL: CPT

## 2018-04-19 PROCEDURE — 76705 ECHO EXAM OF ABDOMEN: CPT

## 2018-04-19 PROCEDURE — 83550 IRON BINDING TEST: CPT

## 2018-04-19 PROCEDURE — 82607 VITAMIN B-12: CPT

## 2018-04-19 PROCEDURE — 71046 X-RAY EXAM CHEST 2 VIEWS: CPT

## 2018-04-19 PROCEDURE — 82977 ASSAY OF GGT: CPT

## 2018-04-19 PROCEDURE — 93005 ELECTROCARDIOGRAM TRACING: CPT

## 2018-04-19 PROCEDURE — 83880 ASSAY OF NATRIURETIC PEPTIDE: CPT

## 2018-04-19 PROCEDURE — 84100 ASSAY OF PHOSPHORUS: CPT

## 2018-04-19 PROCEDURE — 71250 CT THORAX DX C-: CPT

## 2018-04-19 PROCEDURE — 93306 TTE W/DOPPLER COMPLETE: CPT

## 2018-04-19 PROCEDURE — 84443 ASSAY THYROID STIM HORMONE: CPT

## 2018-04-19 PROCEDURE — 87798 DETECT AGENT NOS DNA AMP: CPT

## 2018-04-19 PROCEDURE — 82550 ASSAY OF CK (CPK): CPT

## 2018-04-19 PROCEDURE — 80048 BASIC METABOLIC PNL TOTAL CA: CPT

## 2018-04-19 PROCEDURE — 82728 ASSAY OF FERRITIN: CPT

## 2018-04-19 PROCEDURE — 80076 HEPATIC FUNCTION PANEL: CPT

## 2018-04-19 PROCEDURE — 81001 URINALYSIS AUTO W/SCOPE: CPT

## 2018-04-19 PROCEDURE — 83605 ASSAY OF LACTIC ACID: CPT

## 2018-04-19 PROCEDURE — 82435 ASSAY OF BLOOD CHLORIDE: CPT

## 2018-04-19 PROCEDURE — 96374 THER/PROPH/DIAG INJ IV PUSH: CPT

## 2018-04-19 PROCEDURE — 83615 LACTATE (LD) (LDH) ENZYME: CPT

## 2018-04-19 PROCEDURE — 94660 CPAP INITIATION&MGMT: CPT

## 2018-04-19 PROCEDURE — 94640 AIRWAY INHALATION TREATMENT: CPT

## 2018-04-19 PROCEDURE — 87581 M.PNEUMON DNA AMP PROBE: CPT

## 2018-04-19 PROCEDURE — 97162 PT EVAL MOD COMPLEX 30 MIN: CPT

## 2018-04-19 PROCEDURE — 99285 EMERGENCY DEPT VISIT HI MDM: CPT | Mod: 25

## 2018-04-19 PROCEDURE — 82746 ASSAY OF FOLIC ACID SERUM: CPT

## 2018-04-19 PROCEDURE — 85014 HEMATOCRIT: CPT

## 2018-04-19 PROCEDURE — 82330 ASSAY OF CALCIUM: CPT

## 2018-04-19 PROCEDURE — 82803 BLOOD GASES ANY COMBINATION: CPT

## 2018-04-19 PROCEDURE — 84484 ASSAY OF TROPONIN QUANT: CPT

## 2018-04-19 RX ORDER — AZTREONAM 2 G
1 VIAL (EA) INJECTION
Qty: 5 | Refills: 0
Start: 2018-04-19 | End: 2018-04-23

## 2018-04-19 RX ORDER — GABAPENTIN 400 MG/1
6 CAPSULE ORAL
Qty: 0 | Refills: 0 | COMMUNITY

## 2018-04-19 RX ORDER — GABAPENTIN 400 MG/1
1 CAPSULE ORAL
Qty: 60 | Refills: 0
Start: 2018-04-19 | End: 2018-05-18

## 2018-04-19 RX ORDER — MULTIVIT-MIN/FERROUS GLUCONATE 9 MG/15 ML
1 LIQUID (ML) ORAL
Qty: 30 | Refills: 0
Start: 2018-04-19 | End: 2018-05-18

## 2018-04-19 RX ORDER — GABAPENTIN 400 MG/1
2 CAPSULE ORAL
Qty: 0 | Refills: 0 | DISCHARGE
Start: 2018-04-19

## 2018-04-19 RX ORDER — ACETAMINOPHEN 500 MG
2 TABLET ORAL
Qty: 0 | Refills: 0 | DISCHARGE
Start: 2018-04-19

## 2018-04-19 RX ORDER — PANTOPRAZOLE SODIUM 20 MG/1
1 TABLET, DELAYED RELEASE ORAL
Qty: 30 | Refills: 0
Start: 2018-04-19 | End: 2018-05-18

## 2018-04-19 RX ADMIN — Medication 3 MILLILITER(S): at 00:06

## 2018-04-19 RX ADMIN — Medication 0.5 MILLIGRAM(S): at 06:13

## 2018-04-19 RX ADMIN — Medication 1 TABLET(S): at 11:33

## 2018-04-19 RX ADMIN — GABAPENTIN 300 MILLIGRAM(S): 400 CAPSULE ORAL at 06:13

## 2018-04-19 RX ADMIN — Medication 100 MILLIGRAM(S): at 06:13

## 2018-04-19 RX ADMIN — HEPARIN SODIUM 5000 UNIT(S): 5000 INJECTION INTRAVENOUS; SUBCUTANEOUS at 06:13

## 2018-04-19 RX ADMIN — Medication 650 MILLIGRAM(S): at 06:03

## 2018-04-19 RX ADMIN — Medication 3 MILLILITER(S): at 06:13

## 2018-04-19 RX ADMIN — ATENOLOL 12.5 MILLIGRAM(S): 25 TABLET ORAL at 06:13

## 2018-04-19 RX ADMIN — Medication 650 MILLIGRAM(S): at 05:02

## 2018-04-19 RX ADMIN — PANTOPRAZOLE SODIUM 40 MILLIGRAM(S): 20 TABLET, DELAYED RELEASE ORAL at 06:13

## 2018-04-19 RX ADMIN — TAMSULOSIN HYDROCHLORIDE 0.8 MILLIGRAM(S): 0.4 CAPSULE ORAL at 11:33

## 2018-04-19 RX ADMIN — Medication 3 MILLILITER(S): at 11:33

## 2018-04-19 NOTE — DISCHARGE NOTE ADULT - CARE PLAN
Principal Discharge DX:	Acute respiratory failure with hypoxia  Goal:	Resolved  Assessment and plan of treatment:	Continue with antibiotic  Secondary Diagnosis:	Acute on chronic diastolic congestive heart failure  Assessment and plan of treatment:	Weigh yourself daily.  If you gain 3lbs in 3 days, or 5lbs in a week call your Health Care Provider.  Do not eat or drink foods containing more than 2000mg of salt (sodium) in your diet every day.  Call your Health Care Provider if you have any swelling or increased swelling in your feet, ankles, and/or stomach.  Take all of your medication as directed.  If you become dizzy call your Health Care Provider.  Secondary Diagnosis:	Atrial flutter with controlled response  Assessment and plan of treatment:	Atrial fibrillation is the most common heart rhythm problem & has the risk of stroke & heart attack  It helps if you control your blood pressure, not drink more than 1-2 alcohol drinks per day, cut down on caffeine, getting treatment for over active thyroid gland, & getting exercise  Call your doctor if you feel your heart racing or beating unusually, chest tightness or pain, lightheaded, faint, shortness of breath especially with exercise  It is important to take your heart medication as prescribed  You may be on anticoagulation which is very important to take as directed - you may need blood work to monitor drug levels  Secondary Diagnosis:	CKD (chronic kidney disease), stage III  Assessment and plan of treatment:	Avoid taking (NSAIDs) - (ex: Ibuprofen, Advil, Celebrex, Naprosyn)  Avoid taking any nephrotoxic agents (can harm kidneys) - Intravenous contrast for diagnostic testing, combination cold medications.  Have all medications adjusted for your renal function by your Health Care Provider.  Blood pressure control is important.  Take all medication as prescribed.  Secondary Diagnosis:	Complicated UTI (urinary tract infection)  Assessment and plan of treatment:	HOME CARE INSTRUCTIONS  f you were prescribed antibiotics, take them exactly as your caregiver instructs you. Finish the medication even if you feel better after you have only taken some of the medication.  Drink enough water and fluids to keep your urine clear or pale yellow.  Avoid caffeine, tea, and carbonated beverages. They tend to irritate your bladder.  Empty your bladder often. Avoid holding urine for long periods of time.  Empty your bladder before and after sexual intercourse.  After a bowel movement, women should cleanse from front to back. Use each tissue only once.  SEEK MEDICAL CARE IF:  You have back pain.  You develop a fever.  Your symptoms do not begin to resolve within 3 days.  SEEK IMMEDIATE MEDICAL CARE IF:  You have severe back pain or lower abdominal pain.  You develop chills.  You have nausea or vomiting.  You have continued burning or discomfort with urination.  Secondary Diagnosis:	Coronary artery disease of native artery of native heart with stable angina pectoris  Assessment and plan of treatment:	Coronary artery disease is a condition where the arteries the supply the heart muscle get clogges with fatty deposits & puts you at risk for a heart attack  Call your doctor if you have any new pain, pressure, or discomfort in the center of your chest, pain, tingling or discomfort in arms, back, neck, jaw, or stomach, shortness of breath, nausea, vomiting, burping or heartburn, sweating, cold and clammy skin, racing or abnormal heartbeat for more than 10 minutes or if they keep coming & going.  Call 911 and do not tr to get to hospital by care  You can help yourself with lefestyle changes (quitting smoking if you smoke), eat lots of fruits & vegetables & low fat dairy products, not a lot of meat & fatty foods, walk or some form of physical activity most days of the week, lose weight if you are overweight  Take your cardiac medication as prescribed to lower cholesterol, to lower blood pressure, aspirin to prevent blood clots, and diabetes control  Make sure to keep appointments with doctor for cardiac follow up care  Secondary Diagnosis:	HLD (hyperlipidemia)  Assessment and plan of treatment:	!ildh        Continue with current regimen

## 2018-04-19 NOTE — PROGRESS NOTE ADULT - PROBLEM SELECTOR PROBLEM 4
Acute respiratory failure with hypoxia
Acute respiratory failure with hypoxia
Prostate cancer
Acute respiratory failure with hypoxia
Abdominal distention
Acute respiratory failure with hypoxia
Acute respiratory failure with hypoxia
Pleural effusion, bilateral
Prostate cancer
Prostate cancer

## 2018-04-19 NOTE — PROGRESS NOTE ADULT - SUBJECTIVE AND OBJECTIVE BOX
Patient seen and evaluated @ 845  Chief Complaint: Patient is a 89y old  Male who presents with a chief complaint of SOB, found to have influenza    HPI:  89 yo M from home with PMhx HTN, HLD, MI X 2, CHF of unspecified EF, Prostate Cx (?w/ mets), who p/w several days difficulty breathing, sob, productive cough of white sputum, increasing abdominal distention, and pedal edema.  Pt recently stopped taking diuretics 2/2 adverse effects.  In ED, pt w/ s/s fluid overload, with increasing work of breathing, s/p lasix, vasotec, and put on BiPAP. (2018 14:54)    Recently diagnosed and treated for prostate CA. Severe edema from lupron. Developed atrial flutter 2018. Treated with Aldactazide. Elevated BUN/Cr and K+ 5.7 so Losartan stopped and a;dactazide changed to MWF but daughter D/C'ed totally around 3-4 weeks ago.  Developed influenza with wheezing abd SOB so to ER on Sat .    PMH:   HLD (hyperlipidemia)  HTN (hypertension)  Cancer of prostate  MI, old    PSH:     Medications:   acetaminophen   Tablet. 650 milliGRAM(s) Oral every 6 hours PRN  ALBUTerol/ipratropium for Nebulization 3 milliLiter(s) Nebulizer every 6 hours  ATENolol  Tablet 12.5 milliGRAM(s) Oral daily  atorvastatin 20 milliGRAM(s) Oral at bedtime  docusate sodium 100 milliGRAM(s) Oral two times a day  gabapentin 300 milliGRAM(s) Oral two times a day  heparin  Injectable 5000 Unit(s) SubCutaneous every 12 hours  multivitamin/minerals 1 Tablet(s) Oral daily  pantoprazole    Tablet 40 milliGRAM(s) Oral before breakfast  rivaroxaban 15 milliGRAM(s) Oral <User Schedule>  tamsulosin 0.8 milliGRAM(s) Oral daily  triazolam 0.25 milliGRAM(s) Oral at bedtime PRN  vancomycin  IVPB 1000 milliGRAM(s) IV Intermittent every 24 hours    Allergies:  penicillin   ACE inhibitors    FAMILY HISTORY:    Social History: .   Smoking: Remote  Alcohol: No	  Drugs: No    Physical Exam:  Vital Signs Last 24 Hrs  T(C): 36.6 (2018 03:54), Max: 36.6 (2018 21:17)  T(F): 97.9 (2018 03:54), Max: 97.9 (2018 03:54)  HR: 69 (2018 03:54) (69 - 69)  BP: 136/60 (2018 03:54) (116/73 - 136/60)  BP(mean): --  RR: 17 (2018 03:54) (17 - 18)  SpO2: 95% (2018 03:54) (95% - 100%)  Appearance: WD, WN, NAD  Eyes:  No scleral icterus. PERRL, EOMI  HENT: Normal oral mucosa.]NC/AT  Neck: No JVD at 90 degrees. No F waves Normal carotid upstrokes without bruits or murmurs.  Cardiovascular: Normal PMI. Normal S1, S2 physiologically split. No S3 or S4. No murmurs.  Respiratory: Minimal ronchi No wheezes. No rales today.  Gastrointestinal: Soft.  Non-tender. No hepatosplenomegally.  Extremities: No clubbing. No edema today. Pulses 2+ bilaterally symmetric, ? pedal.  Musculoskeletal:  No joint deformity   Neurologic: Non-focal  Lymphatic:  No lymphadenopathy  Psychiatry: [+ ] AAOx3 [ +] Mood & affect appropriate  Skin: No rashes. No ecchymoses. No cyanosis    Cardiovascular Diagnostic Testing:  ECG:   < from: 12 Lead ECG (18 @ 11:19) > Atrial fib/flutter, MVR. No significant T wave changes  Echo: 3/1/2016 Mild-mod MR. Mild-mod AI. Akinetic inferior and inferoseptum. LVEF 55%. Normal RV size and function. No pericardial effusion.  < from: Transthoracic Echocardiogram (18 @ 09:04) >  atient name: Primo Starr  YOB: 1923   Age: 89 (M)   MR#: [id number]  Study Date: 2018  Location: [address] [place], [place] [id number]: Gemma Menjivar RDCS  Study quality: Technically difficult  Referring Physician: Sonny Padilla MD  Blood Pressure: 112/60 mmHg  Height: 173 cm  Weight: 75 kg  BSA: 1.9 m2  ------------------------------------------------------------------------  PROCEDURE: Transthoracic echocardiogram with 2-D, M-Mode  and complete spectral and color flow Doppler.  INDICATION: Acute respiratory failure with hypoxia (J96.01)  ------------------------------------------------------------------------  Dimensions:    Normal Values:  LA:     3.4    2.0 - 4.0 cm  Ao:     3.7    2.0 - 3.8 cm  SEPTUM: 1.1    0.6 - 1.2 cm  PWT:    1.0    0.6 - 1.1 cm  LVIDd:  3.7    3.0 - 5.6 cm  LVIDs:  2.8    1.8 - 4.0 cm  Derived variables:  LVMI: 64 g/m2  RWT: 0.54  EF (Visual Estimate): 60 %  Doppler Peak Velocity (m/sec): AoV=1.2  ------------------------------------------------------------------------  Observations:  Mitral Valve: Mitral annular calcification and  calcified/tethered mitral leaflets. Mild-moderate mitral  regurgitation.  Aortic Valve/Aorta: Calcified trileaflet aortic valve with  decreased opening. Peak transaortic valve gradient equals 6  mm Hg, mean transaortic valve gradient equals 4 mm Hg,  aortic valve velocity time integral equals 25 cm. Minimal  aortic regurgitation.  Mean gradient is equal to 1 mm Hg,  LVOT velocity time integral equals 13 cm.  Normal aortic root (Ao: 3.7 cm at the sinuses of Valsalva).  Left Atrium: Normal left atrium.  LA volume index = 19  < from: Transthoracic Echocardiogram (18 @ 09:04) >  cc/m2.  Left Ventricle: Mild segmental left ventricular systolic  dysfunction. The basal to mid inferior and inferolateral  walls are hypokinetic. Paradoxical septal motion is  consistent with a conduction abnormality. Normal left  ventricular internal dimensions and wall thicknesses.  Normal diastolic function  Right Heart: Normal right atrium. Normal right ventricular  size and function. Tricuspid valve not well visualized.  Mild tricuspid regurgitation. Pulmonic valve not well  visualized.  Pericardium/Pleura: Normal pericardium with no pericardial  effusion.  Hemodynamic: Estimated right atrial pressure is 8 mm Hg.  Estimated right ventricular systolic pressure equals 28 mm  Hg, assuming right atrial pressure equals 8 mm Hg,  consistent with normal pulmonary pressures.  ------------------------------------------------------------------------  Conclusions:  1. Normal leftventricular internal dimensions and wall  thicknesses.  2. Mild segmental left ventricular systolic dysfunction.  The basal to mid inferior and inferolateral walls are  hypokinetic. Paradoxical septal motion is consistent with a  conduction abnormality.  3. Normal diastolic function  4. Normal right ventricular size and function.  5. Estimated pulmonary artery systolic pressure equals 28  mm Hg, assuming right atrial pressure equals 8  mm Hg,  consistent with normal pulmonary pressures.  6. Normal pericardium with no pericardial effusion.  *** No previous Echo exam.  ------------------------------------------------------------------------  Confirmed on  2018 - 10:42:23 by Kenny Benson M.D.  ------------------------------------------------------------------------    < end of copied text >    < end of copied text >    Stress Testin. Fixed inferior defect. No ischemia. LVEF 62%.    Cath: . 30% LAD. Totally occluded RCA. filling via collaterals.    Interpretation of Telemetry: Atrial flutter with MVR.    Imaging: < from: CT Chest No Cont (04.15.18 @ 17:10) >  INTERPRETATION:  Moderate bilateral pleural effusions with adjacent   atelectasis.    Scattered indeterminate subcentimeter solid nodules for example of the   right lower lobe on series 3 image 100 and left lower lobe adjacent to   the fissure series 3 image 70.    Tracemildly complex pericardial effusion, increased since 2017.   Borderline dilatation of the midascending aorta to 4.0cm.  Aortic valve   calcification.  Correlate with echocardiography as clinically warrented.    < from: Xray Chest 2 Views PA/Lat (18 @ 14:32) >  INTERPRETATION:  bilateral pleural effusions greater on the left compared   to the right              ******PRELIMINARY REPORT******    ******PRELIMINARY REPORT******            < end of copied text >    < end of copied text >      Labs:                                                       10.7   4.76  )-----------( 196      ( 2018 07:33 )             32.7     -    139  |  103  |  16  ----------------------------<  118<H>  4.2   |  25  |  1.16    Ca    8.9      2018 05:56

## 2018-04-19 NOTE — DISCHARGE NOTE ADULT - MEDICATION SUMMARY - MEDICATIONS TO TAKE
I will START or STAY ON the medications listed below when I get home from the hospital:    acetaminophen 325 mg oral tablet  -- 2 tab(s) by mouth every 6 hours, As needed, Mild Pain (1 - 3)  -- Indication: For Pain    Flomax 0.4 mg oral capsule  -- 2 cap(s) by mouth once a day  -- Indication: For BPH    Xarelto 15 mg oral tablet  -- 1 tab(s) by mouth once a day (in the evening)  -- Indication: For Atrial flutter with controlled response    gabapentin 100 mg oral capsule  -- 6 cap(s) by mouth once a day  -- Indication: For Pain    atorvastatin 20 mg oral tablet  -- 1 tab(s) by mouth once a day  -- Indication: For Hyperlipidemia    triazolam 0.25 mg oral tablet  -- 1 tab(s) by mouth once a day (at bedtime), As needed, Insomnia MDD:1  -- Indication: For Anxiety    atenolol 25 mg oral tablet  -- 0.5 tab(s) by mouth once a day  -- Indication: For Cad    Colace 100 mg oral capsule  -- 2 cap(s) by mouth once a day, As Needed  -- Indication: For Constipation    pantoprazole 40 mg oral delayed release tablet  -- 1 tab(s) by mouth once a day (before a meal)  -- Indication: For GI    Bactrim  mg-160 mg oral tablet  -- 1 tab(s) by mouth once a day x 5 days  -- Indication: For uti    Multiple Vitamins with Minerals oral tablet  -- 1 tab(s) by mouth once a day  -- Indication: For supplement    Prosteon oral tablet  -- 2 tab(s) by mouth once a day  -- Indication: For supplement    Eye Health Formula oral capsule  -- 1 cap(s) by mouth once a day  -- Indication: For supplement I will START or STAY ON the medications listed below when I get home from the hospital:    acetaminophen 325 mg oral tablet  -- 2 tab(s) by mouth every 6 hours, As needed, Mild Pain (1 - 3)  -- Indication: For Pain    Flomax 0.4 mg oral capsule  -- 2 cap(s) by mouth once a day  -- Indication: For BPH    Xarelto 15 mg oral tablet  -- 1 tab(s) by mouth once a day (in the evening)  -- Indication: For Atrial flutter with controlled response    gabapentin 300 mg oral capsule  -- 1 cap(s) by mouth 2 times a day  -- Indication: For Pain    atorvastatin 20 mg oral tablet  -- 1 tab(s) by mouth once a day  -- Indication: For Hyperlipidemia    triazolam 0.25 mg oral tablet  -- 1 tab(s) by mouth once a day (at bedtime), As needed, Insomnia MDD:1  -- Indication: For Anxiety    atenolol 25 mg oral tablet  -- 0.5 tab(s) by mouth once a day  -- Indication: For Cad    Colace 100 mg oral capsule  -- 2 cap(s) by mouth once a day, As Needed  -- Indication: For Constipation    pantoprazole 40 mg oral delayed release tablet  -- 1 tab(s) by mouth once a day (before a meal)  -- Indication: For GI    Bactrim  mg-160 mg oral tablet  -- 1 tab(s) by mouth once a day x 5 days  -- Indication: For uti    Multiple Vitamins with Minerals oral tablet  -- 1 tab(s) by mouth once a day  -- Indication: For supplement    Prosteon oral tablet  -- 2 tab(s) by mouth once a day  -- Indication: For supplement    Eye Health Formula oral capsule  -- 1 cap(s) by mouth once a day  -- Indication: For supplement

## 2018-04-19 NOTE — PROGRESS NOTE ADULT - SUBJECTIVE AND OBJECTIVE BOX
Follow-up Pulm Progress Note    The patient was seen and examined. Notes reviewed and discussed with staff/team as applicable      No new respiratory events overnight. Mild cough, anxious to go home. XR reviewed     Denies: SOB, Chest pain, increased cough, colored phlegm, hemoptysis, N/V/D, neck stiffness, dysuria  ROS otherwise within normal limits. Pt able to walk    Vital Signs Last 24 Hrs  T(C): 36.6 (19 Apr 2018 03:54), Max: 36.6 (18 Apr 2018 21:17)  T(F): 97.9 (19 Apr 2018 03:54), Max: 97.9 (19 Apr 2018 03:54)  HR: 69 (19 Apr 2018 03:54) (69 - 69)  BP: 136/60 (19 Apr 2018 03:54) (116/73 - 136/60)  BP(mean): --  RR: 17 (19 Apr 2018 03:54) (17 - 18)  SpO2: 95% (19 Apr 2018 03:54) (95% - 100%)          04-18 @ 07:01  -  04-19 @ 07:00  --------------------------------------------------------  IN: 1020 mL / OUT: 400 mL / NET: 620 mL          Medications:  MEDICATIONS  (STANDING):  ALBUTerol/ipratropium for Nebulization 3 milliLiter(s) Nebulizer every 6 hours  ATENolol  Tablet 12.5 milliGRAM(s) Oral daily  atorvastatin 20 milliGRAM(s) Oral at bedtime  buDESOnide   0.5 milliGRAM(s) Respule 0.5 milliGRAM(s) Inhalation every 12 hours  docusate sodium 100 milliGRAM(s) Oral two times a day  gabapentin 300 milliGRAM(s) Oral two times a day  heparin  Injectable 5000 Unit(s) SubCutaneous every 12 hours  multivitamin/minerals 1 Tablet(s) Oral daily  pantoprazole    Tablet 40 milliGRAM(s) Oral before breakfast  rivaroxaban 15 milliGRAM(s) Oral <User Schedule>  tamsulosin 0.8 milliGRAM(s) Oral daily  vancomycin  IVPB 1000 milliGRAM(s) IV Intermittent every 24 hours    MEDICATIONS  (PRN):  acetaminophen   Tablet. 650 milliGRAM(s) Oral every 6 hours PRN Mild Pain (1 - 3)  triazolam 0.25 milliGRAM(s) Oral at bedtime PRN Insomnia      Allergies    penicillin (Unknown)      Physical Examination:    Pleasant white man, NAD  Neck: no JVD, LAD, accessory muscle use  PULM: Decreased BS bases, no wheezes  CVS: Regular rate and rhythm, S1S2, no murmurs, rubs, or gallops  Abdomen: soft, NT  Extremities: no edema  Neuro: non focal      LABS:                        10.0   4.42  )-----------( 180      ( 18 Apr 2018 07:28 )             32.6     04-19    139  |  103  |  16  ----------------------------<  118<H>  4.2   |  25  |  1.16    Ca    8.9      19 Apr 2018 05:56            CAPILLARY BLOOD GLUCOSE              Serum Pro-Brain Natriuretic Peptide: 668 pg/mL (04-17-18 @ 07:02)      RADIOLOGY REVIEWED    CXR:    CXR shows small bilateral effusions.

## 2018-04-19 NOTE — PROGRESS NOTE ADULT - PROBLEM SELECTOR PLAN 1
Patient with underlying CKD-3 without proteinuria with baseline Scr 1.3 as per labs from January 2018 as per PMD. Scr improving. Given advanced age, defer inpatient CKD work up. Prior CT A/P reviewed. Avoid nephrotoxins. Monitor electrolytes.
Moderate size, persistent, with atelectasis  f/u CXR  if persists then can benefit from thoracentesis
Stable. On beta-blocker, statin, anticoag.
(+) outpatient UCx - mrsa  c/w vanco  contact precautions
Patient with underlying CKD-3 without proteinuria with baseline Scr 1.3 as per labs from January 2018 as per PMD. Scr at baseline. Given advanced age, defer inpatient CKD work up. Prior CT A/P reviewed. Avoid nephrotoxins. Monitor electrolytes.
Patient with underlying CKD-3 without proteinuria with baseline Scr 1.3 as per labs from January 2018 as per PMD. Scr increasing likely due to ACE-I and IV diuretic therapy. Agree with holding ACE-I and decreasing lasix to daily given improving volume status. Given advanced age, defer inpatient CKD work up. Prior CT A/P reviewed. Avoid nephrotoxins. Monitor electrolytes.
Stable. On beta-blocker, statin, anticoag.
c/w iv diuresis  f/u TTE for specified type of CHF exacerbation   F/u CT chest    strict fluid restriction, daily weight, telemonitoring  cardiac enzymes (-)  nutrition and PT  f/u am labs and replace electrolytes prn
Stable. On beta-blocker, statin, anticoag.
Diastolic Dysfunction (CHFpEF)   decrease lasix to qd   ->f/u TTE for pericardial effusion noted on CT   strict fluid restriction, daily weight, telemonitoring  cardiac enzymes (-)  nutrition and PT  f/u am labs and replace electrolytes prn

## 2018-04-19 NOTE — PROGRESS NOTE ADULT - PROBLEM SELECTOR PLAN 6
f/u U/S Abdomen
(+) rvp  supportive care
On lupron.
On lupron.
supplement diet  nutrition recs provided
takes lupron and bisphosphonates  f/u outpt onc for additional management and tx
On lupron.

## 2018-04-19 NOTE — PROGRESS NOTE ADULT - PROBLEM SELECTOR PROBLEM 3
Acute on chronic diastolic congestive heart failure
Edema, lower extremity
Acute on chronic diastolic congestive heart failure
CHF exacerbation
Acute on chronic diastolic congestive heart failure
Edema, lower extremity
Edema, lower extremity
Metastatic cancer
Pericardial effusion
Pleural effusion, bilateral

## 2018-04-19 NOTE — DISCHARGE NOTE ADULT - SECONDARY DIAGNOSIS.
Coronary artery disease of native artery of native heart with stable angina pectoris HLD (hyperlipidemia) Acute on chronic diastolic congestive heart failure Atrial flutter with controlled response CKD (chronic kidney disease), stage III Complicated UTI (urinary tract infection)

## 2018-04-19 NOTE — PROGRESS NOTE ADULT - PROBLEM SELECTOR PROBLEM 6
Abdominal distention
Prostate cancer
Viral respiratory infection
Metastatic cancer
Moderate protein-calorie malnutrition
Prostate cancer
Prostate cancer

## 2018-04-19 NOTE — PROGRESS NOTE ADULT - ASSESSMENT
Elderly with remote MI, stable CAD, no hx CHF, mild-mod AI and MR. Prostate CA rx with Lupron. Recent edema, atrial flutter. On anticoag with Xarelto 15 and rate control with beta-blocker. Issues with bradycardia prior. Now with influenza and bronchospasm, some CHF. Edema gone. Lasix held for increasing BUN and Creatinine and no more edema. Now BUN/Cr improving and no signs CHF. Still some pleural effusions. No ascites.   Echo with normal LVEF, normal RV function, no change. No pericardial effusion and no mention of pleural effusion but seen on CXR.  Patient eager and probably ready for discharge..  O2 on room air satisfactory.  Okay to discharge on lasix 20 mg po qd.  Bactrim per ID.  F/U 2 weeks..

## 2018-04-19 NOTE — PROGRESS NOTE ADULT - PROBLEM SELECTOR PROBLEM 1
CKD (chronic kidney disease), stage III
Coronary artery disease of native artery of native heart with stable angina pectoris
Pleural effusion, bilateral
CHF exacerbation
CKD (chronic kidney disease), stage III
CKD (chronic kidney disease), stage III
Complicated UTI (urinary tract infection)
Coronary artery disease of native artery of native heart with stable angina pectoris
Coronary artery disease of native artery of native heart with stable angina pectoris
CHF exacerbation

## 2018-04-19 NOTE — PROGRESS NOTE ADULT - PROBLEM SELECTOR PLAN 2
BP controlled. Continue to monitor off ACE-I for now. Monitor BP.
Good rate control on low dose beta-blocker. On Xarelto. Fib/flutter on ECG now.
c/w abx, adjust to oral upon safe d/c  contact precautions
BP controlled. Can restart low dose ACE-I if needed by cardiology. Monitor BP.
BP controlled. Continue to monitor off ACE-I for now (last dose given this morning). Monitor BP.
Diastolic Dysfunction (CHFpEF)   lasix qd   ->f/u TTE for pericardial effusion noted on CT   strict fluid restriction, daily weight, telemonitoring  cardiac enzymes (-)  nutrition and PT  f/u am labs and replace electrolytes prn
Good rate control on low dose beta-blocker. On Xarelto. Fib/flutter on ECG now.
improved  tolerating supplemental oxygen
Godd rate control on low dose beta-blocker. On Xarelto
mild complex, noted on CT  ->F/u TTE  pt is hemodynamically stable  monitor clinical status closely

## 2018-04-19 NOTE — DISCHARGE NOTE ADULT - HOSPITAL COURSE
89 yo M from home with PMhx HTN, HLD, MI X 2, CHF of unspecified EF, Prostate Ca (?w/ mets), who p/w several days difficulty breathing, sob, productive cough of white sputum, increasing abdominal distention, and pedal edema.  Pt recently stopped taking diuretics 2/2 adverse effects.      Dx: Parainfluenza - Parainfluenza infection complicated by CHF and bronchial hyperreactivity.  Improved  Acute Diastolic HF - s/p IV Lasix, S/P BIPAP  MRSA UTI -  Was on Vancomycin to cover MRSA inpatient and  change to oral Bactrim on discharge X 5 more days  Acute on CKD 3 - ACE on hold, Lasix on hold sec to BRYANT  Afib - Good rate control on low dose beta-blocker. On Xarelto. Fib/flutter on ECG now.   Small pericardial Effusion - Overall improved, the CXR shows improved effusions which should resolve with gentle diuresis. I would not recommend thoracentesis at this time as his oxygenation is acceptable and effusions are non large on upright film.  Follow up with Cardiology within 1 week.

## 2018-04-19 NOTE — PROGRESS NOTE ADULT - PROBLEM SELECTOR PLAN 3
Improving with lasix discontinued on 4/16. Can restart as needed by cardiology. Monitor UO.
Diastolic Dysfunction (CHFpEF)   tte results appreciated   strict fluid restriction, daily weight, telemonitoring  cardiac enzymes (-)  nutrition and PT
Careful to not overdiurese. Check echo. Repeat BNP down to 600s.
Careful to not overdiurese. Has pleural effusions, no CHF.  Okay for lasix 20 po qd for 2 weeks.  Okay for discharge from cardiac point of view.
Improving for which lasix decreased to daily. Monitor UO.
Improving with lasix discontinued on 4/16. If no plans for thoracentesis for bilateral pleural effusions, can start low dose lasix 20 mg daily. Monitor UO.
mild complex, noted on CT  ->F/u TTE  pt is hemodynamically stable  monitor clinical status closely
takes lupron and bisphosphonates  f/u outpt onc for additional management and tx
Careful to not overdiurese. Check echo. repeat BNP
Moderate size, persistent, with atelectasis  diuresing adequately  may require thoracentesis if it doesn't improve

## 2018-04-19 NOTE — DISCHARGE NOTE ADULT - PLAN OF CARE
HOME CARE INSTRUCTIONS  f you were prescribed antibiotics, take them exactly as your caregiver instructs you. Finish the medication even if you feel better after you have only taken some of the medication.  Drink enough water and fluids to keep your urine clear or pale yellow.  Avoid caffeine, tea, and carbonated beverages. They tend to irritate your bladder.  Empty your bladder often. Avoid holding urine for long periods of time.  Empty your bladder before and after sexual intercourse.  After a bowel movement, women should cleanse from front to back. Use each tissue only once.  SEEK MEDICAL CARE IF:  You have back pain.  You develop a fever.  Your symptoms do not begin to resolve within 3 days.  SEEK IMMEDIATE MEDICAL CARE IF:  You have severe back pain or lower abdominal pain.  You develop chills.  You have nausea or vomiting.  You have continued burning or discomfort with urination. Coronary artery disease is a condition where the arteries the supply the heart muscle get clogges with fatty deposits & puts you at risk for a heart attack  Call your doctor if you have any new pain, pressure, or discomfort in the center of your chest, pain, tingling or discomfort in arms, back, neck, jaw, or stomach, shortness of breath, nausea, vomiting, burping or heartburn, sweating, cold and clammy skin, racing or abnormal heartbeat for more than 10 minutes or if they keep coming & going.  Call 911 and do not tr to get to hospital by care  You can help yourself with lefestyle changes (quitting smoking if you smoke), eat lots of fruits & vegetables & low fat dairy products, not a lot of meat & fatty foods, walk or some form of physical activity most days of the week, lose weight if you are overweight  Take your cardiac medication as prescribed to lower cholesterol, to lower blood pressure, aspirin to prevent blood clots, and diabetes control  Make sure to keep appointments with doctor for cardiac follow up care !ildh        Continue with current regimen Resolved Continue with antibiotic Weigh yourself daily.  If you gain 3lbs in 3 days, or 5lbs in a week call your Health Care Provider.  Do not eat or drink foods containing more than 2000mg of salt (sodium) in your diet every day.  Call your Health Care Provider if you have any swelling or increased swelling in your feet, ankles, and/or stomach.  Take all of your medication as directed.  If you become dizzy call your Health Care Provider. Atrial fibrillation is the most common heart rhythm problem & has the risk of stroke & heart attack  It helps if you control your blood pressure, not drink more than 1-2 alcohol drinks per day, cut down on caffeine, getting treatment for over active thyroid gland, & getting exercise  Call your doctor if you feel your heart racing or beating unusually, chest tightness or pain, lightheaded, faint, shortness of breath especially with exercise  It is important to take your heart medication as prescribed  You may be on anticoagulation which is very important to take as directed - you may need blood work to monitor drug levels Avoid taking (NSAIDs) - (ex: Ibuprofen, Advil, Celebrex, Naprosyn)  Avoid taking any nephrotoxic agents (can harm kidneys) - Intravenous contrast for diagnostic testing, combination cold medications.  Have all medications adjusted for your renal function by your Health Care Provider.  Blood pressure control is important.  Take all medication as prescribed.

## 2018-04-19 NOTE — PROGRESS NOTE ADULT - PROBLEM SELECTOR PLAN 4
improved  tolerating supplemental oxygen
Patient with microscopic hematuria however doubt secondary to glomerular etiology also with h/o nephrolithiasis and prostate CA. Outpatient urology follow up. Urine culture results as per ID.
improved
Improving. Per pulmonary.
Improving. Per pulmonary. Okay for discharge.
Moderate size, persistent, with atelectasis  diuresing adequately, maintaining adequate oxygenation  may require thoracentesis if it doesn't improve
Patient with microscopic hematuria however doubt secondary to glomerular etiology also with h/o nephrolithiasis and prostate CA. Outpatient urology follow up. Awaiting urine culture results to determine antibiotic sensitivity (patient had been on bactrim).
Patient with microscopic hematuria however doubt secondary to glomerular etiology also with h/o nephrolithiasis and prostate CA. Outpatient urology follow up. Urine culture results as per ID.
f/u U/S Abdomen
Improving. Per pulmonary.

## 2018-04-19 NOTE — DISCHARGE NOTE ADULT - CARE PROVIDER_API CALL
Pranay Salvador (MD), Cardiovascular Disease; Internal Medicine  [address] [address]  Phone: [phone]  Fax: [phone]

## 2018-04-19 NOTE — DISCHARGE NOTE ADULT - MEDICATION SUMMARY - MEDICATIONS TO CHANGE
I will SWITCH the dose or number of times a day I take the medications listed below when I get home from the hospital:    gabapentin 100 mg oral capsule  -- 6 cap(s) by mouth once a day

## 2018-04-19 NOTE — PROGRESS NOTE ADULT - PROBLEM SELECTOR PROBLEM 5
Metastatic cancer
Edema, lower extremity
Metastatic cancer
ARF (acute renal failure)
Acute respiratory failure with hypoxia
Edema, lower extremity
Edema, lower extremity

## 2018-04-19 NOTE — PROGRESS NOTE ADULT - PROBLEM SELECTOR PROBLEM 7
CKD (chronic kidney disease), stage III
Moderate protein-calorie malnutrition
CKD (chronic kidney disease), stage III
CKD (chronic kidney disease), stage III
Viral respiratory infection
Viral respiratory infection

## 2018-04-19 NOTE — PROGRESS NOTE ADULT - ASSESSMENT
Parainfluenza infection complicated by CHF and bronchial hyperreactivity.  Improved    RA O2 sat acceptable  CV management per cardiology and nephrology.   Continue duoneb Q6 only while in hospital  d/c budesonide  Elevating HOB.   Standard GI/DVT prophylaxis.  increase ambulation  incentive spirometry  Overall improved, the CXR shows improved effusions which should resolve with gentle diuresis. I would not recommend thoracentesis at this time as his oxygenation is acceptable and effusions are non large on upright film.  d/c planning    Fausto Espinoza MD  Pulmonary Medicine  [phone]

## 2018-04-19 NOTE — PROGRESS NOTE ADULT - PROBLEM SELECTOR PLAN 7
supplement diet  nutrition recs provided
(+) rvp  supportive care
(+) rvp  supportive care
Do not over diurese.

## 2018-04-19 NOTE — PROGRESS NOTE ADULT - PROBLEM SELECTOR PROBLEM 2
Atrial flutter with controlled response
Hypertensive kidney disease with chronic kidney disease, stage 1 through stage 4 or unspecified 
Atrial flutter with controlled response
Complicated UTI (urinary tract infection)
Acute respiratory failure with hypoxia
Atrial flutter with controlled response
CHF exacerbation
Hypertensive kidney disease with chronic kidney disease, stage 1 through stage 4 or unspecified 
Hypertensive kidney disease with chronic kidney disease, stage 1 through stage 4 or unspecified 
Pericardial effusion

## 2018-04-19 NOTE — DISCHARGE NOTE ADULT - PATIENT PORTAL LINK FT
You can access the [facility] Patient Portal, offered by [facility], by registering with the following website: [email or web address]

## 2018-04-19 NOTE — PROGRESS NOTE ADULT - PROBLEM SELECTOR PLAN 5
takes lupron and bisphosphonates  f/u outpt onc for additional management and tx
takes lupron and bisphosphonates  f/u outpt onc for additional management and tx
Resolved with diuresis. On lupron.
Resolved with diuresis. On lupron.
improved  tolerating supplemental oxygen
optimize cardiac condition  f/u u/a  avoid nephrotoxic rx   monitor renal function
Resolved with diuresis. On lupron.

## 2018-04-19 NOTE — PROGRESS NOTE ADULT - PROVIDER SPECIALTY LIST ADULT
Cardiology
Cardiology
Infectious Disease
Infectious Disease
Internal Medicine
Internal Medicine
Nephrology
Nephrology
Pulmonology
Cardiology
Nephrology
Internal Medicine
Internal Medicine

## 2018-05-03 ENCOUNTER — CLINICAL ADVICE (OUTPATIENT)
Age: 83
End: 2018-05-03

## 2018-05-07 ENCOUNTER — APPOINTMENT (OUTPATIENT)
Dept: CARDIOLOGY | Facility: CLINIC | Age: 83
End: 2018-05-07
Payer: MEDICARE

## 2018-05-07 VITALS
BODY MASS INDEX: 24.96 KG/M2 | SYSTOLIC BLOOD PRESSURE: 128 MMHG | TEMPERATURE: 97.9 F | OXYGEN SATURATION: 99 % | WEIGHT: 149.8 LBS | HEART RATE: 51 BPM | HEIGHT: 65 IN | DIASTOLIC BLOOD PRESSURE: 67 MMHG

## 2018-05-07 PROBLEM — C61 MALIGNANT NEOPLASM OF PROSTATE: Chronic | Status: ACTIVE | Noted: 2018-04-14

## 2018-05-07 PROBLEM — I10 ESSENTIAL (PRIMARY) HYPERTENSION: Chronic | Status: ACTIVE | Noted: 2018-04-14

## 2018-05-07 PROBLEM — I25.2 OLD MYOCARDIAL INFARCTION: Chronic | Status: ACTIVE | Noted: 2018-04-14

## 2018-05-07 PROBLEM — E78.5 HYPERLIPIDEMIA, UNSPECIFIED: Chronic | Status: ACTIVE | Noted: 2018-04-14

## 2018-05-07 PROCEDURE — 36415 COLL VENOUS BLD VENIPUNCTURE: CPT

## 2018-05-07 PROCEDURE — 99214 OFFICE O/P EST MOD 30 MIN: CPT

## 2018-05-07 RX ORDER — SPIRONOLACTONE AND HYDROCHLOROTHIAZIDE 25; 25 MG/1; MG/1
25-25 TABLET, FILM COATED ORAL EVERY OTHER DAY
Qty: 30 | Refills: 0 | Status: DISCONTINUED | COMMUNITY
Start: 2018-03-08 | End: 2018-05-07

## 2018-05-08 ENCOUNTER — MEDICATION RENEWAL (OUTPATIENT)
Age: 83
End: 2018-05-08

## 2018-05-08 LAB
ALBUMIN SERPL ELPH-MCNC: 3.8 G/DL
ALP BLD-CCNC: 71 U/L
ALT SERPL-CCNC: 11 U/L
ANION GAP SERPL CALC-SCNC: 19 MMOL/L
AST SERPL-CCNC: 20 U/L
BASOPHILS # BLD AUTO: 0.02 K/UL
BASOPHILS NFR BLD AUTO: 0.3 %
BILIRUB SERPL-MCNC: 0.8 MG/DL
BUN SERPL-MCNC: 29 MG/DL
CALCIUM SERPL-MCNC: 10.4 MG/DL
CHLORIDE SERPL-SCNC: 102 MMOL/L
CO2 SERPL-SCNC: 22 MMOL/L
CREAT SERPL-MCNC: 1.46 MG/DL
EOSINOPHIL # BLD AUTO: 0.11 K/UL
EOSINOPHIL NFR BLD AUTO: 1.8 %
GLUCOSE SERPL-MCNC: 100 MG/DL
HCT VFR BLD CALC: 38.7 %
HGB BLD-MCNC: 11.7 G/DL
IMM GRANULOCYTES NFR BLD AUTO: 0.3 %
LYMPHOCYTES # BLD AUTO: 1.53 K/UL
LYMPHOCYTES NFR BLD AUTO: 24.8 %
MAN DIFF?: NORMAL
MCHC RBC-ENTMCNC: 28.5 PG
MCHC RBC-ENTMCNC: 30.2 GM/DL
MCV RBC AUTO: 94.4 FL
MONOCYTES # BLD AUTO: 0.77 K/UL
MONOCYTES NFR BLD AUTO: 12.5 %
NEUTROPHILS # BLD AUTO: 3.73 K/UL
NEUTROPHILS NFR BLD AUTO: 60.3 %
NT-PROBNP SERPL-MCNC: 846 PG/ML
PLATELET # BLD AUTO: 172 K/UL
POTASSIUM SERPL-SCNC: 4.6 MMOL/L
PROT SERPL-MCNC: 6.9 G/DL
RBC # BLD: 4.1 M/UL
RBC # FLD: 15.1 %
SODIUM SERPL-SCNC: 143 MMOL/L
WBC # FLD AUTO: 6.18 K/UL

## 2018-05-15 ENCOUNTER — RX RENEWAL (OUTPATIENT)
Age: 83
End: 2018-05-15

## 2018-05-23 ENCOUNTER — MEDICATION RENEWAL (OUTPATIENT)
Age: 83
End: 2018-05-23

## 2018-05-27 ENCOUNTER — EMERGENCY (EMERGENCY)
Facility: HOSPITAL | Age: 83
LOS: 1 days | Discharge: HOME | End: 2018-05-27
Attending: EMERGENCY MEDICINE
Payer: MEDICARE

## 2018-05-27 VITALS
DIASTOLIC BLOOD PRESSURE: 68 MMHG | TEMPERATURE: 98 F | OXYGEN SATURATION: 98 % | HEIGHT: 68 IN | RESPIRATION RATE: 18 BRPM | HEART RATE: 71 BPM | SYSTOLIC BLOOD PRESSURE: 138 MMHG | WEIGHT: 154.98 LBS

## 2018-05-27 VITALS
RESPIRATION RATE: 18 BRPM | OXYGEN SATURATION: 99 % | HEART RATE: 67 BPM | TEMPERATURE: 98 F | DIASTOLIC BLOOD PRESSURE: 70 MMHG | SYSTOLIC BLOOD PRESSURE: 157 MMHG

## 2018-05-27 PROCEDURE — 99284 EMERGENCY DEPT VISIT MOD MDM: CPT | Mod: GC

## 2018-05-27 PROCEDURE — 99284 EMERGENCY DEPT VISIT MOD MDM: CPT

## 2018-05-27 PROCEDURE — 70450 CT HEAD/BRAIN W/O DYE: CPT

## 2018-05-27 PROCEDURE — 71250 CT THORAX DX C-: CPT

## 2018-05-27 PROCEDURE — 71250 CT THORAX DX C-: CPT | Mod: 26

## 2018-05-27 PROCEDURE — 70486 CT MAXILLOFACIAL W/O DYE: CPT

## 2018-05-27 PROCEDURE — 70486 CT MAXILLOFACIAL W/O DYE: CPT | Mod: 26

## 2018-05-27 PROCEDURE — 70450 CT HEAD/BRAIN W/O DYE: CPT | Mod: 26

## 2018-05-27 NOTE — ED PROVIDER NOTE - MEDICAL DECISION MAKING DETAILS
Attending MD Alberts: 89M with afib on Xarelto pw fall from standing with facial abrasions and left chest wall, left arm and flank ecchyomsis. Plan for CT head, CT torso to ro ICH, rib fxs or rib fxs, local wound care for abrasions

## 2018-05-27 NOTE — ED PROVIDER NOTE - PROGRESS NOTE DETAILS
Attending MD Alberts: CTs negative for acute traumatic injury. CT chest does reveal pleural effusion (known), no respiratory symptoms currently, ambulated with normal O2 sat. Patient and family alerted to this and need for outpatient follow up.

## 2018-05-27 NOTE — ED PROVIDER NOTE - OBJECTIVE STATEMENT
89 yo M from home with PMhx HTN, HLD, MI X 2, CHF of unspecified EF, Prostate Ca p/w facial injury s/p fall. 89 yo M from home with PMhx HTN, HLD, MI X 2, CHF of unspecified EF, Prostate Ca, on Xarelto p/w facial injury s/p fall.  Pt tripped and fell hitting his face.  No LOC.  Pt seen at urgent care and sent to ER. 89 yo M from home with PMhx HTN, HLD, MI X 2, CHF of unspecified EF, Prostate Ca, on Xarelto p/w facial injury s/p fall.  Pt tripped in his home due to grazing his leg on the sofa while walking and fell on the carpet hitting his face.  No LOC.  Pt seen at urgent care and sent to ER.  Pt ambulating without assistance.  Pt denies head, neck or back pain.  No chest pain, shortness of breath, abd pain, nausea/vomiting/diarrhea.    - Jillian Jaimes,

## 2018-05-27 NOTE — ED PROVIDER NOTE - ATTENDING CONTRIBUTION TO CARE
Attending MD Alberts:  I personally have seen and examined this patient.  Resident note reviewed and agree on plan of care and except where noted.  See HPI, PE, and MDM for details.       Attending MD Alberts: A & O x 3, GCS 15, NAD, HEENT WNL and no facial asymmetry;  no midline spinal TTP; lungs CTAB with no chest wall trauma or TTP, +ecchymosis to left lateral chest wall extending down to mid flank, ecchymosis down left medial upper arm and forearm, heart with reg rhythm without murmur; abdomen soft NTND with no R/G; extremities with no edema/deformities; skin with no rashes, neuro exam non focal with no motor or sensory deficits and patient is moving all extremities spontaneously.

## 2018-05-27 NOTE — ED PROVIDER NOTE - PHYSICAL EXAMINATION
Gen: NAD, AOx3, bandage covering nose  Head: NC, abrasion to forehead and abrasion with oozing blood to nose.   HEENT: PERRL, oral mucosa moist, normal conjunctiva, dried blood in bilateral nare, no septal hematoma  Lung: CTAB, no respiratory distress  CV: rrr, no murmurs, Normal perfusion. Diffuse ecchymosis to left lateral chest and left arm.  Abd: soft, NTND, no CVA tenderness  MSK: No edema, no visible deformities  Neuro: No focal neurologic deficits, normal gait  Skin: No rash.  Ecchymossi as described above.    Psych: normal affect   - Jillian Jaimes, DO

## 2018-05-27 NOTE — ED ADULT NURSE NOTE - OBJECTIVE STATEMENT
89 y/o male a+ox3, pmhx HLD, HTN, prostate CA, MI (old), coming from home for facial injury s/p fall. Pt accidentally tripped over furniture and fell onto floor; pt hit front of face; denies LOC. Pt on xarelto. Pt presents with open laceration to bridge of nose; minor bleeding noted; no external hemorrhage at this time. Assessment found bruising to inside of left upper arm, left axilla; no obvious trauma noted. Pt denies numbness or tingling x4, PMS intact x4. Pt denies headache, dizziness, lightheadedness, difficulty breathing, chest pain or discomfort, abdominal pain, n/v/d, fever or chills. Pt left in position of comfort, family at bedside, will reassess

## 2018-05-30 ENCOUNTER — RX RENEWAL (OUTPATIENT)
Age: 83
End: 2018-05-30

## 2018-06-05 ENCOUNTER — APPOINTMENT (OUTPATIENT)
Dept: CARDIOLOGY | Facility: CLINIC | Age: 83
End: 2018-06-05
Payer: MEDICARE

## 2018-06-05 VITALS
SYSTOLIC BLOOD PRESSURE: 126 MMHG | OXYGEN SATURATION: 97 % | TEMPERATURE: 98.3 F | BODY MASS INDEX: 27.82 KG/M2 | HEIGHT: 65 IN | WEIGHT: 167 LBS | HEART RATE: 63 BPM | DIASTOLIC BLOOD PRESSURE: 63 MMHG

## 2018-06-05 PROCEDURE — 99214 OFFICE O/P EST MOD 30 MIN: CPT

## 2018-06-05 PROCEDURE — 36415 COLL VENOUS BLD VENIPUNCTURE: CPT

## 2018-06-06 LAB
ALBUMIN SERPL ELPH-MCNC: 3.7 G/DL
ALP BLD-CCNC: 68 U/L
ALT SERPL-CCNC: 14 U/L
ANION GAP SERPL CALC-SCNC: 16 MMOL/L
AST SERPL-CCNC: 18 U/L
BASOPHILS # BLD AUTO: 0.01 K/UL
BASOPHILS NFR BLD AUTO: 0.2 %
BILIRUB SERPL-MCNC: 0.9 MG/DL
BUN SERPL-MCNC: 27 MG/DL
CALCIUM SERPL-MCNC: 9.7 MG/DL
CHLORIDE SERPL-SCNC: 104 MMOL/L
CHOLEST SERPL-MCNC: 142 MG/DL
CHOLEST/HDLC SERPL: 2.2 RATIO
CO2 SERPL-SCNC: 22 MMOL/L
CREAT SERPL-MCNC: 1.18 MG/DL
EOSINOPHIL # BLD AUTO: 0.09 K/UL
EOSINOPHIL NFR BLD AUTO: 1.4 %
GLUCOSE SERPL-MCNC: 79 MG/DL
HBA1C MFR BLD HPLC: 6 %
HCT VFR BLD CALC: 36.7 %
HDLC SERPL-MCNC: 64 MG/DL
HGB BLD-MCNC: 11 G/DL
IMM GRANULOCYTES NFR BLD AUTO: 0.2 %
LDLC SERPL CALC-MCNC: 38 MG/DL
LYMPHOCYTES # BLD AUTO: 1.33 K/UL
LYMPHOCYTES NFR BLD AUTO: 20.2 %
MAN DIFF?: NORMAL
MCHC RBC-ENTMCNC: 27.4 PG
MCHC RBC-ENTMCNC: 30 GM/DL
MCV RBC AUTO: 91.3 FL
MONOCYTES # BLD AUTO: 0.56 K/UL
MONOCYTES NFR BLD AUTO: 8.5 %
NEUTROPHILS # BLD AUTO: 4.6 K/UL
NEUTROPHILS NFR BLD AUTO: 69.5 %
NT-PROBNP SERPL-MCNC: 845 PG/ML
PLATELET # BLD AUTO: 200 K/UL
POTASSIUM SERPL-SCNC: 4.5 MMOL/L
PROT SERPL-MCNC: 6.6 G/DL
RBC # BLD: 4.02 M/UL
RBC # FLD: 15.3 %
SODIUM SERPL-SCNC: 142 MMOL/L
TRIGL SERPL-MCNC: 201 MG/DL
WBC # FLD AUTO: 6.6 K/UL

## 2018-07-25 ENCOUNTER — MEDICATION RENEWAL (OUTPATIENT)
Age: 83
End: 2018-07-25

## 2018-08-08 ENCOUNTER — NON-APPOINTMENT (OUTPATIENT)
Age: 83
End: 2018-08-08

## 2018-08-08 ENCOUNTER — APPOINTMENT (OUTPATIENT)
Dept: CARDIOLOGY | Facility: CLINIC | Age: 83
End: 2018-08-08
Payer: MEDICARE

## 2018-08-08 VITALS
BODY MASS INDEX: 25.66 KG/M2 | SYSTOLIC BLOOD PRESSURE: 126 MMHG | OXYGEN SATURATION: 99 % | WEIGHT: 154 LBS | HEIGHT: 65 IN | DIASTOLIC BLOOD PRESSURE: 69 MMHG | HEART RATE: 71 BPM | TEMPERATURE: 97.3 F

## 2018-08-08 PROCEDURE — 93000 ELECTROCARDIOGRAM COMPLETE: CPT

## 2018-08-08 PROCEDURE — 99214 OFFICE O/P EST MOD 30 MIN: CPT

## 2018-08-08 PROCEDURE — 36415 COLL VENOUS BLD VENIPUNCTURE: CPT

## 2018-08-10 LAB
ALBUMIN SERPL ELPH-MCNC: 4.1 G/DL
ALP BLD-CCNC: 76 U/L
ALT SERPL-CCNC: 14 U/L
ANION GAP SERPL CALC-SCNC: 12 MMOL/L
AST SERPL-CCNC: 20 U/L
BASOPHILS # BLD AUTO: 0.02 K/UL
BASOPHILS NFR BLD AUTO: 0.3 %
BILIRUB SERPL-MCNC: 1.2 MG/DL
BUN SERPL-MCNC: 23 MG/DL
CALCIUM SERPL-MCNC: 9.6 MG/DL
CHLORIDE SERPL-SCNC: 106 MMOL/L
CO2 SERPL-SCNC: 26 MMOL/L
CREAT SERPL-MCNC: 1.25 MG/DL
EOSINOPHIL # BLD AUTO: 0.17 K/UL
EOSINOPHIL NFR BLD AUTO: 2.3 %
GLUCOSE SERPL-MCNC: 102 MG/DL
HCT VFR BLD CALC: 39.7 %
HGB BLD-MCNC: 12.4 G/DL
IMM GRANULOCYTES NFR BLD AUTO: 0.4 %
LYMPHOCYTES # BLD AUTO: 1.56 K/UL
LYMPHOCYTES NFR BLD AUTO: 21.5 %
MAN DIFF?: NORMAL
MCHC RBC-ENTMCNC: 27.3 PG
MCHC RBC-ENTMCNC: 31.2 GM/DL
MCV RBC AUTO: 87.4 FL
MONOCYTES # BLD AUTO: 0.57 K/UL
MONOCYTES NFR BLD AUTO: 7.9 %
NEUTROPHILS # BLD AUTO: 4.91 K/UL
NEUTROPHILS NFR BLD AUTO: 67.6 %
NT-PROBNP SERPL-MCNC: 529 PG/ML
PLATELET # BLD AUTO: 160 K/UL
POTASSIUM SERPL-SCNC: 4.6 MMOL/L
PROT SERPL-MCNC: 6.3 G/DL
RBC # BLD: 4.54 M/UL
RBC # FLD: 15.3 %
SODIUM SERPL-SCNC: 144 MMOL/L
WBC # FLD AUTO: 7.26 K/UL

## 2018-08-13 ENCOUNTER — RX RENEWAL (OUTPATIENT)
Age: 83
End: 2018-08-13

## 2018-08-28 ENCOUNTER — RX RENEWAL (OUTPATIENT)
Age: 83
End: 2018-08-28

## 2018-10-01 ENCOUNTER — EMERGENCY (EMERGENCY)
Facility: HOSPITAL | Age: 83
LOS: 0 days | Discharge: HOME | End: 2018-10-01
Attending: EMERGENCY MEDICINE | Admitting: EMERGENCY MEDICINE

## 2018-10-01 VITALS
OXYGEN SATURATION: 99 % | DIASTOLIC BLOOD PRESSURE: 70 MMHG | HEART RATE: 69 BPM | RESPIRATION RATE: 18 BRPM | SYSTOLIC BLOOD PRESSURE: 157 MMHG

## 2018-10-01 VITALS
TEMPERATURE: 98 F | SYSTOLIC BLOOD PRESSURE: 139 MMHG | OXYGEN SATURATION: 100 % | RESPIRATION RATE: 18 BRPM | DIASTOLIC BLOOD PRESSURE: 65 MMHG | HEART RATE: 70 BPM

## 2018-10-01 DIAGNOSIS — E78.5 HYPERLIPIDEMIA, UNSPECIFIED: ICD-10-CM

## 2018-10-01 DIAGNOSIS — S02.2XXA FRACTURE OF NASAL BONES, INITIAL ENCOUNTER FOR CLOSED FRACTURE: ICD-10-CM
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
## 2018-10-01 DIAGNOSIS — E78.49 OTHER HYPERLIPIDEMIA: ICD-10-CM

## 2018-10-01 DIAGNOSIS — I10 ESSENTIAL (PRIMARY) HYPERTENSION: ICD-10-CM

## 2018-10-01 DIAGNOSIS — S01.511A LACERATION WITHOUT FOREIGN BODY OF LIP, INITIAL ENCOUNTER: ICD-10-CM

## 2018-10-01 DIAGNOSIS — I48.91 UNSPECIFIED ATRIAL FIBRILLATION: ICD-10-CM

## 2018-10-01 DIAGNOSIS — S09.90XA UNSPECIFIED INJURY OF HEAD, INITIAL ENCOUNTER: ICD-10-CM

## 2018-10-01 DIAGNOSIS — S01.81XA LACERATION WITHOUT FOREIGN BODY OF OTHER PART OF HEAD, INITIAL ENCOUNTER: ICD-10-CM
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
## 2018-10-01 DIAGNOSIS — Z23 ENCOUNTER FOR IMMUNIZATION: ICD-10-CM
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
## 2018-10-01 LAB
ALBUMIN SERPL ELPH-MCNC: 4 G/DL — SIGNIFICANT CHANGE UP (ref 3.5–5.2)
ALP SERPL-CCNC: 81 U/L — SIGNIFICANT CHANGE UP (ref 30–115)
ALT FLD-CCNC: 13 U/L — SIGNIFICANT CHANGE UP (ref 0–41)
ANION GAP SERPL CALC-SCNC: 10 MMOL/L — SIGNIFICANT CHANGE UP (ref 7–14)
APTT BLD: 38.7 SEC — SIGNIFICANT CHANGE UP (ref 27–39.2)
AST SERPL-CCNC: 19 U/L — SIGNIFICANT CHANGE UP (ref 0–41)
BASOPHILS # BLD AUTO: 0.03 K/UL — SIGNIFICANT CHANGE UP (ref 0–0.2)
BASOPHILS NFR BLD AUTO: 0.5 % — SIGNIFICANT CHANGE UP (ref 0–1)
BILIRUB SERPL-MCNC: 1 MG/DL — SIGNIFICANT CHANGE UP (ref 0.2–1.2)
BLD GP AB SCN SERPL QL: SIGNIFICANT CHANGE UP
BUN SERPL-MCNC: 30 MG/DL — HIGH (ref 10–20)
CALCIUM SERPL-MCNC: 9.3 MG/DL — SIGNIFICANT CHANGE UP (ref 8.5–10.1)
CHLORIDE SERPL-SCNC: 104 MMOL/L — SIGNIFICANT CHANGE UP (ref 98–110)
CO2 SERPL-SCNC: 27 MMOL/L — SIGNIFICANT CHANGE UP (ref 17–32)
CREAT SERPL-MCNC: 1.3 MG/DL — SIGNIFICANT CHANGE UP (ref 0.7–1.5)
EOSINOPHIL # BLD AUTO: 0.27 K/UL — SIGNIFICANT CHANGE UP (ref 0–0.7)
EOSINOPHIL NFR BLD AUTO: 4.1 % — SIGNIFICANT CHANGE UP (ref 0–8)
ETHANOL SERPL-MCNC: <10 MG/DL — HIGH
GLUCOSE SERPL-MCNC: 137 MG/DL — HIGH (ref 70–99)
HCT VFR BLD CALC: 36.1 % — LOW (ref 42–52)
HGB BLD-MCNC: 11.9 G/DL — LOW (ref 14–18)
IMM GRANULOCYTES NFR BLD AUTO: 0.3 % — SIGNIFICANT CHANGE UP (ref 0.1–0.3)
INR BLD: 1.46 RATIO — HIGH (ref 0.65–1.3)
LACTATE SERPL-SCNC: 1.7 MMOL/L — SIGNIFICANT CHANGE UP (ref 0.5–2.2)
LIDOCAIN IGE QN: 43 U/L — SIGNIFICANT CHANGE UP (ref 7–60)
LYMPHOCYTES # BLD AUTO: 1.26 K/UL — SIGNIFICANT CHANGE UP (ref 1.2–3.4)
LYMPHOCYTES # BLD AUTO: 19 % — LOW (ref 20.5–51.1)
MCHC RBC-ENTMCNC: 28.3 PG — SIGNIFICANT CHANGE UP (ref 27–31)
MCHC RBC-ENTMCNC: 33 G/DL — SIGNIFICANT CHANGE UP (ref 32–37)
MCV RBC AUTO: 86 FL — SIGNIFICANT CHANGE UP (ref 80–94)
MONOCYTES # BLD AUTO: 0.66 K/UL — HIGH (ref 0.1–0.6)
MONOCYTES NFR BLD AUTO: 10 % — HIGH (ref 1.7–9.3)
NEUTROPHILS # BLD AUTO: 4.38 K/UL — SIGNIFICANT CHANGE UP (ref 1.4–6.5)
NEUTROPHILS NFR BLD AUTO: 66.1 % — SIGNIFICANT CHANGE UP (ref 42.2–75.2)
NRBC # BLD: 0 /100 WBCS — SIGNIFICANT CHANGE UP (ref 0–0)
PLATELET # BLD AUTO: 119 K/UL — LOW (ref 130–400)
POTASSIUM SERPL-MCNC: 4.3 MMOL/L — SIGNIFICANT CHANGE UP (ref 3.5–5)
POTASSIUM SERPL-SCNC: 4.3 MMOL/L — SIGNIFICANT CHANGE UP (ref 3.5–5)
PROT SERPL-MCNC: 6.3 G/DL — SIGNIFICANT CHANGE UP (ref 6–8)
PROTHROM AB SERPL-ACNC: 15.7 SEC — HIGH (ref 9.95–12.87)
RBC # BLD: 4.2 M/UL — LOW (ref 4.7–6.1)
RBC # FLD: 14.3 % — SIGNIFICANT CHANGE UP (ref 11.5–14.5)
SODIUM SERPL-SCNC: 141 MMOL/L — SIGNIFICANT CHANGE UP (ref 135–146)
TROPONIN T SERPL-MCNC: <0.01 NG/ML — SIGNIFICANT CHANGE UP
TYPE + AB SCN PNL BLD: SIGNIFICANT CHANGE UP
WBC # BLD: 6.62 K/UL — SIGNIFICANT CHANGE UP (ref 4.8–10.8)
WBC # FLD AUTO: 6.62 K/UL — SIGNIFICANT CHANGE UP (ref 4.8–10.8)

## 2018-10-01 RX ORDER — TETANUS AND DIPHTHERIA TOXOIDS ADSORBED 2; 2 [LF]/.5ML; [LF]/.5ML
0.5 INJECTION INTRAMUSCULAR ONCE
Qty: 0 | Refills: 0 | Status: COMPLETED | OUTPATIENT
Start: 2018-10-01 | End: 2018-10-01

## 2018-10-01 RX ORDER — SODIUM CHLORIDE 9 MG/ML
1000 INJECTION INTRAMUSCULAR; INTRAVENOUS; SUBCUTANEOUS ONCE
Qty: 0 | Refills: 0 | Status: COMPLETED | OUTPATIENT
Start: 2018-10-01 | End: 2018-10-01

## 2018-10-01 RX ADMIN — TETANUS AND DIPHTHERIA TOXOIDS ADSORBED 0.5 MILLILITER(S): 2; 2 INJECTION INTRAMUSCULAR at 15:00

## 2018-10-01 RX ADMIN — SODIUM CHLORIDE 1000 MILLILITER(S): 9 INJECTION INTRAMUSCULAR; INTRAVENOUS; SUBCUTANEOUS at 14:50

## 2018-10-01 NOTE — CONSULT NOTE ADULT - SUBJECTIVE AND OBJECTIVE BOX
89y m    TRAUMA ACTIVATION LEVEL:  Trauma Alert    MECHANISM OF INJURY:      [] Blunt  	[] MVC	[X] Fall	[] Pedestrian Struck	[] Motorcycle   [] Assault   [] Bicycle collision  [] Sports injury     [] Penetrating  	[] Gun Shot Wound 		[] Stab Wound    GCS: 15	E: 4	V: 5	M: 6    89y old m s/p mechanical fall (-LOC, -HT, +AC).  HPI: 89 year old male presents to ED s/p mechanical fall while sitting in his rocking chair. PMH significant for Atrial fibrillation (on Xarelto), HTN, BPH, dementia. Patient was wearing reading glasses at the time from his fall, which caused 2 divets in his face. He presents with a laceration on his foreheard and bridge of his nose where his glasses sit. According to family, patient has frequent falls and slumps when he is sitting in his chair. He does not have any active complaints or pain anywhere else in his body.     PAST MEDICAL & SURGICAL HISTORY:  BPH, atrial fibrillation, HTN, dementia    Allergies  No Known Allergies    Home Medications:    Tamsulosin HCL 0.4 mg (Flomax) daily   Atenolol 25mg daily  Quetiapine fumarate 2 doses at dinner time  Atorvastatin 20mg daily  Triazalon at bedtime  Gabapentin capsules  Xarelto    ROS: 10-system review is otherwise negative except HPI above.      Primary Survey:    A - airway intact  B - bilateral breath sounds and good chest rise  C - palpable pulses in all extremities  D - GCS 15 on arrival, ROMEO  Exposure obtained    Vital Signs Last 24 Hrs  T(C): --  T(F): --  HR: 69 (01 Oct 2018 14:06) (69 - 69)  BP: 157/70 (01 Oct 2018 14:06) (157/70 - 157/70)  BP(mean): --  RR: 18 (01 Oct 2018 14:06) (18 - 18)  SpO2: 99% (01 Oct 2018 14:06) (99% - 99%)    Secondary Survey:   General: NAD  HEENT: Normocephalic, atraumatic, EOMI, PEERLA. no scalp lacerations   Neck: Soft, midline trachea. no cspine tenderness  Chest: No chest wall tenderness. or subq  emphysema   Cardiac: S1, S2, RRR  Respiratory: Bilateral breath sounds, clear and equal bilaterally  Abdomen: Soft, non-distended, non-tender, no rebound,   Groin: Normal appearing, pelvis stable   Ext: palp radial b/l UE, b/l DP palp in Lower Extrem.   Back: no TTP, no palpable runoff/stepoff/deformity  Rectal: No keshawn blood, MITZI with good tone    FAST negative    Procedures:    LABS:  Labs:  CAPILLARY BLOOD GLUCOSE      POCT Blood Glucose.: 135 mg/dL (01 Oct 2018 14:18)                          11.9   6.62  )-----------( 119      ( 01 Oct 2018 14:20 )             36.1       Auto Neutrophil %: 66.1 % (10-01-18 @ 14:20)  Auto Immature Granulocyte %: 0.3 % (10-01-18 @ 14:20)    ---------------------------------------------------------------------------------------    ASSESSMENT:  89y old m s/p mechanical fall    PLAN:    -F/u Pan scan  -F/u labs 89y m    TRAUMA ACTIVATION LEVEL:  Trauma Alert    MECHANISM OF INJURY:      [] Blunt  	[] MVC	[X] Fall	[] Pedestrian Struck	[] Motorcycle   [] Assault   [] Bicycle collision  [] Sports injury     [] Penetrating  	[] Gun Shot Wound 		[] Stab Wound    GCS: 15	E: 4	V: 5	M: 6    89y old m s/p mechanical fall (-LOC, +HT, +AC).  HPI: 89 year old male presents to ED s/p mechanical fall while sitting in his rocking chair. PMH significant for Atrial fibrillation (on Xarelto), HTN, BPH, dementia. Patient was wearing reading glasses at the time from his fall, which caused 2 divets in his face. He presents with a laceration on his foreheard and bridge of his nose where his glasses sit. According to family, patient has frequent falls and slumps when he is sitting in his chair. He does not have any active complaints or pain anywhere else in his body.     PAST MEDICAL & SURGICAL HISTORY:  BPH, atrial fibrillation, HTN, dementia    Allergies  No Known Allergies    Home Medications:    Tamsulosin HCL 0.4 mg (Flomax) daily   Atenolol 25mg daily  Quetiapine fumarate 2 doses at dinner time  Atorvastatin 20mg daily  Triazalon at bedtime  Gabapentin capsules  Xarelto    ROS: 10-system review is otherwise negative except HPI above.      Primary Survey:    A - airway intact  B - bilateral breath sounds and good chest rise  C - palpable pulses in all extremities  D - GCS 15 on arrival, ROMEO  Exposure obtained    Vital Signs Last 24 Hrs  T(C): --  T(F): --  HR: 69 (01 Oct 2018 14:06) (69 - 69)  BP: 157/70 (01 Oct 2018 14:06) (157/70 - 157/70)  BP(mean): --  RR: 18 (01 Oct 2018 14:06) (18 - 18)  SpO2: 99% (01 Oct 2018 14:06) (99% - 99%)    Secondary Survey:   General: NAD  HEENT: Normocephalic, atraumatic, EOMI, PEERLA. no scalp lacerations   Neck: Soft, midline trachea. no cspine tenderness  Chest: No chest wall tenderness. or subq  emphysema   Cardiac: S1, S2, RRR  Respiratory: Bilateral breath sounds, clear and equal bilaterally  Abdomen: Soft, non-distended, non-tender, no rebound,   Groin: Normal appearing, pelvis stable   Ext: palp radial b/l UE, b/l DP palp in Lower Extrem.   Back: no TTP, no palpable runoff/stepoff/deformity  Rectal: No keshawn blood, MITZI with good tone    FAST negative    Procedures:    LABS:  Labs:  CAPILLARY BLOOD GLUCOSE      POCT Blood Glucose.: 135 mg/dL (01 Oct 2018 14:18)                          11.9   6.62  )-----------( 119      ( 01 Oct 2018 14:20 )             36.1       Auto Neutrophil %: 66.1 % (10-01-18 @ 14:20)  Auto Immature Granulocyte %: 0.3 % (10-01-18 @ 14:20)    --------------------------------------------------------------------------------------- 89y m    TRAUMA ACTIVATION LEVEL:  Trauma Alert    MECHANISM OF INJURY:      [] Blunt  	[] MVC	[X] Fall	[] Pedestrian Struck	[] Motorcycle   [] Assault   [] Bicycle collision  [] Sports injury     [] Penetrating  	[] Gun Shot Wound 		[] Stab Wound    GCS: 15	E: 4	V: 5	M: 6    89y old m s/p mechanical fall (-LOC, +HT, +AC).  HPI: 89 year old male presents to ED s/p mechanical fall while sitting in his rocking chair. PMH significant for Atrial fibrillation (on Xarelto), HTN, BPH, dementia. Patient was wearing reading glasses at the time from his fall, which caused 2 divets in his face. He presents with a laceration on his foreheard and bridge of his nose where his glasses sit. According to family, patient has frequent falls and slumps when he is sitting in his chair. He does not have any active complaints or pain anywhere else in his body.     PAST MEDICAL & SURGICAL HISTORY:  BPH, atrial fibrillation, HTN, dementia    Allergies  No Known Allergies    Home Medications:    Tamsulosin HCL 0.4 mg (Flomax) daily   Atenolol 25mg daily  Quetiapine fumarate 2 doses at dinner time  Atorvastatin 20mg daily  Triazalon at bedtime  Gabapentin capsules  Xarelto    ROS: 10-system review is otherwise negative except HPI above.      Primary Survey:    A - airway intact  B - bilateral breath sounds and good chest rise  C - palpable pulses in all extremities  D - GCS 15 on arrival, ROMEO  Exposure obtained    Vital Signs Last 24 Hrs  T(C): --  T(F): --  HR: 69 (01 Oct 2018 14:06) (69 - 69)  BP: 157/70 (01 Oct 2018 14:06) (157/70 - 157/70)  BP(mean): --  RR: 18 (01 Oct 2018 14:06) (18 - 18)  SpO2: 99% (01 Oct 2018 14:06) (99% - 99%)    Secondary Survey:   General: NAD  HEENT: Normocephalic, atraumatic, EOMI, PEERLA. no scalp lacerations   Neck: Soft, midline trachea. no cspine tenderness  Chest: No chest wall tenderness. or subq  emphysema   Cardiac: S1, S2, RRR  Respiratory: Bilateral breath sounds, clear and equal bilaterally  Abdomen: Soft, non-distended, non-tender, no rebound,   Groin: Normal appearing, pelvis stable   Ext: palp radial b/l UE, b/l DP palp in Lower Extrem.   Back: no TTP, no palpable runoff/stepoff/deformity  Rectal: No keshawn blood, MITZI with good tone    FAST negative    Procedures:    LABS:  Labs:  CAPILLARY BLOOD GLUCOSE      POCT Blood Glucose.: 135 mg/dL (01 Oct 2018 14:18)                          11.9   6.62  )-----------( 119      ( 01 Oct 2018 14:20 )             36.1       Auto Neutrophil %: 66.1 % (10-01-18 @ 14:20)  Auto Immature Granulocyte %: 0.3 % (10-01-18 @ 14:20)    ---------------------------------------------------------------------------------------    < from: CT Chest w/ IV Cont (10.01.18 @ 17:24) >    IMPRESSION:     No evidence of acute intrathoracic, or intra-abdominal/pelvic traumatic   changes.    Stable solid pulmonary nodules measuring up to 4 mm, as above.    Stable main pancreatic ductal dilatation.    Unchanged 1 cm solid exophytic left renal upper pole lesion November 6, 2017, suspicious for neoplasm.  Consider MRI abdomen with intravenous   contrast renal protocol for further evaluation.    Increased prostate gland size since November 6, 2017.    Blastic osseous metastasis, right iliac wing, unchanged, consistent with   given history of metastatic prostate cancer.      < end of copied text >    < from: CT Maxillofacial No Cont (10.01.18 @ 17:10) >  IMPRESSION:    1.  Mildly displaced comminuted fractures at the tips of the nasal bones.    2.  Soft tissue swelling over the nose with tiny superficial lacerations.    3.  Left frontal extracalvarial soft tissue swelling with superficial   laceration.    < end of copied text >    < from: CT Head No Cont (10.01.18 @ 17:10) >  IMPRESSION:     1.  No evidence of acute intracranial pathology.      2.  Mild chronic microvascular changes.    3.  Left frontal extracalvarial soft tissue swelling with superficial   laceration.    < end of copied text >  < from: CT Cervical Spine No Cont (10.01.18 @ 17:10) >  IMPRESSION:    1.  No evidence of acute cervical spine fracture or subluxation.    2.  Multilevel degenerative changes with varying degrees of spinal canal   and neural foraminal stenosis, most pronounced at C5-6.    3.  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      < end of copied text >  < from: Xray Chest 1 View AP/PA (10.01.18 @ 14:54) >    Impression:      No radiographic evidence of acute cardiopulmonary disease.    < end of copied text >  < from: Xray Pelvis AP only (10.01.18 @ 14:53) >  INTERPRETATION:  Clinical History / Reason for exam: Trauma    Single frontal view.    Findings/  impression: No acute fracture or dislocation. Bilateral hip and lower   lumbar spine degenerative changes. Pelvic and femoral vascular   calcifications.    < end of copied text > 89y m    TRAUMA ACTIVATION LEVEL:  Trauma Alert    MECHANISM OF INJURY:      [] Blunt  	[] MVC	[X] Fall	[] Pedestrian Struck	[] Motorcycle   [] Assault   [] Bicycle collision  [] Sports injury     [] Penetrating  	[] Gun Shot Wound 		[] Stab Wound    GCS: 15	E: 4	V: 5	M: 6    89y old m s/p mechanical fall (-LOC, +HT, +AC).  HPI: 89 year old male presents to ED s/p mechanical fall while sitting in his rocking chair. PMH significant for Atrial fibrillation (on Xarelto), HTN, BPH, dementia. Patient was wearing reading glasses at the time from his fall, which caused 2 divets in his face. He presents with a laceration on his foreheard and bridge of his nose where his glasses sit. According to family, patient has frequent falls and slumps when he is sitting in his chair. He does not have any active complaints or pain anywhere else in his body.     PAST MEDICAL & SURGICAL HISTORY:  BPH, atrial fibrillation, HTN, dementia    Allergies  No Known Allergies    Home Medications:    Tamsulosin HCL 0.4 mg (Flomax) daily   Atenolol 25mg daily  Quetiapine fumarate 2 doses at dinner time  Atorvastatin 20mg daily  Triazalon at bedtime  Gabapentin capsules  Xarelto    ROS: 10-system review is otherwise negative except HPI above.      Primary Survey:    A - airway intact  B - bilateral breath sounds and good chest rise  C - palpable pulses in all extremities  D - GCS 15 on arrival, ROMEO  Exposure obtained    Vital Signs Last 24 Hrs  T(C): --  T(F): --  HR: 69 (01 Oct 2018 14:06) (69 - 69)  BP: 157/70 (01 Oct 2018 14:06) (157/70 - 157/70)  BP(mean): --  RR: 18 (01 Oct 2018 14:06) (18 - 18)  SpO2: 99% (01 Oct 2018 14:06) (99% - 99%)    Secondary Survey:   General: NAD  HEENT: Normocephalic, atraumatic, EOMI, PEERLA. no scalp lacerations   Neck: Soft, midline trachea. no cspine tenderness  Chest: No chest wall tenderness. or subq  emphysema   Cardiac: S1, S2, RRR  Respiratory: Bilateral breath sounds, clear and equal bilaterally  Abdomen: Soft, non-distended, non-tender, no rebound,   Groin: Normal appearing, pelvis stable   Ext: palp radial b/l UE, b/l DP palp in Lower Extrem.   Back: no TTP, no palpable runoff/stepoff/deformity  Rectal: No keshawn blood, MITZI with good tone    FAST negative    Procedures:    LABS:  Labs:  CAPILLARY BLOOD GLUCOSE      POCT Blood Glucose.: 135 mg/dL (01 Oct 2018 14:18)                          11.9   6.62  )-----------( 119      ( 01 Oct 2018 14:20 )             36.1       Auto Immature Granulocyte %: 0.3 % (10-01-18 @ 14:20)  Auto Neutrophil %: 66.1 % (10-01-18 @ 14:20)    10-01    141  |  104  |  30<H>  ----------------------------<  137<H>  4.3   |  27  |  1.3      Calcium, Total Serum: 9.3 mg/dL (10-01-18 @ 14:21)      LFTs:             6.3  | 1.0  | 19       ------------------[81      ( 01 Oct 2018 14:21 )  4.0  | x    | 13          Lipase:43     Amylase:x         Lactate, Blood: 1.7 mmol/L (10-01-18 @ 14:20)      Coags:     15.70  ----< 1.46    ( 01 Oct 2018 14:20 )     38.7        CARDIAC MARKERS ( 01 Oct 2018 14:20 )  x     / <0.01 ng/mL / x     / x     / x                < from: CT Chest w/ IV Cont (10.01.18 @ 17:24) >    IMPRESSION:     No evidence of acute intrathoracic, or intra-abdominal/pelvic traumatic   changes.    Stable solid pulmonary nodules measuring up to 4 mm, as above.    Stable main pancreatic ductal dilatation.    Unchanged 1 cm solid exophytic left renal upper pole lesion November 6, 2017, suspicious for neoplasm.  Consider MRI abdomen with intravenous   contrast renal protocol for further evaluation.    Increased prostate gland size since November 6, 2017.    Blastic osseous metastasis, right iliac wing, unchanged, consistent with   given history of metastatic prostate cancer.      < end of copied text >    < from: CT Maxillofacial No Cont (10.01.18 @ 17:10) >  IMPRESSION:    1.  Mildly displaced comminuted fractures at the tips of the nasal bones.    2.  Soft tissue swelling over the nose with tiny superficial lacerations.    3.  Left frontal extracalvarial soft tissue swelling with superficial   laceration.    < end of copied text >    < from: CT Head No Cont (10.01.18 @ 17:10) >  IMPRESSION:     1.  No evidence of acute intracranial pathology.      2.  Mild chronic microvascular changes.    3.  Left frontal extracalvarial soft tissue swelling with superficial   laceration.    < end of copied text >  < from: CT Cervical Spine No Cont (10.01.18 @ 17:10) >  IMPRESSION:    1.  No evidence of acute cervical spine fracture or subluxation.    2.  Multilevel degenerative changes with varying degrees of spinal canal   and neural foraminal stenosis, most pronounced at C5-6.    3.  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      < end of copied text >  < from: Xray Chest 1 View AP/PA (10.01.18 @ 14:54) >    Impression:      No radiographic evidence of acute cardiopulmonary disease.    < end of copied text >  < from: Xray Pelvis AP only (10.01.18 @ 14:53) >  INTERPRETATION:  Clinical History / Reason for exam: Trauma    Single frontal view.    Findings/  impression: No acute fracture or dislocation. Bilateral hip and lower   lumbar spine degenerative changes. Pelvic and femoral vascular   calcifications.    < end of copied text >

## 2018-10-01 NOTE — ED PROVIDER NOTE - CARE PLAN
Principal Discharge DX:	Nasal bone fracture  Secondary Diagnosis:	Abrasions of multiple sites  Secondary Diagnosis:	Fall

## 2018-10-01 NOTE — ED ADULT TRIAGE NOTE - CHIEF COMPLAINT QUOTE
pt fell one hour ago, on zeralto, head lac with bleeding( controlles at this time) pt fell one hour ago, on zeralto, head lac with bleeding( controlled at this time)

## 2018-10-01 NOTE — ED PROVIDER NOTE - OBJECTIVE STATEMENT
89yo pmhx htn, hld, a fib on xarelto presents s/p mechanical fall witnessed at home, no loc. +facial trauma. trauma alert called upon arrival. gcs 15, aox3, denies pain aside from facial lacerations.

## 2018-10-01 NOTE — CONSULT NOTE ADULT - ASSESSMENT
ASSESSMENT:  89y old m s/p mechanical fall    PLAN:    -F/u Pan scan  -F/u labs ASSESSMENT:  89y old m s/p mechanical fall on xeralto    PLAN:    -F/u Pan scan  -F/u labs  - outpt f/u for nasal fxs    Senior Trauma Resident Note  Airway intact  Bilateral Breath Sounds  Palpable pulses in 4 ext  GCS 15, PERRL, ROMEO  VSS  No Subq emphysema, abdominal tenderness,  or pelvic instability   CXR and PXR negative  Ct findings above  Will Dispo accordingly  Plan as above d/w Dr Teresa Nam

## 2018-10-01 NOTE — ED PROVIDER NOTE - NS ED ROS FT
General: No fevers, chills, nausea, vomiting  Eyes:  No visual changes, eye pain or discharge.  ENMT:  No hearing changes, pain,   Cardiac:  No chest pain, SOB or edema.  Respiratory:  No cough or respiratory distress  GI:  No nausea, vomiting, diarrhea or abdominal pain.  :  No dysuria, frequency or burning.  MS:  No back pain.  Neuro:  No LOC.  Skin:  No skin rash.   Endocrine: No history of thyroid disease or diabetes.

## 2018-10-01 NOTE — ED PROVIDER NOTE - PHYSICAL EXAMINATION
CONSTITUTIONAL: Well-developed; well-nourished; in no acute distress.   SKIN: warm, dry  HEAD: Normocephalic; <1cm lacerations to forehead, nose, upper lip L sided, bleeding controlled with bandages   EYES: PERRL, EOMI, no conjunctival erythema  ENT: No nasal discharge; airway clear, mucous membranes moist  NECK: Supple, no midline tenderness, c collar in place  CARD: +S1, S2 no murmurs, gallops, or rubs. irreg, radial and dp 2+ b/l, no chest wall tenderness or crepitus   RESP: No wheezes, rales or rhonchi. CTABL  ABD: soft ntnd, no rebound, guarding nor rigidity   EXT: moves all extremities,  No clubbing, cyanosis or edema. no bony tenderness, no gross deformity   NEURO: Alert, oriented, grossly unremarkable, cn ii-xii grossly intact, no focal deficits   PSYCH: Cooperative, appropriate.

## 2018-10-01 NOTE — ED PROVIDER NOTE - CARE PROVIDER_API CALL
Fred Martin (), Internal Medicine  [address]  [place] [address]  Phone: [phone]  Fax: [phone]

## 2018-10-01 NOTE — ED PROVIDER NOTE - PROGRESS NOTE DETAILS
I personally evaluated the patient. I reviewed the Resident’s or Physician Assistant’s note (as assigned above), and agree with the findings and plan except as documented in my note.   89 Y/O M HTN ,DYSLIPIDEMIA, AFIB ON XARELTO, S/P MECHANICAL FALL. + FACIAL TRAUMA. NO LOC. PT C/O FACIAL PAIN. NO HA, N/V. NO NECK OR BACK PAIN. NO CP, SOB. NO ABD PAIN. PT NOT AMBULATORY AFTER THE FALL. VITALS NOTED. ALERT OX3 NAD GCS-15. NCAT. ABRASIONS OVER FOREHEAD, BRIDGE OF NOSE. PERRL, EOMI. NO MIDLINE C SPINE TENDERNESS. LUNGS CLEAR B/L. CHEST NONTENDER, NO CREPITUS. RRR. ABD- SOFT NONTENDER. PELVIS STABLE NONTENDER. BACK NONTENDER. NO SPINE TENDERNESS. NEURO EXAM NONFOCAL. WOUND CARE BY TRAUMA SERVICE.

## 2018-10-08 ENCOUNTER — MEDICATION RENEWAL (OUTPATIENT)
Age: 83
End: 2018-10-08

## 2018-10-18 ENCOUNTER — APPOINTMENT (OUTPATIENT)
Dept: CARDIOLOGY | Facility: CLINIC | Age: 83
End: 2018-10-18
Payer: MEDICARE

## 2018-10-18 ENCOUNTER — NON-APPOINTMENT (OUTPATIENT)
Age: 83
End: 2018-10-18

## 2018-10-18 VITALS
OXYGEN SATURATION: 100 % | HEART RATE: 59 BPM | BODY MASS INDEX: 24.99 KG/M2 | HEIGHT: 65 IN | DIASTOLIC BLOOD PRESSURE: 71 MMHG | WEIGHT: 150 LBS | SYSTOLIC BLOOD PRESSURE: 127 MMHG | TEMPERATURE: 97.4 F

## 2018-10-18 PROCEDURE — 93000 ELECTROCARDIOGRAM COMPLETE: CPT

## 2018-10-18 PROCEDURE — 99214 OFFICE O/P EST MOD 30 MIN: CPT

## 2018-10-19 ENCOUNTER — RX RENEWAL (OUTPATIENT)
Age: 83
End: 2018-10-19

## 2018-10-19 PROBLEM — I48.91 UNSPECIFIED ATRIAL FIBRILLATION: Chronic | Status: ACTIVE | Noted: 2018-10-01

## 2018-10-19 PROBLEM — E78.5 HYPERLIPIDEMIA, UNSPECIFIED: Chronic | Status: ACTIVE | Noted: 2018-10-01

## 2018-11-28 ENCOUNTER — MEDICATION RENEWAL (OUTPATIENT)
Age: 83
End: 2018-11-28

## 2018-12-20 ENCOUNTER — NON-APPOINTMENT (OUTPATIENT)
Age: 83
End: 2018-12-20

## 2018-12-20 ENCOUNTER — APPOINTMENT (OUTPATIENT)
Dept: CARDIOLOGY | Facility: CLINIC | Age: 83
End: 2018-12-20
Payer: MEDICARE

## 2018-12-20 VITALS
WEIGHT: 140 LBS | SYSTOLIC BLOOD PRESSURE: 158 MMHG | OXYGEN SATURATION: 98 % | HEART RATE: 79 BPM | DIASTOLIC BLOOD PRESSURE: 72 MMHG | BODY MASS INDEX: 23.3 KG/M2

## 2018-12-20 PROCEDURE — 36415 COLL VENOUS BLD VENIPUNCTURE: CPT

## 2018-12-20 PROCEDURE — 93000 ELECTROCARDIOGRAM COMPLETE: CPT

## 2018-12-20 PROCEDURE — 99214 OFFICE O/P EST MOD 30 MIN: CPT

## 2018-12-20 NOTE — HISTORY OF PRESENT ILLNESS
[FreeTextEntry1] : (MED HX) The patient's history goes back to 1986 when he had an MI and had a cardiac catheterization which reportedly showed an occluded right coronary artery only. No intervention was done. He had another catheterization in 1997, possibly after another MI which showed a 30% proximal LAD with an ulcerated plaque, an occluded obtuse marginal filling via collaterals, and the old occluded right coronary artery filling via collaterals. He became my patient in 2001 and seemed to have some angina at that time but stable. He also has hypertension and hyperlipidemia. He's had a number of stress tests over the years and they've all shown partially fixed and partially reversible areas mostly inferior and inferolaterally. His left ventricular ejection fractions have always been adequate. He has mild-to-moderate aortic insufficiency and at least moderate mitral regurgitation on echo. He has gastritis and had H. pylori back in 2003.\par On his  visit in July of 2012 he seemed to be complaining of more angina and in addition his blood pressure had been elevated the last few times. He had switched from Diovan 320 to losartan 100 due to insurance issues. I added amlodipine and then we switched to felodipine due to constipation. However he has remained with constipation and when he takes a laxative to relieve the constipation he gets explosive watery diarrhea. His blood pressure at home has at times been around 140 where as in the office at 160-170. His angina is very predictable, is relieved virtually instantly by nitroglycerin, and always comes on at the same spot at the same time every Saturday afternoon. Otherwise he is doing well without symptoms. Based on this I stopped his felodipine on the March visit about 6 weeks ago and he returned in April for followup. His constipation and diarrhea have been mostly improved although he was in [place] over Pass[place] with a different diet. (His wife had issues with high fever from a virus and went to the hospital but seems to be improving now. This did cause some stress for the patient.) He brought his wrist blood pressure machine here. He claims at home it is usually in the 130s. Here it was 147/70 while my reading was 150/68. However when he tried it again a few minutes later it read 198/89. His exam and blood pressure here was unremarkable. He was reassured and told to come back in 2 months. In the interim I believe his family finally found a primary care doctor and he did a few tests, and gave him the number of vitamins to take.  \par September 12, 2013 He is here today for routine followup, complaining of being listless, having less energy and vague pains in his chest and back, tender to touch. He does not have exertional symptoms. He is wondering if it is at all from aging. His constipation is better but it just takes him a long time to have a bowel movement. It wasn't clear if his constipation was better by stopping the amlodipine. He was a little bradycardic and I consider cutting back the atenolol especially if he got worse in the future.\par March 25, 2014. The patient is here for followup. His blood pressure remains somewhat elevated in the 150-160 range. He still remains with constipation and maybe is willing to finally see a GI doctor. It is not complaining of any angina today\par December 4, 2014. Patient is here for followup. His main complaint is still feeling tired and not enough energy. He does not have much in the way of exertional chest pain or shortness of breath. He is not complaining of constipation. He has mild edema but is chronic and he does not think it is worse since going back on the amlodipine. He is thinking of trying Xanax instead of Halcion for sleeping. He had a flu shot already and he had a pneumonia shot a number of years ago but certainly later than 65 years old.\par July 13, 2015. First visit in over 7 months.  No angina per se. He has some issues with dizziness and losing his balance and occasional lightheadedness. He is a very difficult historian. His resting ECG had a heart rate of 51 today but otherwise everything is unchanged. I had already cut his atenolol down from 50-25 in the past. He complains of other symptoms with some numbness and tingling that go up his leg and then both buttocks and lower back which I explained are arthritis/spinal symptoms.\par February 11, 2016. First visit since July. He went to an internist Dr. Martin who did labs about 2 weeks ago and said everything was okay although the patient said some of the values on the report he got were high. His only complaint is that he is much more tired when he is walking and has less aerobic capacity. No exertional chest pain and not exactly shortness of breath. No PND or orthopnea. There is some ankle edema. He claims no change in his medication.\par March 1, 2016. Patient came for echocardiogram. Mild to moderate MR and mild to moderate AI. Normal left atrium. Mild segmental LV systolic dysfunction with an akinetic inferior wall and inferoseptum basal inferior akinetic. Normal LVEF of 55%. Normal RV size and function with mild pulmonic regurg. No change from August of 2005.\par August 3, 2016. The patient is here in followup.  The daughter is mostly concerned because her father has edema for about a month and a half now and she doesn't think it is just from the hot, humid weather. The patient has no complaints of chest pain or shortness of breath with exertion. His biggest problem has been the onset of numbness in his left leg from his mid shin to his foot, not on the right side. He was sent by Dr. Batista to a neurologist, who told him to use a walker and sent him a physical therapist.  The physical therapist eventually told not to do too much walking. He may be on gabapentin.\par December 13, 2016. The patient is here in followup. He mentions that it is now 30 years since he had his heart attack and he is doing well and wants a guarantee for another 30 years. On the whole no complaints. He walks at least a half hour every day and does get tired, but no chest pain. No change in his medications.\par April 24, 2017. Patient is here in followup. He is feeling fine with no symptoms of bradycardia despite a heart rate of about 48-58 and an occasional slight pause. The rest of his complaints are nonspecific, he needs a walker, urinates a little too much, mild chronic pedal edema, etc. No angina, no syncope, or near syncope. Blood pressure and exam are unchanged. He needs clearance for cataract surgery with Dr. Abdirashid Gray.\par August 28, 2017. Patient here in followup. Complains about some double vision ever since the cataract surgery and he still has back and leg pains when he walks. No claudication, no chest pain or shortness of breath. BP control is excellent on only a half of amlodipine 5 mg only has mild edema. Labs from his internist, mostly within normal limits. BUN 27, creatinine 1.3, potassium 4.5. Cholesterol 183, triglycerides 227, HDL 67, LDL 71.\par November 2017. Patient's urologist called that he had a prostate nodule and a PSA of 200. Wanted to change the Plavix to aspirin so he could do a biopsy\par February 20, 2018. Patient is here in followup. No prostate biopsy, but instead he was started on Lupron injections. No other complaints, but on EKG he is in atrial flutter with a ventricular rate in the 60s. No other change with right bundle branch block. Patient with known underlying conduction disease. No exertional chest pain or shortness of breath. Decided to cl Toprol 15 mg daily. Followup in one month and then consider sotalol or Multaq or amio to try to get out of atrial flutter in the hope of stopping anticoagulation at some point.\par March 8, 2018. Patient sent for semiurgent visit after urologist noticed the edema and told him he must see his cardiologist. No change in symptoms or complaints but he does notice more swelling. Also concerned as he has some urinary frequency and urgency already because of his prostate. Heart rhythm does seem more regular today. After discussion with patient and daughter elected to put him on low dose Aldactazide just every other day and partly on a p.r.n. basis and then reassess. His renal function, electrolytes will be reassessed along with his atrial fibrillation on his upcoming visit.\par March 20, 2018. Patient returns now on Aldactazide. Remains in atrial flutter and rest of the EKG unchanged. He has now been anticoagulated for over 4 weeks. He has a UTI, according to the urologist and is now on sulfa. His edema is much better. Long discussion with patient and his daughter here and his other daughter on the phone about the pros and cons of trying to convert him to sinus rhythm given his underlying bradycardia. It was elected to just leave him in atrial flutter, so we don't have to deal with significant bradycardia. His labs came back with an elevated BUN and creatinine and potassium of 5.7. His losartan was stopped and his Aldactazide was changed to Monday, Wednesday, Friday.\par April 9, 2018. Patient now returns in followup. Remains in atrial flutter with mostly 4:1 block with a ventricular rate of 70. Looks somewhat pale, but denies any dark stool. He feels somewhat weak, but is not orthostatic; in fact, the daughter did not give him any of the diuretic so has been off Aldactazide, and losartan the last 2 weeks to 3 weeks. He did see a urologist and he did diagnose a urine infection and gave him Sulfa. He does have some trouble urinating, and sometimes issues with urgency. He has a little trouble walking, with some weakness in his left leg, but if he sits for a few minutes and then gets up, he is much better. No chest pain or shortness of breath. Still with edema.\par May 7, 2018. The patient is here in followup. Around April 14 he was admitted with shortness of breath and bronchospasm and worsening CHF. Was found to be positive for parainfluenza. Treated and gradually improved. Echocardiogram showed LVEF 60% with mild to moderate MR, minimal AS and minimal AI, normal left atrium, mild segmental LV systolic dysfunction, with normal RV size and function. Normal diastolic function. Mild TR the RVSP 28. Patient was discharged with a BUN of 16 a creatinine of 1.16 and potassium of 4.2. He remained on atenolol, atorvastatin, Flomax, Xarelto 15.. He was also found to have MRSA in the urine and was discharged on Bactrim DS. He has been doing better and actually went to [employer] on Saturday and was almost himself. He is here today in followup. He seems a little out of it, a little lethargic. Skin turgor seems to be good. He had been on Halcion while in the hospital has gone back to work plus was taking Xanax at times. He is on Lasix daily.\par June 5, 2018. Patient here in followup. Family finally recognized he does better when he does not take the diuretic and he also does better now that they stopped his Triazolem. He sleeps well with just 0.5 Xanax. He is tolerating the edema. His biggest problem is when he finally has to urinate it comes out so fast he has trouble controlling it. Urology suggested he urinate every 2 hours even if he does not feel the urge. He does seem more back to himself and was able to nehemias on Shavuot. His gait is a little bit wide-based, now, which is probably helping his balance. He did have one fall and at urgent care they did a CAT scan, which showed a moderate pleural effusion on the left and small on the right, which is acceptable.\par August 8, 2018. Patient in followup. Seems to be doing well overall and stable. Does get out, but not as much as he used to. He does complain about his urinating but they will be seeing the urologist soon. EKG he is in atrial flutter with a ventricular rate of 58. Tolerating his mild to moderate edema, excellent blood pressure. Now on Serroquel.\par October 2018. Patient here in followup. Actually, in sinus rhythm, with marked first-degree AV block this time. Ventricular rate 60.Had a fall on "[place]", tripped near his chair with facial trauma. Otherwise, doing okay, but having trouble sleeping at night and is sleeping during the day. After the fall both urologist and his primary care felt it was too dangerous to remain on his Xarelto so this was stopped. Luckily, he is back in sinus rhythm. He had labs October 9 with a normal CBC, normal thyroid function, normal chemistries, including a BUN 28, and creatinine of 1.1, potassium 4.2 sedimentation rate 58, cholesterol 181, HDL 62, LDL 68. . Hemoglobin A1c is 6.2.\par December 20, 2018. 2 weekends ago the patient was found to be unable to be aroused on Shabbos. He was taken to [facility] emergency room. He was mildly bradycardic and hypotensive and seemed to respond to fluids, etc. It was not felt that he needed a pacemaker and the family insisted on taking him home. I spoke with them and had them hold the atenolol. Seems to be more alert and better since then, although recently had some congestion and was given extra Lasix and then they decided to bring him here today because they were nervous. Pulse has been between 70 and 90 most of the time.

## 2018-12-20 NOTE — CARDIOLOGY SUMMARY
[Fixed Defect] : fixed defect [___] : [unfilled] [Mild] : mild LV dysfunction [None] : no pulmonary hypertension [Normal] : normal LA size [Moderate] : moderate mitral regurgitation

## 2018-12-20 NOTE — REVIEW OF SYSTEMS
[Lower Ext Edema] : lower extremity edema [Urinary Frequency] : urinary frequency [see HPI] : see HPI [Numbness (Hypesthesia)] : numbness [Depression] : depression [Negative] : Heme/Lymph [Fever] : no fever [Recent Weight Gain (___ Lbs)] : no recent weight gain [Chills] : no chills [Feeling Fatigued] : not feeling fatigued [Recent Weight Loss (___ Lbs)] : no recent weight loss [Shortness Of Breath] : no shortness of breath [Dyspnea on exertion] : not dyspnea during exertion [Chest  Pressure] : no chest pressure [Chest Pain] : no chest pain [Palpitations] : no palpitations [Abdominal Pain] : no abdominal pain [Heartburn] : no heartburn [Change In The Stool] : no change in stool [Joint Pain] : joint pain [Change In Color Of Skin] : change in skin color [Dizziness] : no dizziness [Confusion] : no confusion was observed [Anxiety] : no anxiety [Suicidal] : not suicidal

## 2018-12-20 NOTE — REASON FOR VISIT
[FreeTextEntry1] : Followup for elderly man with old MI x2, coronary artery disease, hypertension and hyperlipidemia and mild to moderate valvular disease, with edema with new atrial fibrillation last visit, worsening edema after visit to the urologist, and brief hospitalization with parainfluenza, now recent ER for "unresponsiveness"

## 2018-12-20 NOTE — END OF VISIT
[>50% of Time Spent on Coordination of Care for  ___] : Greater than 50% of the encounter time was spent on coordination of care for [unfilled]

## 2018-12-21 LAB
ALBUMIN SERPL ELPH-MCNC: 3.9 G/DL
ALP BLD-CCNC: 78 U/L
ALT SERPL-CCNC: 12 U/L
ANION GAP SERPL CALC-SCNC: 14 MMOL/L
AST SERPL-CCNC: 18 U/L
BASOPHILS # BLD AUTO: 0.02 K/UL
BASOPHILS NFR BLD AUTO: 0.3 %
BILIRUB SERPL-MCNC: 0.7 MG/DL
BUN SERPL-MCNC: 29 MG/DL
CALCIUM SERPL-MCNC: 9.6 MG/DL
CHLORIDE SERPL-SCNC: 104 MMOL/L
CO2 SERPL-SCNC: 27 MMOL/L
CREAT SERPL-MCNC: 1.14 MG/DL
EOSINOPHIL # BLD AUTO: 0.3 K/UL
EOSINOPHIL NFR BLD AUTO: 4.5 %
GLUCOSE SERPL-MCNC: 129 MG/DL
HCT VFR BLD CALC: 39.7 %
HGB BLD-MCNC: 12.7 G/DL
IMM GRANULOCYTES NFR BLD AUTO: 0.5 %
LYMPHOCYTES # BLD AUTO: 1.84 K/UL
LYMPHOCYTES NFR BLD AUTO: 27.7 %
MAN DIFF?: NORMAL
MCHC RBC-ENTMCNC: 29.5 PG
MCHC RBC-ENTMCNC: 32 GM/DL
MCV RBC AUTO: 92.3 FL
MONOCYTES # BLD AUTO: 0.73 K/UL
MONOCYTES NFR BLD AUTO: 11 %
NEUTROPHILS # BLD AUTO: 3.73 K/UL
NEUTROPHILS NFR BLD AUTO: 56 %
NT-PROBNP SERPL-MCNC: 177 PG/ML
PLATELET # BLD AUTO: 167 K/UL
POTASSIUM SERPL-SCNC: 4.2 MMOL/L
PROT SERPL-MCNC: 6.7 G/DL
RBC # BLD: 4.3 M/UL
RBC # FLD: 14.2 %
SODIUM SERPL-SCNC: 145 MMOL/L
TSH SERPL-ACNC: 2.81 UIU/ML
WBC # FLD AUTO: 6.65 K/UL

## 2018-12-24 ENCOUNTER — APPOINTMENT (OUTPATIENT)
Dept: CARDIOLOGY | Facility: CLINIC | Age: 83
End: 2018-12-24

## 2019-03-26 ENCOUNTER — NON-APPOINTMENT (OUTPATIENT)
Age: 84
End: 2019-03-26

## 2019-03-26 ENCOUNTER — APPOINTMENT (OUTPATIENT)
Dept: CARDIOLOGY | Facility: CLINIC | Age: 84
End: 2019-03-26
Payer: MEDICARE

## 2019-03-26 VITALS
OXYGEN SATURATION: 100 % | DIASTOLIC BLOOD PRESSURE: 68 MMHG | TEMPERATURE: 97.4 F | SYSTOLIC BLOOD PRESSURE: 126 MMHG | BODY MASS INDEX: 24.66 KG/M2 | WEIGHT: 148 LBS | HEIGHT: 65 IN | HEART RATE: 63 BPM

## 2019-03-26 PROCEDURE — 93000 ELECTROCARDIOGRAM COMPLETE: CPT

## 2019-03-26 PROCEDURE — 36415 COLL VENOUS BLD VENIPUNCTURE: CPT

## 2019-03-26 PROCEDURE — 99214 OFFICE O/P EST MOD 30 MIN: CPT

## 2019-03-26 NOTE — REVIEW OF SYSTEMS
[Joint Pain] : joint pain [see HPI] : see HPI [Numbness (Hypesthesia)] : numbness [Depression] : depression [Negative] : Heme/Lymph [Fever] : no fever [Recent Weight Gain (___ Lbs)] : recent [unfilled] ~Ulb weight gain [Chills] : no chills [Feeling Fatigued] : feeling fatigued [Recent Weight Loss (___ Lbs)] : no recent weight loss [Shortness Of Breath] : no shortness of breath [Dyspnea on exertion] : not dyspnea during exertion [Chest  Pressure] : no chest pressure [Chest Pain] : no chest pain [Lower Ext Edema] : no extremity edema [Palpitations] : no palpitations [Abdominal Pain] : no abdominal pain [Heartburn] : no heartburn [Change In The Stool] : no change in stool [Urinary Frequency] : no change in urinary frequency [Change In Color Of Skin] : change in skin color [Skin Lesions] : skin lesion(s): [Dizziness] : no dizziness [Confusion] : no confusion was observed [Anxiety] : no anxiety [Suicidal] : not suicidal

## 2019-03-26 NOTE — HISTORY OF PRESENT ILLNESS
[FreeTextEntry1] : (MED HX) The patient's history goes back to 1986 when he had an MI and had a cardiac catheterization which reportedly showed an occluded right coronary artery only. No intervention was done. He had another catheterization in 1997, possibly after another MI which showed a 30% proximal LAD with an ulcerated plaque, an occluded obtuse marginal filling via collaterals, and the old occluded right coronary artery filling via collaterals. He became my patient in 2001 and seemed to have some angina at that time but stable. He also has hypertension and hyperlipidemia. He's had a number of stress tests over the years and they've all shown partially fixed and partially reversible areas mostly inferior and inferolaterally. His left ventricular ejection fractions have always been adequate. He has mild-to-moderate aortic insufficiency and at least moderate mitral regurgitation on echo. He has gastritis and had H. pylori back in 2003.\par On his  visit in July of 2012 he seemed to be complaining of more angina and in addition his blood pressure had been elevated the last few times. He had switched from Diovan 320 to losartan 100 due to insurance issues. I added amlodipine and then we switched to felodipine due to constipation. However he has remained with constipation and when he takes a laxative to relieve the constipation he gets explosive watery diarrhea. His blood pressure at home has at times been around 140 where as in the office at 160-170. His angina is very predictable, is relieved virtually instantly by nitroglycerin, and always comes on at the same spot at the same time every Saturday afternoon. Otherwise he is doing well without symptoms. Based on this I stopped his felodipine on the March visit about 6 weeks ago and he returned in April for followup. His constipation and diarrhea have been mostly improved although he was in [place] over Pass[place] with a different diet. (His wife had issues with high fever from a virus and went to the hospital but seems to be improving now. This did cause some stress for the patient.) He brought his wrist blood pressure machine here. He claims at home it is usually in the 130s. Here it was 147/70 while my reading was 150/68. However when he tried it again a few minutes later it read 198/89. His exam and blood pressure here was unremarkable. He was reassured and told to come back in 2 months. In the interim I believe his family finally found a primary care doctor and he did a few tests, and gave him the number of vitamins to take.  \par September 12, 2013 He is here today for routine followup, complaining of being listless, having less energy and vague pains in his chest and back, tender to touch. He does not have exertional symptoms. He is wondering if it is at all from aging. His constipation is better but it just takes him a long time to have a bowel movement. It wasn't clear if his constipation was better by stopping the amlodipine. He was a little bradycardic and I consider cutting back the atenolol especially if he got worse in the future.\par March 25, 2014. The patient is here for followup. His blood pressure remains somewhat elevated in the 150-160 range. He still remains with constipation and maybe is willing to finally see a GI doctor. It is not complaining of any angina today\par December 4, 2014. Patient is here for followup. His main complaint is still feeling tired and not enough energy. He does not have much in the way of exertional chest pain or shortness of breath. He is not complaining of constipation. He has mild edema but is chronic and he does not think it is worse since going back on the amlodipine. He is thinking of trying Xanax instead of Halcion for sleeping. He had a flu shot already and he had a pneumonia shot a number of years ago but certainly later than 65 years old.\par July 13, 2015. First visit in over 7 months.  No angina per se. He has some issues with dizziness and losing his balance and occasional lightheadedness. He is a very difficult historian. His resting ECG had a heart rate of 51 today but otherwise everything is unchanged. I had already cut his atenolol down from 50-25 in the past. He complains of other symptoms with some numbness and tingling that go up his leg and then both buttocks and lower back which I explained are arthritis/spinal symptoms.\par February 11, 2016. First visit since July. He went to an internist Dr. Martin who did labs about 2 weeks ago and said everything was okay although the patient said some of the values on the report he got were high. His only complaint is that he is much more tired when he is walking and has less aerobic capacity. No exertional chest pain and not exactly shortness of breath. No PND or orthopnea. There is some ankle edema. He claims no change in his medication.\par March 1, 2016. Patient came for echocardiogram. Mild to moderate MR and mild to moderate AI. Normal left atrium. Mild segmental LV systolic dysfunction with an akinetic inferior wall and inferoseptum basal inferior akinetic. Normal LVEF of 55%. Normal RV size and function with mild pulmonic regurg. No change from August of 2005.\par August 3, 2016. The patient is here in followup.  The daughter is mostly concerned because her father has edema for about a month and a half now and she doesn't think it is just from the hot, humid weather. The patient has no complaints of chest pain or shortness of breath with exertion. His biggest problem has been the onset of numbness in his left leg from his mid shin to his foot, not on the right side. He was sent by Dr. Batista to a neurologist, who told him to use a walker and sent him a physical therapist.  The physical therapist eventually told not to do too much walking. He may be on gabapentin.\par December 13, 2016. The patient is here in followup. He mentions that it is now 30 years since he had his heart attack and he is doing well and wants a guarantee for another 30 years. On the whole no complaints. He walks at least a half hour every day and does get tired, but no chest pain. No change in his medications.\par April 24, 2017. Patient is here in followup. He is feeling fine with no symptoms of bradycardia despite a heart rate of about 48-58 and an occasional slight pause. The rest of his complaints are nonspecific, he needs a walker, urinates a little too much, mild chronic pedal edema, etc. No angina, no syncope, or near syncope. Blood pressure and exam are unchanged. He needs clearance for cataract surgery with Dr. Abdirashid Gray.\par August 28, 2017. Patient here in followup. Complains about some double vision ever since the cataract surgery and he still has back and leg pains when he walks. No claudication, no chest pain or shortness of breath. BP control is excellent on only a half of amlodipine 5 mg only has mild edema. Labs from his internist, mostly within normal limits. BUN 27, creatinine 1.3, potassium 4.5. Cholesterol 183, triglycerides 227, HDL 67, LDL 71.\par November 2017. Patient's urologist called that he had a prostate nodule and a PSA of 200. Wanted to change the Plavix to aspirin so he could do a biopsy\par February 20, 2018. Patient is here in followup. No prostate biopsy, but instead he was started on Lupron injections. No other complaints, but on EKG he is in atrial flutter with a ventricular rate in the 60s. No other change with right bundle branch block. Patient with known underlying conduction disease. No exertional chest pain or shortness of breath. Decided to cl Toprol 15 mg daily. Followup in one month and then consider sotalol or Multaq or amio to try to get out of atrial flutter in the hope of stopping anticoagulation at some point.\par March 8, 2018. Patient sent for semiurgent visit after urologist noticed the edema and told him he must see his cardiologist. No change in symptoms or complaints but he does notice more swelling. Also concerned as he has some urinary frequency and urgency already because of his prostate. Heart rhythm does seem more regular today. After discussion with patient and daughter elected to put him on low dose Aldactazide just every other day and partly on a p.r.n. basis and then reassess. His renal function, electrolytes will be reassessed along with his atrial fibrillation on his upcoming visit.\par March 20, 2018. Patient returns now on Aldactazide. Remains in atrial flutter and rest of the EKG unchanged. He has now been anticoagulated for over 4 weeks. He has a UTI, according to the urologist and is now on sulfa. His edema is much better. Long discussion with patient and his daughter here and his other daughter on the phone about the pros and cons of trying to convert him to sinus rhythm given his underlying bradycardia. It was elected to just leave him in atrial flutter, so we don't have to deal with significant bradycardia. His labs came back with an elevated BUN and creatinine and potassium of 5.7. His losartan was stopped and his Aldactazide was changed to Monday, Wednesday, Friday.\par April 9, 2018. Patient now returns in followup. Remains in atrial flutter with mostly 4:1 block with a ventricular rate of 70. Looks somewhat pale, but denies any dark stool. He feels somewhat weak, but is not orthostatic; in fact, the daughter did not give him any of the diuretic so has been off Aldactazide, and losartan the last 2 weeks to 3 weeks. He did see a urologist and he did diagnose a urine infection and gave him Sulfa. He does have some trouble urinating, and sometimes issues with urgency. He has a little trouble walking, with some weakness in his left leg, but if he sits for a few minutes and then gets up, he is much better. No chest pain or shortness of breath. Still with edema.\par May 7, 2018. The patient is here in followup. Around April 14 he was admitted with shortness of breath and bronchospasm and worsening CHF. Was found to be positive for parainfluenza. Treated and gradually improved. Echocardiogram showed LVEF 60% with mild to moderate MR, minimal AS and minimal AI, normal left atrium, mild segmental LV systolic dysfunction, with normal RV size and function. Normal diastolic function. Mild TR the RVSP 28. Patient was discharged with a BUN of 16 a creatinine of 1.16 and potassium of 4.2. He remained on atenolol, atorvastatin, Flomax, Xarelto 15.. He was also found to have MRSA in the urine and was discharged on Bactrim DS. He has been doing better and actually went to [place] on Saturday and was almost himself. He is here today in followup. He seems a little out of it, a little lethargic. Skin turgor seems to be good. He had been on Halcion while in the hospital has gone back to work plus was taking Xanax at times. He is on Lasix daily.\par June 5, 2018. Patient here in followup. Family finally recognized he does better when he does not take the diuretic and he also does better now that they stopped his Triazolem. He sleeps well with just 0.5 Xanax. He is tolerating the edema. His biggest problem is when he finally has to urinate it comes out so fast he has trouble controlling it. Urology suggested he urinate every 2 hours even if he does not feel the urge. He does seem more back to himself and was able to nehemias on Shavuot. His gait is a little bit wide-based, now, which is probably helping his balance. He did have one fall and at urgent care they did a CAT scan, which showed a moderate pleural effusion on the left and small on the right, which is acceptable.\par August 8, 2018. Patient in followup. Seems to be doing well overall and stable. Does get out, but not as much as he used to. He does complain about his urinating but they will be seeing the urologist soon. EKG he is in atrial flutter with a ventricular rate of 58. Tolerating his mild to moderate edema, excellent blood pressure. Now on Serroquel.\par October 2018. Patient here in followup. Actually, in sinus rhythm, with marked first-degree AV block this time. Ventricular rate 60.Had a fall on "[place]", tripped near his chair with facial trauma. Otherwise, doing okay, but having trouble sleeping at night and is sleeping during the day. After the fall both urologist and his primary care felt it was too dangerous to remain on his Xarelto so this was stopped. Luckily, he is back in sinus rhythm. He had labs October 9 with a normal CBC, normal thyroid function, normal chemistries, including a BUN 28, and creatinine of 1.1, potassium 4.2 sedimentation rate 58, cholesterol 181, HDL 62, LDL 68. . Hemoglobin A1c is 6.2.\par December 20, 2018. 2 weekends ago the patient was found to be unable to be aroused on Shabbos. He was taken to [facility] emergency room. He was mildly bradycardic and hypotensive and seemed to respond to fluids, etc. It was not felt that he needed a pacemaker and the family insisted on taking him home. I spoke with them and had them hold the atenolol. Seems to be more alert and better since then, although recently had some congestion and was given extra Lasix and then they decided to bring him here today because they were nervous. Pulse has been between 70 and 90 most of the time. \par March 26, 2019. Patient returns in followup. Remains in sinus rhythm at 61 with a first degree AV block and right bundle branch block. PSA which had been way down went back up to 25 and Dr. Sunshine put him back on Casodex. The family declined Lupron. Patient sleeping somewhat better, but not perfect. Using melatonin, Serroquel, and at times Tamezpam and at times, Halcion. Has an ulcer at the bottom of his feet that is being cared for by podiatry. His bone pain that is probably from metastatic disease, but they don't see any point in doing a bone scan, etc.

## 2019-03-26 NOTE — PHYSICAL EXAM
[General Appearance - Well Developed] : well developed [Normal Appearance] : normal appearance [Well Groomed] : well groomed [General Appearance - Well Nourished] : well nourished [No Deformities] : no deformities [General Appearance - In No Acute Distress] : no acute distress [Normal Conjunctiva] : the conjunctiva exhibited no abnormalities [Eyelids - No Xanthelasma] : the eyelids demonstrated no xanthelasmas [Normal Oral Mucosa] : normal oral mucosa [No Oral Pallor] : no oral pallor [No Oral Cyanosis] : no oral cyanosis [Normal Jugular Venous A Waves Present] : normal jugular venous A waves present [Normal Jugular Venous V Waves Present] : normal jugular venous V waves present [No Jugular Venous Rabago A Waves] : no jugular venous rabago A waves [Respiration, Rhythm And Depth] : normal respiratory rhythm and effort [Exaggerated Use Of Accessory Muscles For Inspiration] : no accessory muscle use [Auscultation Breath Sounds / Voice Sounds] : lungs were clear to auscultation bilaterally [Heart Rate And Rhythm] : heart rate and rhythm were normal [Heart Sounds] : normal S1 and S2 [Murmurs] : no murmurs present [Abdomen Soft] : soft [Abdomen Tenderness] : non-tender [Abdomen Mass (___ Cm)] : no abdominal mass palpated [Nail Clubbing] : no clubbing of the fingernails [Cyanosis, Localized] : no localized cyanosis [Petechial Hemorrhages (___cm)] : no petechial hemorrhages [Skin Color & Pigmentation] : normal skin color and pigmentation [Skin Turgor] : normal skin turgor [] : no rash [No Venous Stasis] : no venous stasis [Skin Lesions] : no skin lesions [No Skin Ulcers] : no skin ulcer [No Xanthoma] : no  xanthoma was observed [Oriented To Time, Place, And Person] : oriented to person, place, and time [Affect] : the affect was normal [Mood] : the mood was normal [No Anxiety] : not feeling anxious [FreeTextEntry1] : Does not seem volume depleted

## 2019-03-27 LAB
ALBUMIN SERPL ELPH-MCNC: 4 G/DL
ALP BLD-CCNC: 85 U/L
ALT SERPL-CCNC: 23 U/L
ANION GAP SERPL CALC-SCNC: 11 MMOL/L
AST SERPL-CCNC: 21 U/L
BASOPHILS # BLD AUTO: 0.03 K/UL
BASOPHILS NFR BLD AUTO: 0.5 %
BILIRUB SERPL-MCNC: 0.9 MG/DL
BUN SERPL-MCNC: 29 MG/DL
CALCIUM SERPL-MCNC: 9.5 MG/DL
CHLORIDE SERPL-SCNC: 104 MMOL/L
CHOLEST SERPL-MCNC: 161 MG/DL
CHOLEST/HDLC SERPL: 2.7 RATIO
CO2 SERPL-SCNC: 27 MMOL/L
CREAT SERPL-MCNC: 1.24 MG/DL
EOSINOPHIL # BLD AUTO: 0.14 K/UL
EOSINOPHIL NFR BLD AUTO: 2.2 %
ESTIMATED AVERAGE GLUCOSE: 123 MG/DL
GLUCOSE SERPL-MCNC: 95 MG/DL
HBA1C MFR BLD HPLC: 5.9 %
HCT VFR BLD CALC: 40.4 %
HDLC SERPL-MCNC: 60 MG/DL
HGB BLD-MCNC: 12.6 G/DL
IMM GRANULOCYTES NFR BLD AUTO: 0.3 %
LDLC SERPL CALC-MCNC: 62 MG/DL
LYMPHOCYTES # BLD AUTO: 1.24 K/UL
LYMPHOCYTES NFR BLD AUTO: 19.3 %
MAN DIFF?: NORMAL
MCHC RBC-ENTMCNC: 29.4 PG
MCHC RBC-ENTMCNC: 31.2 GM/DL
MCV RBC AUTO: 94.4 FL
MONOCYTES # BLD AUTO: 0.56 K/UL
MONOCYTES NFR BLD AUTO: 8.7 %
NEUTROPHILS # BLD AUTO: 4.45 K/UL
NEUTROPHILS NFR BLD AUTO: 69 %
NT-PROBNP SERPL-MCNC: 281 PG/ML
PLATELET # BLD AUTO: 128 K/UL
POTASSIUM SERPL-SCNC: 4.4 MMOL/L
PROT SERPL-MCNC: 6.5 G/DL
PSA SERPL-MCNC: 24.5 NG/ML
RBC # BLD: 4.28 M/UL
RBC # FLD: 13.7 %
SODIUM SERPL-SCNC: 142 MMOL/L
TRIGL SERPL-MCNC: 193 MG/DL
WBC # FLD AUTO: 6.44 K/UL

## 2019-04-01 LAB — TSH SERPL-ACNC: 1.98 UIU/ML

## 2019-06-17 ENCOUNTER — MEDICATION RENEWAL (OUTPATIENT)
Age: 84
End: 2019-06-17

## 2019-06-25 ENCOUNTER — NON-APPOINTMENT (OUTPATIENT)
Age: 84
End: 2019-06-25

## 2019-06-25 ENCOUNTER — APPOINTMENT (OUTPATIENT)
Dept: CARDIOLOGY | Facility: CLINIC | Age: 84
End: 2019-06-25
Payer: MEDICARE

## 2019-06-25 VITALS
SYSTOLIC BLOOD PRESSURE: 133 MMHG | HEIGHT: 65 IN | HEART RATE: 65 BPM | BODY MASS INDEX: 24.16 KG/M2 | OXYGEN SATURATION: 97 % | DIASTOLIC BLOOD PRESSURE: 70 MMHG | WEIGHT: 145 LBS

## 2019-06-25 DIAGNOSIS — I35.2 NONRHEUMATIC AORTIC (VALVE) STENOSIS WITH INSUFFICIENCY: ICD-10-CM

## 2019-06-25 PROCEDURE — 93000 ELECTROCARDIOGRAM COMPLETE: CPT

## 2019-06-25 PROCEDURE — 36415 COLL VENOUS BLD VENIPUNCTURE: CPT

## 2019-06-25 PROCEDURE — 99214 OFFICE O/P EST MOD 30 MIN: CPT

## 2019-06-25 RX ORDER — RIVAROXABAN 15 MG/1
15 TABLET, FILM COATED ORAL
Qty: 30 | Refills: 2 | Status: DISCONTINUED | COMMUNITY
Start: 2018-08-23 | End: 2019-06-25

## 2019-06-25 RX ORDER — RIVAROXABAN 15 MG/1
15 TABLET, FILM COATED ORAL
Qty: 30 | Refills: 2 | Status: DISCONTINUED | COMMUNITY
Start: 2018-02-20 | End: 2019-06-25

## 2019-06-25 NOTE — CARDIOLOGY SUMMARY
[___] : [unfilled] [Fixed Defect] : fixed defect [None] : no pulmonary hypertension [Mild] : mild LV dysfunction [Normal] : normal LA size [Moderate] : moderate mitral regurgitation

## 2019-06-26 LAB
ALBUMIN SERPL ELPH-MCNC: 4 G/DL
ALP BLD-CCNC: 86 U/L
ALT SERPL-CCNC: 14 U/L
ANION GAP SERPL CALC-SCNC: 13 MMOL/L
APPEARANCE: CLEAR
AST SERPL-CCNC: 17 U/L
BACTERIA: NEGATIVE
BASOPHILS # BLD AUTO: 0.03 K/UL
BASOPHILS NFR BLD AUTO: 0.5 %
BILIRUB SERPL-MCNC: 0.9 MG/DL
BILIRUBIN URINE: NEGATIVE
BLOOD URINE: NEGATIVE
BUN SERPL-MCNC: 22 MG/DL
CALCIUM SERPL-MCNC: 9.9 MG/DL
CHLORIDE SERPL-SCNC: 102 MMOL/L
CO2 SERPL-SCNC: 26 MMOL/L
COLOR: YELLOW
CREAT SERPL-MCNC: 1.27 MG/DL
EOSINOPHIL # BLD AUTO: 0.1 K/UL
EOSINOPHIL NFR BLD AUTO: 1.7 %
ESTIMATED AVERAGE GLUCOSE: 120 MG/DL
GLUCOSE QUALITATIVE U: NEGATIVE
GLUCOSE SERPL-MCNC: 103 MG/DL
HBA1C MFR BLD HPLC: 5.8 %
HCT VFR BLD CALC: 39.9 %
HGB BLD-MCNC: 12.3 G/DL
HYALINE CASTS: 1 /LPF
IMM GRANULOCYTES NFR BLD AUTO: 0.3 %
KETONES URINE: NEGATIVE
LEUKOCYTE ESTERASE URINE: NEGATIVE
LYMPHOCYTES # BLD AUTO: 1.74 K/UL
LYMPHOCYTES NFR BLD AUTO: 29.5 %
MAN DIFF?: NORMAL
MCHC RBC-ENTMCNC: 29.6 PG
MCHC RBC-ENTMCNC: 30.8 GM/DL
MCV RBC AUTO: 95.9 FL
MICROSCOPIC-UA: NORMAL
MONOCYTES # BLD AUTO: 0.57 K/UL
MONOCYTES NFR BLD AUTO: 9.7 %
NEUTROPHILS # BLD AUTO: 3.43 K/UL
NEUTROPHILS NFR BLD AUTO: 58.3 %
NITRITE URINE: NEGATIVE
NT-PROBNP SERPL-MCNC: 280 PG/ML
PH URINE: 6
PLATELET # BLD AUTO: 132 K/UL
POTASSIUM SERPL-SCNC: 4.5 MMOL/L
PROT SERPL-MCNC: 6.8 G/DL
PROTEIN URINE: NEGATIVE
PSA SERPL-MCNC: 6.62 NG/ML
RBC # BLD: 4.16 M/UL
RBC # FLD: 14.1 %
RED BLOOD CELLS URINE: 1 /HPF
SODIUM SERPL-SCNC: 141 MMOL/L
SPECIFIC GRAVITY URINE: 1.02
SQUAMOUS EPITHELIAL CELLS: 0 /HPF
UROBILINOGEN URINE: NORMAL
WBC # FLD AUTO: 5.89 K/UL
WHITE BLOOD CELLS URINE: 1 /HPF

## 2019-06-28 LAB — BACTERIA UR CULT: ABNORMAL

## 2019-08-20 ENCOUNTER — MEDICATION RENEWAL (OUTPATIENT)
Age: 84
End: 2019-08-20

## 2019-09-18 NOTE — ED ADULT NURSE NOTE - NS ED PATIENT SAFETY CONCERN
From: Salma Hare  To: Joleen Robb MD  Sent: 9/18/2019 9:09 AM CDT  Subject: Medication Question    Hi Dr. Lopez, I am having a flare up with my back related to my scoliosis. I have been taking my meloxicam and over the counter pain along with going to the chiro, massage and trying ice and heat and I just cannot get the inflammation to go down. This is similar to the flare up I had a few years ago when the ortho gave me a steriod to reduce the inflammation and then sent me for therapy. Not sure if you are able to prescribe a steriod to get the inflammation under control. This has been going on for about a month now.     Thank you for taking the time -    Salma Hare   No

## 2019-09-26 ENCOUNTER — RX RENEWAL (OUTPATIENT)
Age: 84
End: 2019-09-26

## 2019-10-04 ENCOUNTER — CLINICAL ADVICE (OUTPATIENT)
Age: 84
End: 2019-10-04

## 2019-10-04 NOTE — HISTORY OF PRESENT ILLNESS
[FreeTextEntry1] : (MED HX) The patient's history goes back to 1986 when he had an MI and had a cardiac catheterization which reportedly showed an occluded right coronary artery only. No intervention was done. He had another catheterization in 1997, possibly after another MI which showed a 30% proximal LAD with an ulcerated plaque, an occluded obtuse marginal filling via collaterals, and the old occluded right coronary artery filling via collaterals. He became my patient in 2001 and seemed to have some angina at that time but stable. He also has hypertension and hyperlipidemia. He's had a number of stress tests over the years and they've all shown partially fixed and partially reversible areas mostly inferior and inferolaterally. His left ventricular ejection fractions have always been adequate. He has mild-to-moderate aortic insufficiency and at least moderate mitral regurgitation on echo. He has gastritis and had H. pylori back in 2003.\par On his  visit in July of 2012 he seemed to be complaining of more angina and in addition his blood pressure had been elevated the last few times. He had switched from Diovan 320 to losartan 100 due to insurance issues. I added amlodipine and then we switched to felodipine due to constipation. However he has remained with constipation and when he takes a laxative to relieve the constipation he gets explosive watery diarrhea. His blood pressure at home has at times been around 140 where as in the office at 160-170. His angina is very predictable, is relieved virtually instantly by nitroglycerin, and always comes on at the same spot at the same time every Saturday afternoon. Otherwise he is doing well without symptoms. Based on this I stopped his felodipine on the March visit about 6 weeks ago and he returned in April for followup. His constipation and diarrhea have been mostly improved although he was in [place] over Pass[place] with a different diet. (His wife had issues with high fever from a virus and went to the hospital but seems to be improving now. This did cause some stress for the patient.) He brought his wrist blood pressure machine here. He claims at home it is usually in the 130s. Here it was 147/70 while my reading was 150/68. However when he tried it again a few minutes later it read 198/89. His exam and blood pressure here was unremarkable. He was reassured and told to come back in 2 months. In the interim I believe his family finally found a primary care doctor and he did a few tests, and gave him the number of vitamins to take.  \par September 12, 2013 He is here today for routine followup, complaining of being listless, having less energy and vague pains in his chest and back, tender to touch. He does not have exertional symptoms. He is wondering if it is at all from aging. His constipation is better but it just takes him a long time to have a bowel movement. It wasn't clear if his constipation was better by stopping the amlodipine. He was a little bradycardic and I consider cutting back the atenolol especially if he got worse in the future.\par March 25, 2014. The patient is here for followup. His blood pressure remains somewhat elevated in the 150-160 range. He still remains with constipation and maybe is willing to finally see a GI doctor. It is not complaining of any angina today\par December 4, 2014. Patient is here for followup. His main complaint is still feeling tired and not enough energy. He does not have much in the way of exertional chest pain or shortness of breath. He is not complaining of constipation. He has mild edema but is chronic and he does not think it is worse since going back on the amlodipine. He is thinking of trying Xanax instead of Halcion for sleeping. He had a flu shot already and he had a pneumonia shot a number of years ago but certainly later than 65 years old.\par July 13, 2015. First visit in over 7 months.  No angina per se. He has some issues with dizziness and losing his balance and occasional lightheadedness. He is a very difficult historian. His resting ECG had a heart rate of 51 today but otherwise everything is unchanged. I had already cut his atenolol down from 50-25 in the past. He complains of other symptoms with some numbness and tingling that go up his leg and then both buttocks and lower back which I explained are arthritis/spinal symptoms.\par February 11, 2016. First visit since July. He went to an internist Dr. Martin who did labs about 2 weeks ago and said everything was okay although the patient said some of the values on the report he got were high. His only complaint is that he is much more tired when he is walking and has less aerobic capacity. No exertional chest pain and not exactly shortness of breath. No PND or orthopnea. There is some ankle edema. He claims no change in his medication.\par March 1, 2016. Patient came for echocardiogram. Mild to moderate MR and mild to moderate AI. Normal left atrium. Mild segmental LV systolic dysfunction with an akinetic inferior wall and inferoseptum basal inferior akinetic. Normal LVEF of 55%. Normal RV size and function with mild pulmonic regurg. No change from August of 2005.\par August 3, 2016. The patient is here in followup.  The daughter is mostly concerned because her father has edema for about a month and a half now and she doesn't think it is just from the hot, humid weather. The patient has no complaints of chest pain or shortness of breath with exertion. His biggest problem has been the onset of numbness in his left leg from his mid shin to his foot, not on the right side. He was sent by Dr. Batista to a neurologist, who told him to use a walker and sent him a physical therapist.  The physical therapist eventually told not to do too much walking. He may be on gabapentin.\par December 13, 2016. The patient is here in followup. He mentions that it is now 30 years since he had his heart attack and he is doing well and wants a guarantee for another 30 years. On the whole no complaints. He walks at least a half hour every day and does get tired, but no chest pain. No change in his medications.\par April 24, 2017. Patient is here in followup. He is feeling fine with no symptoms of bradycardia despite a heart rate of about 48-58 and an occasional slight pause. The rest of his complaints are nonspecific, he needs a walker, urinates a little too much, mild chronic pedal edema, etc. No angina, no syncope, or near syncope. Blood pressure and exam are unchanged. He needs clearance for cataract surgery with Dr. Abdirashid Gray.\par August 28, 2017. Patient here in followup. Complains about some double vision ever since the cataract surgery and he still has back and leg pains when he walks. No claudication, no chest pain or shortness of breath. BP control is excellent on only a half of amlodipine 5 mg only has mild edema. Labs from his internist, mostly within normal limits. BUN 27, creatinine 1.3, potassium 4.5. Cholesterol 183, triglycerides 227, HDL 67, LDL 71.\par November 2017. Patient's urologist called that he had a prostate nodule and a PSA of 200. Wanted to change the Plavix to aspirin so he could do a biopsy\par February 20, 2018. Patient is here in followup. No prostate biopsy, but instead he was started on Lupron injections. No other complaints, but on EKG he is in atrial flutter with a ventricular rate in the 60s. No other change with right bundle branch block. Patient with known underlying conduction disease. No exertional chest pain or shortness of breath. Decided to cl Toprol 15 mg daily. Followup in one month and then consider sotalol or Multaq or amio to try to get out of atrial flutter in the hope of stopping anticoagulation at some point.\par March 8, 2018. Patient sent for semiurgent visit after urologist noticed the edema and told him he must see his cardiologist. No change in symptoms or complaints but he does notice more swelling. Also concerned as he has some urinary frequency and urgency already because of his prostate. Heart rhythm does seem more regular today. After discussion with patient and daughter elected to put him on low dose Aldactazide just every other day and partly on a p.r.n. basis and then reassess. His renal function, electrolytes will be reassessed along with his atrial fibrillation on his upcoming visit.\par March 20, 2018. Patient returns now on Aldactazide. Remains in atrial flutter and rest of the EKG unchanged. He has now been anticoagulated for over 4 weeks. He has a UTI, according to the urologist and is now on sulfa. His edema is much better. Long discussion with patient and his daughter here and his other daughter on the phone about the pros and cons of trying to convert him to sinus rhythm given his underlying bradycardia. It was elected to just leave him in atrial flutter, so we don't have to deal with significant bradycardia. His labs came back with an elevated BUN and creatinine and potassium of 5.7. His losartan was stopped and his Aldactazide was changed to Monday, Wednesday, Friday.\par April 9, 2018. Patient now returns in followup. Remains in atrial flutter with mostly 4:1 block with a ventricular rate of 70. Looks somewhat pale, but denies any dark stool. He feels somewhat weak, but is not orthostatic; in fact, the daughter did not give him any of the diuretic so has been off Aldactazide, and losartan the last 2 weeks to 3 weeks. He did see a urologist and he did diagnose a urine infection and gave him Sulfa. He does have some trouble urinating, and sometimes issues with urgency. He has a little trouble walking, with some weakness in his left leg, but if he sits for a few minutes and then gets up, he is much better. No chest pain or shortness of breath. Still with edema.\par May 7, 2018. The patient is here in followup. Around April 14 he was admitted with shortness of breath and bronchospasm and worsening CHF. Was found to be positive for parainfluenza. Treated and gradually improved. Echocardiogram showed LVEF 60% with mild to moderate MR, minimal AS and minimal AI, normal left atrium, mild segmental LV systolic dysfunction, with normal RV size and function. Normal diastolic function. Mild TR the RVSP 28. Patient was discharged with a BUN of 16 a creatinine of 1.16 and potassium of 4.2. He remained on atenolol, atorvastatin, Flomax, Xarelto 15.. He was also found to have MRSA in the urine and was discharged on Bactrim DS. He has been doing better and actually went to [place] on Saturday and was almost himself. He is here today in followup. He seems a little out of it, a little lethargic. Skin turgor seems to be good. He had been on Halcion while in the hospital has gone back to work plus was taking Xanax at times. He is on Lasix daily.\par June 5, 2018. Patient here in followup. Family finally recognized he does better when he does not take the diuretic and he also does better now that they stopped his Triazolem. He sleeps well with just 0.5 Xanax. He is tolerating the edema. His biggest problem is when he finally has to urinate it comes out so fast he has trouble controlling it. Urology suggested he urinate every 2 hours even if he does not feel the urge. He does seem more back to himself and was able to nehemias on Shavuot. His gait is a little bit wide-based, now, which is probably helping his balance. He did have one fall and at urgent care they did a CAT scan, which showed a moderate pleural effusion on the left and small on the right, which is acceptable.\par August 8, 2018. Patient in followup. Seems to be doing well overall and stable. Does get out, but not as much as he used to. He does complain about his urinating but they will be seeing the urologist soon. EKG he is in atrial flutter with a ventricular rate of 58. Tolerating his mild to moderate edema, excellent blood pressure. Now on Serroquel.\par October 2018. Patient here in followup. Actually, in sinus rhythm, with marked first-degree AV block this time. Ventricular rate 60.Had a fall on "[place]", tripped near his chair with facial trauma. Otherwise, doing okay, but having trouble sleeping at night and is sleeping during the day. After the fall both urologist and his primary care felt it was too dangerous to remain on his Xarelto so this was stopped. Luckily, he is back in sinus rhythm. He had labs October 9 with a normal CBC, normal thyroid function, normal chemistries, including a BUN 28, and creatinine of 1.1, potassium 4.2 sedimentation rate 58, cholesterol 181, HDL 62, LDL 68. . Hemoglobin A1c is 6.2.\par December 20, 2018. 2 weekends ago the patient was found to be unable to be aroused on Shabbos. He was taken to [facility] emergency room. He was mildly bradycardic and hypotensive and seemed to respond to fluids, etc. It was not felt that he needed a pacemaker and the family insisted on taking him home. I spoke with them and had them hold the atenolol. Seems to be more alert and better since then, although recently had some congestion and was given extra Lasix and then they decided to bring him here today because they were nervous. Pulse has been between 70 and 90 most of the time. \par March 26, 2019. Patient returns in followup. Remains in sinus rhythm at 61 with a first degree AV block and right bundle branch block. PSA which had been way down went back up to 25 and Dr. Sunshine put him back on Casodex. The family declined Lupron. Patient sleeping somewhat better, but not perfect. Using melatonin, Serroquel, and at times Tamezpam and at times, Halcion. Has an ulcer at the bottom of his feet that is being cared for by podiatry. His bone pain that is probably from metastatic disease, but they don't see any point in doing a bone scan, etc.\par June 25, 2019. Patient here in followup. Is in sinus rhythm with a first-degree AV block. His family called yesterday concerned that the physical therapist thought his blood pressure is 102/37, but I reassured them. Overall he is doing well, but his biggest concern is he is unsteadiness and he doesn't go out anywhere because he is afraid to fall. I tried to encourage him and say that as long as he has somebody with him he should be okay, but he has issues with his independence as well. We also talked about cutting back on the serroquel or sleeping medications to see if that helps. He recently had 3+ microscopic hematuria with Dr. Sunshine, and this will be repeated. If it persists, perhaps his Plavix will be changed to baby aspirin. He is not on full anticoagulation. No chest pain, shortness of breath, dizziness, etc.

## 2019-10-04 NOTE — DIETITIAN INITIAL EVALUATION ADULT. - WEIGHT IN LBS
[facility] Medicine Daily Progress Note    Date of Service  10/4/2019    Chief Complaint  68 y.o. male admitted 9/26/2019 with diarrhea and SI    Hospital Course    Patient is a 68-year-old male with history of methamphetamine abuse, severe depression, alcohol abuse, chronic low back pain, gout here with suicidal ideation.  Says he was given a jump in front of a train or a bus but decided to talk with a psychiatrist first.  He also relates history of 7 days of diarrhea and generalized malaise body aches.  Suspect to be suffering from a likely viral gastroenteritis and treated conservatively.      Interval Problem Update  10/1.  Still pending bone marrow biopsy however not scheduled for today.  Reordered and scheduled for tomorrow.  Patient's WBC improved to 2.2.  Patient complains of general body achiness and depression. Patient's pain is general 2-3/10, intermittent and does not radiate to other location, sharp and with some tingling. Can be controlled by pain meds.  10/2.  Patient overall stable.  No significant overnight event.  Tolerated bone marrow biopsy procedure.  Complains of biopsy site pain. Patient's pain is local, 4-5/10, intermittent and does not radiate to other location, sharp and with some tingling. Can be controlled by pain meds. Dressing in place.  10/3.  Patient feels stable overnight.  Tolerated meal.  Patient's bone marrow biopsy report showing no significant signs of primary hematological disorder for pancytopenia.  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  Patient complains of general body achiness but otherwise remained stable. Patient otherwise denies fever, chills, nausea, vomiting, adb pain, SOB, CP, headache, constipation, diarrhea, cough, or sputum.  10/4.  Patient is comfortable and mood stable.  Patient's bone marrow biopsy reported benign results.  Hematologist recommended monitor and absence of alcohol because patient's pancytopenia likely due to alcohol usage at  home.  Patient complains of general body aches, Patient's pain is general, 2-3/10, intermittent and does not radiate to other location, sharp and with some tingling. Can be controlled by pain meds.       Consultants/Specialty  Psychiatry    Code Status  Full    Disposition  Patient is medically stable to be transferred to inpatient psych facility.  patient is currently on a legal hold with suicidal ideation precautions    Review of Systems  Review of Systems   Constitutional: Positive for malaise/fatigue. Negative for diaphoresis, fever and weight loss.   HENT: Negative for congestion, ear discharge, ear pain, hearing loss and nosebleeds.    Eyes: Negative for blurred vision and pain.   Respiratory: Negative for cough, sputum production and wheezing.    Cardiovascular: Negative for palpitations, claudication and PND.   Gastrointestinal: Negative for constipation, diarrhea, heartburn, melena and vomiting.   Genitourinary: Negative for dysuria, frequency and hematuria.   Musculoskeletal: Positive for back pain. Negative for falls and neck pain.   Skin: Negative for rash.   Neurological: Positive for weakness. Negative for dizziness, tingling, focal weakness and seizures.   Psychiatric/Behavioral: Positive for depression. Negative for substance abuse and suicidal ideas.   All other systems reviewed and are negative.       Physical Exam  Temp:  [36.2 °C (97.2 °F)-36.5 °C (97.7 °F)] 36.2 °C (97.2 °F)  Pulse:  [50-60] 50  Resp:  [16-18] 17  BP: (100-118)/(54-74) 116/74  SpO2:  [93 %-95 %] 95 %    Physical Exam   Constitutional: He is oriented to person, place, and time. He appears well-nourished. No distress.   HENT:   Head: Normocephalic and atraumatic.   Eyes: Pupils are equal, round, and reactive to light. EOM are normal. No scleral icterus.   Neck: Normal range of motion. Neck supple. No JVD present.   Cardiovascular: Normal rate, regular rhythm and normal heart sounds. Exam reveals no gallop.   No murmur  164.9 heard.  Pulmonary/Chest: Effort normal and breath sounds normal. No stridor. No respiratory distress. He has no wheezes. He exhibits no tenderness.   Abdominal: Soft. Bowel sounds are normal. He exhibits no distension and no mass. There is no rebound and no guarding.   Musculoskeletal: He exhibits no edema.   Lymphadenopathy:     He has no cervical adenopathy.   Neurological: He is alert and oriented to person, place, and time. He displays normal reflexes. Coordination normal.   Skin: Skin is warm and dry. No rash noted.   Psychiatric: He exhibits a depressed mood.   Patient feels depressed   Nursing note and vitals reviewed.      Fluids    Intake/Output Summary (Last 24 hours) at 10/4/2019 1522  Last data filed at 10/4/2019 1215  Gross per 24 hour   Intake 2320 ml   Output --   Net 2320 ml       Laboratory  Recent Labs     10/02/19  0310 10/03/19  0058   WBC 2.5* 2.6*   RBC 3.81* 3.81*   HEMOGLOBIN 12.7* 12.5*   HEMATOCRIT 38.4* 38.9*   .8* 102.1*   MCH 33.3* 32.8   MCHC 33.1* 32.1*   RDW 53.4* 54.2*   PLATELETCT 68* 69*   MPV 11.6 11.3     Recent Labs     10/02/19  0310 10/03/19  0058   SODIUM 140 137   POTASSIUM 4.1 4.4   CHLORIDE 107 103   CO2 26 26   GLUCOSE 76 97   BUN 15 20   CREATININE 0.78 0.90   CALCIUM 8.4* 8.8                   Imaging  CT-ABDOMEN-PELVIS W/O   Final Result         1.  No evidence of bowel obstruction or bowel inflammation.      2.  Mild splenomegaly.      3.  No abdominal or pelvic adenopathy.      4.  Cholelithiasis. No biliary dilatation.           Assessment/Plan  Alcoholism (HCC)- (present on admission)  Assessment & Plan  Patient does carry history of alcoholism, he denies any active drinking   Psychiatry team is following    Daily magnesium, thiamine supplementation  B12, TSH, folate  Replace vitamins oral, Mag IV daily until >2.0  Currently have no signs of withdrawal continue to monitor    Patient's bone marrow biopsy report showing no significant signs of primary  hematological disorder for pancytopenia.  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      Suicidal ideation- (present on admission)  Assessment & Plan  Psychiatry c/s   Probable discharge to an inpatient psych bed.    Remains on legal hold  Continue medication    Leukopenia  Assessment & Plan  Stat recheck 9/28 w WBCs at 1.8->2.1->2.5->2.6  ANC <1->1.1  No sepsis criteria/acidosis  Possible hemodilution; stop fluids and follow  Bone marrow bx reordered  Hematology consult;   Hx of ETOH abuse in the past  Patient's bone marrow biopsy report showing no significant signs of primary hematological disorder for pancytopenia.  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      Hematologist recommend alcohol abstinence    Abdominal pain  Assessment & Plan  CT A/P ok  Likely viral etiology  Currently resolved      Diarrhea of presumed infectious origin  Assessment & Plan  Patient complains of diarrhea x1 week.  Is diffuse constitutional symptoms would imply possibly viral origin.  He complains of chills myalgias arthralgias and generally just feeling poorly.  He has not had any fever, there is been no blood,   He has had no recent antibiotic exposures in the last 6 months, he cannot recall in fact the last time he did have any antibiotics.  Check stool O&P and cultures  Repeat labs in the a.m. w leukopenia  Abdominal pain persistent  C-diff neg  CT A/P - ok  Cipro/Flagyl stopped as no evidence of colitis    Currently resolved      Thrombocytopenia (HCC)- (present on admission)  Assessment & Plan  Platelets chronically low  Onc c/s   Follow  Likely d/t ETOH abuse  Monitor for active bleeding  Transfuse if <25    Patient's bone marrow biopsy report showing no significant signs of primary hematological disorder for pancytopenia.  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      Lumbar back pain with radiculopathy affecting lower extremity- (present on admission)  Assessment &  Plan  Patient has chronic back pain.  We will treat him supportively.    Gout- (present on admission)  Assessment & Plan  Stable at this time       VTE prophylaxis: Heparin      Current Facility-Administered Medications:   •  thiamine tablet 100 mg, 100 mg, Oral, DAILY, Jose Shelton A.P.N., 100 mg at 10/04/19 0542  •  folic acid (FOLVITE) tablet 1 mg, 1 mg, Oral, DAILY, Zahida Longo M.D., 1 mg at 10/04/19 0542  •  multivitamin (THERAGRAN) tablet 1 Tab, 1 Tab, Oral, DAILY, Zahida Longo M.D., 1 Tab at 10/04/19 0542  •  DULoxetine (CYMBALTA) capsule 40 mg, 40 mg, Oral, QAM, Bina Zayas M.D., 40 mg at 10/04/19 0542  •  QUEtiapine (SEROQUEL) tablet 200 mg, 200 mg, Oral, Q EVENING, Bina Zayas M.D., 200 mg at 10/03/19 1754  •  traZODone (DESYREL) tablet 100 mg, 100 mg, Oral, QHS, Chad Romo, D.O., 100 mg at 10/03/19 2132  •  gabapentin (NEURONTIN) capsule 300 mg, 300 mg, Oral, TID, Chad Romo, D.O., 300 mg at 10/04/19 0543  •  acetaminophen (TYLENOL) tablet 650 mg, 650 mg, Oral, Q6HRS PRN, Chad Romo, D.O., 650 mg at 10/03/19 0411  •  heparin injection 5,000 Units, 5,000 Units, Subcutaneous, Q8HRS, Chad Romo D.O., 5,000 Units at 10/04/19 0541  •  ondansetron (ZOFRAN) syringe/vial injection 4 mg, 4 mg, Intravenous, Q4HRS PRN, Chad Romo D.O.  •  ondansetron (ZOFRAN ODT) dispertab 4 mg, 4 mg, Oral, Q4HRS PRN, Chad Romo D.O.  •  loperamide (IMODIUM) capsule 2 mg, 2 mg, Oral, TID PRN, Chad Romo D.O., 2 mg at 09/29/19 0909  •  Influenza Vaccine High-Dose pf injection 0.5 mL, 0.5 mL, Intramuscular, Once PRN, Raimundo Us D.O.         150 154

## 2019-10-04 NOTE — REVIEW OF SYSTEMS
[Joint Pain] : joint pain [see HPI] : see HPI [Under Stress] : under stress [Depression] : depression [Anxiety] : anxiety [Negative] : Heme/Lymph [Fever] : no fever [Chills] : no chills [Recent Weight Gain (___ Lbs)] : no recent weight gain [Feeling Fatigued] : not feeling fatigued [Shortness Of Breath] : no shortness of breath [Recent Weight Loss (___ Lbs)] : no recent weight loss [Chest  Pressure] : no chest pressure [Dyspnea on exertion] : not dyspnea during exertion [Chest Pain] : no chest pain [Lower Ext Edema] : no extremity edema [Palpitations] : no palpitations [Abdominal Pain] : no abdominal pain [Change In The Stool] : no change in stool [Heartburn] : no heartburn [Change In Color Of Skin] : change in skin color [Urinary Frequency] : no change in urinary frequency [Dizziness] : no dizziness [Skin Lesions] : no skin lesions [Confusion] : no confusion was observed [Numbness (Hypesthesia)] : no numbness [Suicidal] : not suicidal

## 2019-10-04 NOTE — PHYSICAL EXAM
[General Appearance - Well Developed] : well developed [Normal Appearance] : normal appearance [Well Groomed] : well groomed [No Deformities] : no deformities [General Appearance - Well Nourished] : well nourished [General Appearance - In No Acute Distress] : no acute distress [Normal Conjunctiva] : the conjunctiva exhibited no abnormalities [Eyelids - No Xanthelasma] : the eyelids demonstrated no xanthelasmas [No Oral Pallor] : no oral pallor [Normal Oral Mucosa] : normal oral mucosa [Normal Jugular Venous V Waves Present] : normal jugular venous V waves present [Normal Jugular Venous A Waves Present] : normal jugular venous A waves present [No Oral Cyanosis] : no oral cyanosis [No Jugular Venous Rabago A Waves] : no jugular venous rabago A waves [Exaggerated Use Of Accessory Muscles For Inspiration] : no accessory muscle use [Auscultation Breath Sounds / Voice Sounds] : lungs were clear to auscultation bilaterally [Respiration, Rhythm And Depth] : normal respiratory rhythm and effort [Heart Sounds] : normal S1 and S2 [Murmurs] : no murmurs present [Heart Rate And Rhythm] : heart rate and rhythm were normal [Abdomen Soft] : soft [Abdomen Tenderness] : non-tender [Abdomen Mass (___ Cm)] : no abdominal mass palpated [Nail Clubbing] : no clubbing of the fingernails [Cyanosis, Localized] : no localized cyanosis [Petechial Hemorrhages (___cm)] : no petechial hemorrhages [Skin Color & Pigmentation] : normal skin color and pigmentation [No Venous Stasis] : no venous stasis [Skin Turgor] : normal skin turgor [] : no rash [No Skin Ulcers] : no skin ulcer [Skin Lesions] : no skin lesions [No Xanthoma] : no  xanthoma was observed [Affect] : the affect was normal [Mood] : the mood was normal [Oriented To Time, Place, And Person] : oriented to person, place, and time [No Anxiety] : not feeling anxious [FreeTextEntry1] : Frustrated with current lifestyle limitations, but seems alert and oriented

## 2019-10-08 ENCOUNTER — MEDICATION RENEWAL (OUTPATIENT)
Age: 84
End: 2019-10-08

## 2019-11-14 ENCOUNTER — MEDICATION RENEWAL (OUTPATIENT)
Age: 84
End: 2019-11-14

## 2019-12-09 ENCOUNTER — MEDICATION RENEWAL (OUTPATIENT)
Age: 84
End: 2019-12-09

## 2020-01-01 ENCOUNTER — RX RENEWAL (OUTPATIENT)
Age: 85
End: 2020-01-01

## 2020-01-01 ENCOUNTER — APPOINTMENT (OUTPATIENT)
Dept: HOME HEALTH SERVICES | Facility: HOME HEALTH | Age: 85
End: 2020-01-01

## 2020-01-01 ENCOUNTER — NON-APPOINTMENT (OUTPATIENT)
Age: 85
End: 2020-01-01

## 2020-01-01 ENCOUNTER — APPOINTMENT (OUTPATIENT)
Dept: HOME HEALTH SERVICES | Facility: HOME HEALTH | Age: 85
End: 2020-01-01
Payer: MEDICARE

## 2020-01-01 ENCOUNTER — TRANSCRIPTION ENCOUNTER (OUTPATIENT)
Age: 85
End: 2020-01-01

## 2020-01-01 DIAGNOSIS — I48.92 UNSPECIFIED ATRIAL FLUTTER: ICD-10-CM

## 2020-01-01 DIAGNOSIS — R13.10 DYSPHAGIA, UNSPECIFIED: ICD-10-CM

## 2020-01-01 DIAGNOSIS — G47.00 INSOMNIA, UNSPECIFIED: ICD-10-CM

## 2020-01-01 DIAGNOSIS — I25.10 ATHEROSCLEROTIC HEART DISEASE OF NATIVE CORONARY ARTERY W/OUT ANGINA PECTORIS: ICD-10-CM

## 2020-01-01 DIAGNOSIS — C61 MALIGNANT NEOPLASM OF PROSTATE: ICD-10-CM

## 2020-01-01 DIAGNOSIS — L98.499 NON-PRESSURE CHRONIC ULCER OF SKIN OF OTHER SITES WITH UNSPECIFIED SEVERITY: ICD-10-CM

## 2020-01-01 DIAGNOSIS — R26.81 UNSTEADINESS ON FEET: ICD-10-CM

## 2020-01-01 DIAGNOSIS — Z87.01 PERSONAL HISTORY OF PNEUMONIA (RECURRENT): ICD-10-CM

## 2020-01-01 DIAGNOSIS — I10 ESSENTIAL (PRIMARY) HYPERTENSION: ICD-10-CM

## 2020-01-01 DIAGNOSIS — F03.91 UNSPECIFIED DEMENTIA WITH BEHAVIORAL DISTURBANCE: ICD-10-CM

## 2020-01-01 PROCEDURE — 99214 OFFICE O/P EST MOD 30 MIN: CPT | Mod: 95

## 2020-01-01 RX ORDER — BICALUTAMIDE 50 MG/1
50 TABLET ORAL DAILY
Refills: 0 | Status: ACTIVE | COMMUNITY
Start: 2019-05-08

## 2020-01-01 RX ORDER — LEVOFLOXACIN 750 MG/1
750 TABLET, FILM COATED ORAL DAILY
Qty: 5 | Refills: 0 | Status: COMPLETED | COMMUNITY
Start: 2020-01-01 | End: 2020-01-01

## 2020-01-01 RX ORDER — QUETIAPINE FUMARATE 25 MG/1
25 TABLET ORAL
Refills: 0 | Status: ACTIVE | COMMUNITY
Start: 2019-07-22

## 2020-01-01 RX ORDER — OMEPRAZOLE 20 MG/1
20 CAPSULE, DELAYED RELEASE ORAL
Qty: 30 | Refills: 0 | Status: DISCONTINUED | COMMUNITY
Start: 2020-01-22

## 2020-01-01 RX ORDER — TAMSULOSIN HYDROCHLORIDE 0.4 MG/1
0.4 CAPSULE ORAL
Qty: 30 | Refills: 2 | Status: ACTIVE | COMMUNITY
Start: 2018-12-13

## 2020-01-01 RX ORDER — SULFAMETHOXAZOLE AND TRIMETHOPRIM 800; 160 MG/1; MG/1
800-160 TABLET ORAL
Qty: 10 | Refills: 0 | Status: DISCONTINUED | COMMUNITY
Start: 2020-01-21

## 2020-01-01 RX ORDER — GABAPENTIN 300 MG/1
300 CAPSULE ORAL
Refills: 0 | Status: ACTIVE | COMMUNITY
Start: 2019-04-08

## 2020-01-01 RX ORDER — CEPHALEXIN 500 MG/1
500 CAPSULE ORAL
Qty: 20 | Refills: 0 | Status: DISCONTINUED | COMMUNITY
Start: 2020-01-27

## 2020-01-10 ENCOUNTER — INPATIENT (INPATIENT)
Facility: HOSPITAL | Age: 85
LOS: 1 days | Discharge: HOME | DRG: 153 | End: 2020-01-12
Attending: INTERNAL MEDICINE | Admitting: INTERNAL MEDICINE
Payer: MEDICARE

## 2020-01-10 VITALS
RESPIRATION RATE: 19 BRPM | OXYGEN SATURATION: 96 % | HEART RATE: 72 BPM | DIASTOLIC BLOOD PRESSURE: 56 MMHG | SYSTOLIC BLOOD PRESSURE: 114 MMHG

## 2020-01-10 DIAGNOSIS — J18.9 PNEUMONIA, UNSPECIFIED ORGANISM: ICD-10-CM

## 2020-01-10 LAB
ALBUMIN SERPL ELPH-MCNC: 3.8 G/DL — SIGNIFICANT CHANGE UP (ref 3.3–5)
ALP SERPL-CCNC: 91 U/L — SIGNIFICANT CHANGE UP (ref 40–120)
ALT FLD-CCNC: 24 U/L — SIGNIFICANT CHANGE UP (ref 10–45)
ANION GAP SERPL CALC-SCNC: 9 MMOL/L — SIGNIFICANT CHANGE UP (ref 5–17)
APPEARANCE UR: CLEAR — SIGNIFICANT CHANGE UP
APTT BLD: 32.6 SEC — SIGNIFICANT CHANGE UP (ref 27.5–36.3)
AST SERPL-CCNC: 26 U/L — SIGNIFICANT CHANGE UP (ref 10–40)
BACTERIA # UR AUTO: NEGATIVE — SIGNIFICANT CHANGE UP
BASE EXCESS BLDV CALC-SCNC: 3.4 MMOL/L — HIGH (ref -2–2)
BASOPHILS # BLD AUTO: 0.03 K/UL — SIGNIFICANT CHANGE UP (ref 0–0.2)
BASOPHILS NFR BLD AUTO: 0.5 % — SIGNIFICANT CHANGE UP (ref 0–2)
BILIRUB SERPL-MCNC: 0.9 MG/DL — SIGNIFICANT CHANGE UP (ref 0.2–1.2)
BILIRUB UR-MCNC: NEGATIVE — SIGNIFICANT CHANGE UP
BUN SERPL-MCNC: 27 MG/DL — HIGH (ref 7–23)
CA-I SERPL-SCNC: 1.21 MMOL/L — SIGNIFICANT CHANGE UP (ref 1.12–1.3)
CALCIUM SERPL-MCNC: 9.6 MG/DL — SIGNIFICANT CHANGE UP (ref 8.4–10.5)
CHLORIDE BLDV-SCNC: 105 MMOL/L — SIGNIFICANT CHANGE UP (ref 96–108)
CHLORIDE SERPL-SCNC: 101 MMOL/L — SIGNIFICANT CHANGE UP (ref 96–108)
CO2 BLDV-SCNC: 32 MMOL/L — HIGH (ref 22–30)
CO2 SERPL-SCNC: 27 MMOL/L — SIGNIFICANT CHANGE UP (ref 22–31)
COLOR SPEC: YELLOW — SIGNIFICANT CHANGE UP
CREAT SERPL-MCNC: 1.15 MG/DL — SIGNIFICANT CHANGE UP (ref 0.5–1.3)
DIFF PNL FLD: NEGATIVE — SIGNIFICANT CHANGE UP
EOSINOPHIL # BLD AUTO: 0.08 K/UL — SIGNIFICANT CHANGE UP (ref 0–0.5)
EOSINOPHIL NFR BLD AUTO: 1.4 % — SIGNIFICANT CHANGE UP (ref 0–6)
EPI CELLS # UR: 1 /HPF — SIGNIFICANT CHANGE UP
GAS PNL BLDV: 136 MMOL/L — SIGNIFICANT CHANGE UP (ref 135–145)
GAS PNL BLDV: SIGNIFICANT CHANGE UP
GAS PNL BLDV: SIGNIFICANT CHANGE UP
GLUCOSE BLDV-MCNC: 91 MG/DL — SIGNIFICANT CHANGE UP (ref 70–99)
GLUCOSE SERPL-MCNC: 95 MG/DL — SIGNIFICANT CHANGE UP (ref 70–99)
GLUCOSE UR QL: NEGATIVE — SIGNIFICANT CHANGE UP
HCO3 BLDV-SCNC: 30 MMOL/L — HIGH (ref 21–29)
HCOV PNL SPEC NAA+PROBE: DETECTED
HCT VFR BLD CALC: 40.2 % — SIGNIFICANT CHANGE UP (ref 39–50)
HCT VFR BLDA CALC: 40 % — SIGNIFICANT CHANGE UP (ref 39–50)
HGB BLD CALC-MCNC: 13.2 G/DL — SIGNIFICANT CHANGE UP (ref 13–17)
HGB BLD-MCNC: 12.8 G/DL — LOW (ref 13–17)
HYALINE CASTS # UR AUTO: 1 /LPF — SIGNIFICANT CHANGE UP (ref 0–2)
IMM GRANULOCYTES NFR BLD AUTO: 0.5 % — SIGNIFICANT CHANGE UP (ref 0–1.5)
INR BLD: 1.03 RATIO — SIGNIFICANT CHANGE UP (ref 0.88–1.16)
KETONES UR-MCNC: NEGATIVE — SIGNIFICANT CHANGE UP
LACTATE BLDV-MCNC: 1.5 MMOL/L — SIGNIFICANT CHANGE UP (ref 0.7–2)
LEUKOCYTE ESTERASE UR-ACNC: NEGATIVE — SIGNIFICANT CHANGE UP
LYMPHOCYTES # BLD AUTO: 0.8 K/UL — LOW (ref 1–3.3)
LYMPHOCYTES # BLD AUTO: 14 % — SIGNIFICANT CHANGE UP (ref 13–44)
MCHC RBC-ENTMCNC: 29.7 PG — SIGNIFICANT CHANGE UP (ref 27–34)
MCHC RBC-ENTMCNC: 31.8 GM/DL — LOW (ref 32–36)
MCV RBC AUTO: 93.3 FL — SIGNIFICANT CHANGE UP (ref 80–100)
MONOCYTES # BLD AUTO: 0.72 K/UL — SIGNIFICANT CHANGE UP (ref 0–0.9)
MONOCYTES NFR BLD AUTO: 12.6 % — SIGNIFICANT CHANGE UP (ref 2–14)
NEUTROPHILS # BLD AUTO: 4.07 K/UL — SIGNIFICANT CHANGE UP (ref 1.8–7.4)
NEUTROPHILS NFR BLD AUTO: 71 % — SIGNIFICANT CHANGE UP (ref 43–77)
NITRITE UR-MCNC: NEGATIVE — SIGNIFICANT CHANGE UP
NRBC # BLD: 0 /100 WBCS — SIGNIFICANT CHANGE UP (ref 0–0)
PCO2 BLDV: 56 MMHG — HIGH (ref 35–50)
PH BLDV: 7.35 — SIGNIFICANT CHANGE UP (ref 7.35–7.45)
PH UR: 6.5 — SIGNIFICANT CHANGE UP (ref 5–8)
PLATELET # BLD AUTO: 115 K/UL — LOW (ref 150–400)
PO2 BLDV: 25 MMHG — SIGNIFICANT CHANGE UP (ref 25–45)
POTASSIUM BLDV-SCNC: 4.7 MMOL/L — SIGNIFICANT CHANGE UP (ref 3.5–5.3)
POTASSIUM SERPL-MCNC: 4.5 MMOL/L — SIGNIFICANT CHANGE UP (ref 3.5–5.3)
POTASSIUM SERPL-SCNC: 4.5 MMOL/L — SIGNIFICANT CHANGE UP (ref 3.5–5.3)
PROT SERPL-MCNC: 7.7 G/DL — SIGNIFICANT CHANGE UP (ref 6–8.3)
PROT UR-MCNC: ABNORMAL
PROTHROM AB SERPL-ACNC: 11.8 SEC — SIGNIFICANT CHANGE UP (ref 10–12.9)
RAPID RVP RESULT: DETECTED
RBC # BLD: 4.31 M/UL — SIGNIFICANT CHANGE UP (ref 4.2–5.8)
RBC # FLD: 13.4 % — SIGNIFICANT CHANGE UP (ref 10.3–14.5)
RBC CASTS # UR COMP ASSIST: 6 /HPF — HIGH (ref 0–4)
SAO2 % BLDV: 37 % — LOW (ref 67–88)
SODIUM SERPL-SCNC: 137 MMOL/L — SIGNIFICANT CHANGE UP (ref 135–145)
SP GR SPEC: 1.02 — SIGNIFICANT CHANGE UP (ref 1.01–1.02)
UROBILINOGEN FLD QL: ABNORMAL
WBC # BLD: 5.73 K/UL — SIGNIFICANT CHANGE UP (ref 3.8–10.5)
WBC # FLD AUTO: 5.73 K/UL — SIGNIFICANT CHANGE UP (ref 3.8–10.5)
WBC UR QL: 1 /HPF — SIGNIFICANT CHANGE UP (ref 0–5)

## 2020-01-10 PROCEDURE — 71250 CT THORAX DX C-: CPT | Mod: 26

## 2020-01-10 PROCEDURE — 93010 ELECTROCARDIOGRAM REPORT: CPT | Mod: GC

## 2020-01-10 PROCEDURE — 99291 CRITICAL CARE FIRST HOUR: CPT | Mod: GC

## 2020-01-10 PROCEDURE — 71045 X-RAY EXAM CHEST 1 VIEW: CPT | Mod: 26

## 2020-01-10 RX ORDER — MULTIVIT-MIN/FERROUS GLUCONATE 9 MG/15 ML
2 LIQUID (ML) ORAL
Qty: 0 | Refills: 0 | DISCHARGE

## 2020-01-10 RX ORDER — AZITHROMYCIN 500 MG/1
500 TABLET, FILM COATED ORAL ONCE
Refills: 0 | Status: COMPLETED | OUTPATIENT
Start: 2020-01-10 | End: 2020-01-10

## 2020-01-10 RX ORDER — ATORVASTATIN CALCIUM 80 MG/1
20 TABLET, FILM COATED ORAL AT BEDTIME
Refills: 0 | Status: DISCONTINUED | OUTPATIENT
Start: 2020-01-10 | End: 2020-01-12

## 2020-01-10 RX ORDER — SODIUM CHLORIDE 9 MG/ML
2000 INJECTION, SOLUTION INTRAVENOUS ONCE
Refills: 0 | Status: COMPLETED | OUTPATIENT
Start: 2020-01-10 | End: 2020-01-10

## 2020-01-10 RX ORDER — QUETIAPINE FUMARATE 200 MG/1
2 TABLET, FILM COATED ORAL
Qty: 0 | Refills: 0 | DISCHARGE

## 2020-01-10 RX ORDER — QUETIAPINE FUMARATE 200 MG/1
1 TABLET, FILM COATED ORAL
Qty: 0 | Refills: 0 | DISCHARGE

## 2020-01-10 RX ORDER — LANOLIN ALCOHOL/MO/W.PET/CERES
3 CREAM (GRAM) TOPICAL AT BEDTIME
Refills: 0 | Status: DISCONTINUED | OUTPATIENT
Start: 2020-01-10 | End: 2020-01-12

## 2020-01-10 RX ORDER — HEPARIN SODIUM 5000 [USP'U]/ML
5000 INJECTION INTRAVENOUS; SUBCUTANEOUS EVERY 12 HOURS
Refills: 0 | Status: DISCONTINUED | OUTPATIENT
Start: 2020-01-10 | End: 2020-01-12

## 2020-01-10 RX ORDER — FONDAPARINUX SODIUM 2.5 MG/.5ML
1 INJECTION, SOLUTION SUBCUTANEOUS
Qty: 0 | Refills: 0 | DISCHARGE

## 2020-01-10 RX ORDER — CEFTRIAXONE 500 MG/1
1000 INJECTION, POWDER, FOR SOLUTION INTRAMUSCULAR; INTRAVENOUS ONCE
Refills: 0 | Status: COMPLETED | OUTPATIENT
Start: 2020-01-10 | End: 2020-01-10

## 2020-01-10 RX ORDER — BICALUTAMIDE 50 MG/1
1 TABLET, FILM COATED ORAL
Qty: 0 | Refills: 0 | DISCHARGE

## 2020-01-10 RX ORDER — SODIUM CHLORIDE 9 MG/ML
1000 INJECTION INTRAMUSCULAR; INTRAVENOUS; SUBCUTANEOUS
Refills: 0 | Status: DISCONTINUED | OUTPATIENT
Start: 2020-01-10 | End: 2020-01-12

## 2020-01-10 RX ORDER — TAMSULOSIN HYDROCHLORIDE 0.4 MG/1
2 CAPSULE ORAL
Qty: 0 | Refills: 0 | DISCHARGE

## 2020-01-10 RX ORDER — CLOPIDOGREL BISULFATE 75 MG/1
75 TABLET, FILM COATED ORAL DAILY
Refills: 0 | Status: DISCONTINUED | OUTPATIENT
Start: 2020-01-10 | End: 2020-01-10

## 2020-01-10 RX ORDER — QUETIAPINE FUMARATE 200 MG/1
25 TABLET, FILM COATED ORAL AT BEDTIME
Refills: 0 | Status: DISCONTINUED | OUTPATIENT
Start: 2020-01-10 | End: 2020-01-12

## 2020-01-10 RX ORDER — TAMSULOSIN HYDROCHLORIDE 0.4 MG/1
1 CAPSULE ORAL
Qty: 0 | Refills: 0 | DISCHARGE

## 2020-01-10 RX ORDER — ATORVASTATIN CALCIUM 80 MG/1
1 TABLET, FILM COATED ORAL
Qty: 0 | Refills: 0 | DISCHARGE

## 2020-01-10 RX ORDER — TAMSULOSIN HYDROCHLORIDE 0.4 MG/1
0.4 CAPSULE ORAL AT BEDTIME
Refills: 0 | Status: DISCONTINUED | OUTPATIENT
Start: 2020-01-10 | End: 2020-01-10

## 2020-01-10 RX ORDER — GABAPENTIN 400 MG/1
2 CAPSULE ORAL
Qty: 0 | Refills: 0 | DISCHARGE

## 2020-01-10 RX ORDER — ATENOLOL 25 MG/1
0.5 TABLET ORAL
Qty: 0 | Refills: 0 | DISCHARGE

## 2020-01-10 RX ORDER — GABAPENTIN 400 MG/1
300 CAPSULE ORAL AT BEDTIME
Refills: 0 | Status: DISCONTINUED | OUTPATIENT
Start: 2020-01-10 | End: 2020-01-12

## 2020-01-10 RX ORDER — PANTOPRAZOLE SODIUM 20 MG/1
40 TABLET, DELAYED RELEASE ORAL
Refills: 0 | Status: DISCONTINUED | OUTPATIENT
Start: 2020-01-10 | End: 2020-01-12

## 2020-01-10 RX ORDER — MULTIVIT-MIN/FERROUS GLUCONATE 9 MG/15 ML
1 LIQUID (ML) ORAL
Qty: 0 | Refills: 0 | DISCHARGE

## 2020-01-10 RX ORDER — LANOLIN ALCOHOL/MO/W.PET/CERES
1 CREAM (GRAM) TOPICAL
Qty: 0 | Refills: 0 | DISCHARGE

## 2020-01-10 RX ORDER — BICALUTAMIDE 50 MG/1
50 TABLET, FILM COATED ORAL DAILY
Refills: 0 | Status: DISCONTINUED | OUTPATIENT
Start: 2020-01-10 | End: 2020-01-12

## 2020-01-10 RX ORDER — ASPIRIN/CALCIUM CARB/MAGNESIUM 324 MG
1 TABLET ORAL
Qty: 0 | Refills: 0 | DISCHARGE

## 2020-01-10 RX ORDER — ASPIRIN/CALCIUM CARB/MAGNESIUM 324 MG
81 TABLET ORAL DAILY
Refills: 0 | Status: DISCONTINUED | OUTPATIENT
Start: 2020-01-10 | End: 2020-01-12

## 2020-01-10 RX ORDER — CHOLECALCIFEROL (VITAMIN D3) 125 MCG
1 CAPSULE ORAL
Qty: 0 | Refills: 0 | DISCHARGE

## 2020-01-10 RX ORDER — DOCUSATE SODIUM 100 MG
2 CAPSULE ORAL
Qty: 0 | Refills: 0 | DISCHARGE

## 2020-01-10 RX ORDER — GABAPENTIN 400 MG/1
300 CAPSULE ORAL
Refills: 0 | Status: DISCONTINUED | OUTPATIENT
Start: 2020-01-10 | End: 2020-01-10

## 2020-01-10 RX ADMIN — ATORVASTATIN CALCIUM 20 MILLIGRAM(S): 80 TABLET, FILM COATED ORAL at 22:15

## 2020-01-10 RX ADMIN — QUETIAPINE FUMARATE 25 MILLIGRAM(S): 200 TABLET, FILM COATED ORAL at 23:39

## 2020-01-10 RX ADMIN — GABAPENTIN 300 MILLIGRAM(S): 400 CAPSULE ORAL at 22:15

## 2020-01-10 RX ADMIN — CEFTRIAXONE 100 MILLIGRAM(S): 500 INJECTION, POWDER, FOR SOLUTION INTRAMUSCULAR; INTRAVENOUS at 16:00

## 2020-01-10 RX ADMIN — SODIUM CHLORIDE 75 MILLILITER(S): 9 INJECTION INTRAMUSCULAR; INTRAVENOUS; SUBCUTANEOUS at 22:18

## 2020-01-10 RX ADMIN — Medication 3 MILLIGRAM(S): at 22:15

## 2020-01-10 RX ADMIN — SODIUM CHLORIDE 2000 MILLILITER(S): 9 INJECTION, SOLUTION INTRAVENOUS at 14:00

## 2020-01-10 RX ADMIN — AZITHROMYCIN 250 MILLIGRAM(S): 500 TABLET, FILM COATED ORAL at 16:01

## 2020-01-10 NOTE — H&P ADULT - NSHPLABSRESULTS_GEN_ALL_CORE
12.8   5.73  )-----------( 115      ( 10 Toy 2020 14:12 )             40.2       01-10    137  |  101  |  27<H>  ----------------------------<  95  4.5   |  27  |  1.15    Ca    9.6      10 Toy 2020 14:12    TPro  7.7  /  Alb  3.8  /  TBili  0.9  /  DBili  x   /  AST  26  /  ALT  24  /  AlkPhos  91  01-10              Urinalysis Basic - ( 10 Toy 2020 15:22 )    Color: Yellow / Appearance: Clear / S.025 / pH: x  Gluc: x / Ketone: Negative  / Bili: Negative / Urobili: 2 mg/dL   Blood: x / Protein: Trace / Nitrite: Negative   Leuk Esterase: Negative / RBC: 6 /hpf / WBC 1 /HPF   Sq Epi: x / Non Sq Epi: 1 /hpf / Bacteria: Negative        PT/INR - ( 10 Toy 2020 14:12 )   PT: 11.8 sec;   INR: 1.03 ratio         PTT - ( 10 Toy 2020 14:12 )  PTT:32.6 sec    Lactate Trend            CAPILLARY BLOOD GLUCOSE

## 2020-01-10 NOTE — ED PROVIDER NOTE - PHYSICAL EXAMINATION
VITALS: reviewed  GEN: NAD, A & O x 2 (person and place)  HEAD/EYES: NCAT, PERRL, EOMI, anicteric sclerae, no conjunctival pallor  ENT: mucus membranes dry, oropharynx WNL, trachea midline  RESP: lungs CTA with equal breath sounds bilaterally, chest wall nontender and atraumatic  CV: heart with reg rhythm S1, S2, distal pulses intact and symmetric bilaterally  ABDOMEN: soft, nondistended, nontender, no palpable masses  : no CVAT  MSK: extremities atraumatic and nontender, no edema, no asymmetry.   SKIN: warm, dry, no rash, no bruising, no cyanosis. color appropriate for ethnicity  NEURO: alert, mentating appropriately, no facial asymmetry. gross sensation, motor, coordination are intact  PSYCH: Affect appropriate

## 2020-01-10 NOTE — H&P ADULT - ASSESSMENT
pt /w viral syndrome   ? aspiration   family states chokes w/ fluids  thickened liquids  swallow eval  ct chest   given abs in er   pulm / id eval  c/w meds   dvt proph  pt   mild iv fluids   discussed w./ daughters at bedside

## 2020-01-10 NOTE — H&P ADULT - NSICDXPASTMEDICALHX_GEN_ALL_CORE_FT
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

## 2020-01-10 NOTE — ED ADULT NURSE NOTE - NSIMPLEMENTINTERV_GEN_ALL_ED
Implemented All Fall with Harm Risk Interventions:  [place] to call system. Call bell, personal items and telephone within reach. Instruct patient to call for assistance. Room bathroom lighting operational. Non-slip footwear when patient is off stretcher. Physically safe environment: no spills, clutter or unnecessary equipment. Stretcher in lowest position, wheels locked, appropriate side rails in place. Provide visual cue, wrist band, yellow gown, etc. Monitor gait and stability. Monitor for mental status changes and reorient to person, place, and time. Review medications for side effects contributing to fall risk. Reinforce activity limits and safety measures with patient and family. Provide visual clues: red socks.

## 2020-01-10 NOTE — ED ADULT NURSE REASSESSMENT NOTE - NS ED NURSE REASSESS COMMENT FT1
Pt resting comfortably in bed. A&O x2. Denies needs at this time. VS as documented. Family at bedside. Admitted to Medicine. Awaiting bed upstairs. Comfort and safety measures maintained

## 2020-01-10 NOTE — ED PROVIDER NOTE - ATTENDING CONTRIBUTION TO CARE
89yr M hx of htn hl, atrial flutter on rivaroxaban p/w AMS since last night. pt had URI symptoms for several days however patient was not able to sleep and daughter gave a new dose of ativan. unable to wake him up here. upon arrival patient was obtunted but gradually improved and per family is back to base line.   exam notable for eyes open to voice, alert and oriented otherwise. s1s2, clear lungs, soft abd. no ext signs of trauma.  concern for infectious vs medication induced AMS. given rapid improvement, CThead is not warranted at this time.   patient will be admitted after work up to ensure resolution.

## 2020-01-10 NOTE — ED ADULT NURSE NOTE - OBJECTIVE STATEMENT
89 yo male presents in the ed for AMS- pt is alert and oriented x2 speaking coherently on arrival. Pt family states that he was unarousable for them this morning. Pt family states that he has a cough for a few days. Pt denies chest pain, fever, chills, nausea, vomiting. MD at bedside, EKG done. will continue to reassess.

## 2020-01-10 NOTE — H&P ADULT - NEUROLOGICAL DETAILS
normal strength/sensation intact/deep reflexes intact/cranial nerves intact/responds to verbal commands/responds to pain

## 2020-01-10 NOTE — ED PROVIDER NOTE - CLINICAL SUMMARY MEDICAL DECISION MAKING FREE TEXT BOX
89 yo M presenting with altered mental status and decreased responsiveness, patient with tactile temperature in setting of known URI symptoms, improved responsiveness in ED and now alert and oriented at his baseline (per family), will obtain sepsis labs, tx with ivf as patient appears dry, r/o pna, r/o uti. tba

## 2020-01-10 NOTE — ED PROVIDER NOTE - OBJECTIVE STATEMENT
89 yo M hx HTN, HLD, atrial fibrillation on Xarelto, brought in by family for altered mental status and decreased responsiveness since this morning. Patient with several days of cough and URI symptoms, difficulty sleeping, so he took 0.5 mg Ativan (old prescription, which he has not used in a long time), and he was difficult to arouse this morning. As per EMS, not responding to voice or sternal rub, but with normal vital signs. Patient initially not responding in ED to voice, but then perked up and was able to answer questions.

## 2020-01-10 NOTE — H&P ADULT - HISTORY OF PRESENT ILLNESS
89 yo Male w / hx HTN, HLD, atrial fibrillation /?dementia /  brought in by family for altered mental status and decreased responsiveness and cough    Patient with several days of cough and URI symptoms, difficulty sleeping, so he took 0.5 mg Ativan (old prescription, which he has not used in a long time), and he was difficult to arouse this morning.  pt now more awake and alert

## 2020-01-10 NOTE — ED PROVIDER NOTE - PMH
Atrial fibrillation    Cancer of prostate    Dyslipidemia    Dyslipidemia    HLD (hyperlipidemia)    HTN (hypertension)    MI, old  1986 & 1997

## 2020-01-11 DIAGNOSIS — R13.10 DYSPHAGIA, UNSPECIFIED: ICD-10-CM

## 2020-01-11 LAB
ANION GAP SERPL CALC-SCNC: 10 MMOL/L — SIGNIFICANT CHANGE UP (ref 5–17)
BUN SERPL-MCNC: 19 MG/DL — SIGNIFICANT CHANGE UP (ref 7–23)
CALCIUM SERPL-MCNC: 9 MG/DL — SIGNIFICANT CHANGE UP (ref 8.4–10.5)
CHLORIDE SERPL-SCNC: 104 MMOL/L — SIGNIFICANT CHANGE UP (ref 96–108)
CO2 SERPL-SCNC: 26 MMOL/L — SIGNIFICANT CHANGE UP (ref 22–31)
CREAT SERPL-MCNC: 0.96 MG/DL — SIGNIFICANT CHANGE UP (ref 0.5–1.3)
CULTURE RESULTS: SIGNIFICANT CHANGE UP
GLUCOSE SERPL-MCNC: 74 MG/DL — SIGNIFICANT CHANGE UP (ref 70–99)
HCT VFR BLD CALC: 36.8 % — LOW (ref 39–50)
HGB BLD-MCNC: 11.9 G/DL — LOW (ref 13–17)
MCHC RBC-ENTMCNC: 30.3 PG — SIGNIFICANT CHANGE UP (ref 27–34)
MCHC RBC-ENTMCNC: 32.3 GM/DL — SIGNIFICANT CHANGE UP (ref 32–36)
MCV RBC AUTO: 93.6 FL — SIGNIFICANT CHANGE UP (ref 80–100)
PLATELET # BLD AUTO: 113 K/UL — LOW (ref 150–400)
POTASSIUM SERPL-MCNC: 3.7 MMOL/L — SIGNIFICANT CHANGE UP (ref 3.5–5.3)
POTASSIUM SERPL-SCNC: 3.7 MMOL/L — SIGNIFICANT CHANGE UP (ref 3.5–5.3)
PROCALCITONIN SERPL-MCNC: 0.07 NG/ML — SIGNIFICANT CHANGE UP (ref 0.02–0.1)
RBC # BLD: 3.93 M/UL — LOW (ref 4.2–5.8)
RBC # FLD: 13.2 % — SIGNIFICANT CHANGE UP (ref 10.3–14.5)
SODIUM SERPL-SCNC: 140 MMOL/L — SIGNIFICANT CHANGE UP (ref 135–145)
SPECIMEN SOURCE: SIGNIFICANT CHANGE UP
WBC # BLD: 4.32 K/UL — SIGNIFICANT CHANGE UP (ref 3.8–10.5)
WBC # FLD AUTO: 4.32 K/UL — SIGNIFICANT CHANGE UP (ref 3.8–10.5)

## 2020-01-11 PROCEDURE — 99222 1ST HOSP IP/OBS MODERATE 55: CPT

## 2020-01-11 PROCEDURE — 93010 ELECTROCARDIOGRAM REPORT: CPT

## 2020-01-11 RX ORDER — QUETIAPINE FUMARATE 200 MG/1
25 TABLET, FILM COATED ORAL ONCE
Refills: 0 | Status: COMPLETED | OUTPATIENT
Start: 2020-01-11 | End: 2020-01-11

## 2020-01-11 RX ADMIN — QUETIAPINE FUMARATE 25 MILLIGRAM(S): 200 TABLET, FILM COATED ORAL at 06:57

## 2020-01-11 RX ADMIN — QUETIAPINE FUMARATE 25 MILLIGRAM(S): 200 TABLET, FILM COATED ORAL at 23:50

## 2020-01-11 RX ADMIN — BICALUTAMIDE 50 MILLIGRAM(S): 50 TABLET, FILM COATED ORAL at 12:41

## 2020-01-11 RX ADMIN — HEPARIN SODIUM 5000 UNIT(S): 5000 INJECTION INTRAVENOUS; SUBCUTANEOUS at 06:07

## 2020-01-11 RX ADMIN — ATORVASTATIN CALCIUM 20 MILLIGRAM(S): 80 TABLET, FILM COATED ORAL at 20:44

## 2020-01-11 RX ADMIN — PANTOPRAZOLE SODIUM 40 MILLIGRAM(S): 20 TABLET, DELAYED RELEASE ORAL at 06:06

## 2020-01-11 RX ADMIN — Medication 3 MILLIGRAM(S): at 20:44

## 2020-01-11 RX ADMIN — HEPARIN SODIUM 5000 UNIT(S): 5000 INJECTION INTRAVENOUS; SUBCUTANEOUS at 18:11

## 2020-01-11 RX ADMIN — Medication 81 MILLIGRAM(S): at 12:41

## 2020-01-11 RX ADMIN — GABAPENTIN 300 MILLIGRAM(S): 400 CAPSULE ORAL at 20:44

## 2020-01-11 RX ADMIN — QUETIAPINE FUMARATE 25 MILLIGRAM(S): 200 TABLET, FILM COATED ORAL at 20:44

## 2020-01-11 RX ADMIN — SODIUM CHLORIDE 75 MILLILITER(S): 9 INJECTION INTRAMUSCULAR; INTRAVENOUS; SUBCUTANEOUS at 10:00

## 2020-01-11 NOTE — CONSULT NOTE ADULT - ASSESSMENT
89 yo Male w / hx HTN, HLD, atrial fibrillation /?dementia /  brought in by family for altered mental status and decreased responsiveness and cough, diagnosed with coronavirus 89 yo Male w / hx HTN, HLD, atrial fibrillation /dementia /  brought in by family for altered mental status and decreased responsiveness and cough, diagnosed with coronavirus.  AF is controlled  No additional cardiac testing needed at this time.    Spoke with family who informed me that his usual cardiologist is Dr. Pranay Salvador.  Called there service for a consult who will be continuing care. 89 yo Male w / hx HTN, HLD, atrial fibrillation /dementia /  brought in by family for altered mental status and decreased responsiveness and cough, diagnosed with coronavirus.  HR is controlled and is regular.  Would obtain ECG to confirm NSR  No additional cardiac testing needed at this time.    Spoke with family who informed me that his usual cardiologist is Dr. Pranay Salvador.  Called there service for a consult who will be continuing care.

## 2020-01-11 NOTE — PROGRESS NOTE ADULT - ASSESSMENT
pt /w viral syndrome   ? aspirating  ct chest noted  family states chokes w/ fluids  thickened liquids  swallow eval  given abs in er   pulm to see  / id eval noted  c/w meds   dvt proph  pt   mild iv fluids   wound eval for toe   psych to see/ pt sundowning w/ cognitive impairment   discussed w./ daughters at bedside

## 2020-01-11 NOTE — SWALLOW BEDSIDE ASSESSMENT ADULT - SLP GENERAL OBSERVATIONS
Pt received in bed. Grossly A&Ox2 (to self independently, to "[place]" independently and "hospital" with field of two choices). Reduced vocal volume. Paucity of verbal output. Inconsistent command following. Slowed, at time absent, responsiveness. Periods of eye closing.

## 2020-01-11 NOTE — CONSULT NOTE ADULT - SUBJECTIVE AND OBJECTIVE BOX
PULMONARY CONSULT    HPI: 89 y/o M with PMH HTN, HLD, afib, dementia. Presents to ED with AMS and cough. Per family, patient has had several days of congested cough and URI symptoms. He had difficulty sleeping and took Ativan 0.5mg (old RX) and was difficult to arouse this AM. Family also states he has been having difficulty swallowing, resulting in coughing with  thin liquids for some time. States less coughing with thickened liquids, but he does not like the taste. RVP noted to be positive for Coronavirus. CT chest with viral PNA +/- evidence of aspiration. Denies fevers, chills, SOB, wheezing, CP, pleuritic CP.      PAST MEDICAL & SURGICAL HISTORY:  Dyslipidemia  Dyslipidemia  Atrial fibrillation  HLD (hyperlipidemia)  HTN (hypertension)  Cancer of prostate  MI, old:  &amp;     Allergies  penicillin (Unknown)    FAMILY HISTORY: none significant in first degree relatives    Social history: never a smoker    Review of Systems:  CONSTITUTIONAL: No fever, chills, or fatigue  EYES: No eye pain, visual disturbances, or discharge  ENMT:  No difficulty hearing, tinnitus, vertigo; No sinus or throat pain  NECK: No pain or stiffness  RESPIRATORY: Per above  CARDIOVASCULAR: No chest pain, palpitations, dizziness, or leg swelling  GASTROINTESTINAL: No abdominal or epigastric pain. No nausea, vomiting, or hematemesis; No diarrhea or constipation. No melena or hematochezia.  GENITOURINARY: No dysuria, frequency, hematuria, or incontinence  NEUROLOGICAL: No headaches, memory loss, loss of strength, numbness, or tremors  SKIN: No itching, burning, rashes, or lesions   MUSCULOSKELETAL: No joint pain or swelling; No muscle, back, or extremity pain  PSYCHIATRIC: No depression, anxiety, mood swings, or difficulty sleeping      Medications:  MEDICATIONS  (STANDING):  aspirin enteric coated 81 milliGRAM(s) Oral daily  atorvastatin 20 milliGRAM(s) Oral at bedtime  bicalutamide 50 milliGRAM(s) Oral daily  gabapentin 300 milliGRAM(s) Oral at bedtime  heparin  Injectable 5000 Unit(s) SubCutaneous every 12 hours  melatonin 3 milliGRAM(s) Oral at bedtime  pantoprazole    Tablet 40 milliGRAM(s) Oral before breakfast  QUEtiapine 25 milliGRAM(s) Oral at bedtime  sodium chloride 0.9%. 1000 milliLiter(s) (75 mL/Hr) IV Continuous <Continuous>        Vital Signs Last 24 Hrs  T(C): 36.7 (2020 04:13), Max: 37.6 (10 Toy 2020 13:54)  T(F): 98 (2020 04:13), Max: 99.7 (10 Toy 2020 13:54)  HR: 89 (2020 04:13) (57 - 89)  BP: 145/67 (2020 04:13) (113/68 - 161/78)  BP(mean): --  RR: 18 (2020 04:13) (18 - 19)  SpO2: 97% (2020 04:13) (96% - 100%)      VBG pH 7.35 01-10 @ 14:12  VBG pCO2 56 01-10 @ 14:12  VBG O2 sat 37 01-10 @ 14:12  VBG lactate 1.5 01-10 @ 14:12            LABS:                        11.9   4.32  )-----------( 113      ( 2020 10:33 )             36.8     01-11    140  |  104  |  19  ----------------------------<  74  3.7   |  26  |  0.96    Ca    9.0      2020 07:08    TPro  7.7  /  Alb  3.8  /  TBili  0.9  /  DBili  x   /  AST  26  /  ALT  24  /  AlkPhos  91  01-10          CAPILLARY BLOOD GLUCOSE        PT/INR - ( 10 Toy 2020 14:12 )   PT: 11.8 sec;   INR: 1.03 ratio         PTT - ( 10 Toy 2020 14:12 )  PTT:32.6 sec  Urinalysis Basic - ( 10 Toy 2020 15:22 )    Color: Yellow / Appearance: Clear / S.025 / pH: x  Gluc: x / Ketone: Negative  / Bili: Negative / Urobili: 2 mg/dL   Blood: x / Protein: Trace / Nitrite: Negative   Leuk Esterase: Negative / RBC: 6 /hpf / WBC 1 /HPF   Sq Epi: x / Non Sq Epi: 1 /hpf / Bacteria: Negative        Physical Examination:    General: No acute distress.      HEENT: Pupils equal, reactive to light.  Symmetric.    PULM: trace bibasilar crackles     CVS: RRR    ABD: Soft, nondistended, nontender, normoactive bowel sounds, no masses    EXT: No edema, nontender    SKIN: Warm and well perfused, no rashes noted.    NEURO: A&O x2    RADIOLOGY REVIEWED  CT chest: < from: CT Chest No Cont (01.10.20 @ 18:59) >  FINDINGS:    LUNGS AND AIRWAYS: Patent central airways.  Patchy/groundglass bilateral lower lobe opacities are increased from 10/1/2018, and may be infectious in etiology. Bibasilar subsegmental atelectasis. Bilateral calcified granulomata. A few bilateral nodular opacities are unchanged from prior, for example a 5 mm right middle lobe nodule is unchanged (3:68).    PLEURA: Bilateral pleural effusions.    MEDIASTINUM AND DANYA: No lymphadenopathy.    VESSELS: Left three-vessel aortic arch. Atherosclerotic changes. Small focus of gas within the right jugular vein at the level of the clavicle is likely related to intravenous injections.    HEART: Heart size is normal. No pericardial effusion. Coronary calcifications and/or stents.    CHEST WALL AND LOWER NECK: Within normal limits.    VISUALIZED UPPER ABDOMEN: Right renal cyst.    BONES: Degenerative changes.    IMPRESSION:     Patchy/groundglass bilateral lower lobe opacities are new/increased from 10/1/2018 and may be infectious in etiology. This pattern can be seen in the setting of aspiration.    < end of copied text >

## 2020-01-11 NOTE — PROGRESS NOTE ADULT - SUBJECTIVE AND OBJECTIVE BOX
CHIEF COMPLAINT:Patient is a 89y old  Male who presents with a chief complaint of   	        PAST MEDICAL & SURGICAL HISTORY:  Dyslipidemia  Dyslipidemia  Atrial fibrillation  HLD (hyperlipidemia)  HTN (hypertension)  Cancer of prostate  MI, old: 1986 &amp; 1997          REVIEW OF SYSTEMS:  pt sleepy  given serowuel earlier this am  agitated at night   no apparent cp or sob  no n/v    Medications:  MEDICATIONS  (STANDING):  aspirin enteric coated 81 milliGRAM(s) Oral daily  atorvastatin 20 milliGRAM(s) Oral at bedtime  bicalutamide 50 milliGRAM(s) Oral daily  gabapentin 300 milliGRAM(s) Oral at bedtime  heparin  Injectable 5000 Unit(s) SubCutaneous every 12 hours  melatonin 3 milliGRAM(s) Oral at bedtime  pantoprazole    Tablet 40 milliGRAM(s) Oral before breakfast  QUEtiapine 25 milliGRAM(s) Oral at bedtime  sodium chloride 0.9%. 1000 milliLiter(s) (75 mL/Hr) IV Continuous <Continuous>    MEDICATIONS  (PRN):    	    PHYSICAL EXAM:  T(C): 36.7 (01-11-20 @ 04:13), Max: 37.6 (01-10-20 @ 13:54)  HR: 89 (01-11-20 @ 04:13) (57 - 89)  BP: 145/67 (01-11-20 @ 04:13) (113/68 - 161/78)  RR: 18 (01-11-20 @ 04:13) (18 - 19)  SpO2: 97% (01-11-20 @ 04:13) (96% - 100%)  Wt(kg): --  I&O's Summary      	    Cardiovascular: Normal S1 S2, No JVD,  Respiratory:dec bs   Gastrointestinal:  Soft, Non-tender, + BS	  Skin: No rashes, No ecchymoses, No cyanosis	  Neurologic: Non-focal  Extremities:dec rom   toe wound   pvd    TELEMETRY: 	    ECG:  	  RADIOLOGY:  OTHER: 	  	  LABS:	 	    CARDIAC MARKERS:                                12.8   5.73  )-----------( 115      ( 10 Toy 2020 14:12 )             40.2     01-11    140  |  104  |  19  ----------------------------<  74  3.7   |  26  |  0.96    Ca    9.0      11 Jan 2020 07:08    TPro  7.7  /  Alb  3.8  /  TBili  0.9  /  DBili  x   /  AST  26  /  ALT  24  /  AlkPhos  91  01-10    proBNP:   Lipid Profile:   HgA1c:   TSH:

## 2020-01-11 NOTE — CONSULT NOTE ADULT - ATTENDING COMMENTS
I will continue to follow. Please feel free to contact me with any further questions.    Manan Calles M.D.  [facility] Division of Infectious Disease  8AM-5PM: Pager Number [phone]  After Hours (or if no response): Please contact the Infectious Diseases Office at [phone]
observe off abx  pt consult  mbs then dc home, family and pt do not want a peg

## 2020-01-11 NOTE — SWALLOW BEDSIDE ASSESSMENT ADULT - ASR SWALLOW RECOMMEND DIAG
MD, please write order for Modified Barium Swallow Study. Will schedule exam upon receipt of order in Radiology./VFSS/MBS

## 2020-01-11 NOTE — SWALLOW BEDSIDE ASSESSMENT ADULT - ADDITIONAL RECOMMENDATIONS
Maintain good oral hygiene.  Extensive discussion had with pt's daughters re: bedside swallow evaluation findings, recommendations and rationale for recommendations. Daughters expressed possible desire to provide this 89 y/o pt with pleasure feeds despite the risks of aspiration but wish to have further discussions with family and MD re: both feeding GOC and whether pt/family wish to pursue MBS at this time. Defer to medical team for GOC discussion and dietary recommendations in keeping with pt/family wishes. Pt/family were made aware that this service cannot condone oral feeding but that a conservative PO option is Dysphagia 1 with honey thick liquid if pt/family wish to continue an oral diet despite the risks of aspiration.

## 2020-01-11 NOTE — SWALLOW BEDSIDE ASSESSMENT ADULT - SWALLOW EVAL: DIAGNOSIS
Pt presents with an oral and suspected pharyngeal dysphagia superimposed upon a reduced mental status and characterized by inconsistent absent oral grading to utensil, delayed oral transit time, delayed/absent trigger of the pharyngeal swallow requiring verbal cueing to initiate, reduced hyolaryngeal elevation upon palpation. Given significant delay/absent in trigger of the pharyngeal swallow and reduced mental status unable to r/o aspiration at the bedside. Would recommend objective swallow study to determine least restrictive diet.

## 2020-01-11 NOTE — SWALLOW BEDSIDE ASSESSMENT ADULT - SLP PERTINENT HISTORY OF CURRENT PROBLEM
HPI: 89 yo Male w / hx HTN, HLD, atrial fibrillation /?dementia /  brought in by family for altered mental status and decreased responsiveness and cough  Patient with several days of cough and URI symptoms, difficulty sleeping, so he took 0.5 mg Ativan (old prescription, which he has not used in a long time), and he was difficult to arouse this morning. pt now more awake and alert. Patient was admitted to medicine for AMS and coronavirus. ID consulted for possible bacterial pneumonia. Per ID, Coronavirus, unlikely to have superimposed bacterial infection at this time. Pulm:  Family also states he has been having difficulty swallowing, resulting in coughing with  thin liquids for some time. States less coughing with thickened liquids, but he does not like the taste.  CT chest with viral PNA +/- evidence of aspiration. CT chest with aspiration pneumonitis vs. viral PNA. Doubt bacterial PNA given normal WBC, afebrile. Consider observe off abx

## 2020-01-11 NOTE — CONSULT NOTE ADULT - ASSESSMENT
89 y/o M with PMH HTN, HLD, afib, dementia. Presents to ED with AMS and cough. Per family, patient has had several days of congested cough and URI symptoms. He had difficulty sleeping and took Ativan 0.5mg (old RX) and was difficult to arouse this AM. Family also states he has been having difficulty swallowing, resulting in coughing with  thin liquids for some time. States less coughing with thickened liquids, but he does not like the taste. RVP noted to be positive for Coronavirus. CT chest with viral PNA +/- evidence of aspiration. Currently breathing comfortably on RA, sats 92-96%.

## 2020-01-11 NOTE — SWALLOW BEDSIDE ASSESSMENT ADULT - PHARYNGEAL PHASE
Decreased laryngeal elevation/Pt required continual verbal cueing to initiate swallow trigger, at times delay in trigger was greater than 10 seconds (improved via cup sips)/Delayed pharyngeal swallow/Absent trigger of swallow

## 2020-01-11 NOTE — CONSULT NOTE ADULT - SUBJECTIVE AND OBJECTIVE BOX
CHIEF COMPLAINT: Change in MS, Atrial fibrillation    HPI:  89 yo Male w / hx HTN, HLD, atrial fibrillation /?dementia /  brought in by family for altered mental status and decreased responsiveness and cough    Patient with several days of cough and URI symptoms, difficulty sleeping, so he took 0.5 mg Ativan (old prescription, which he has not used in a long time), and he was difficult to arouse this morning.  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 1000 milliLiter(s) IV Continuous <Continuous>      REVIEW OF SYSTEMS:    CONSTITUTIONAL: No weakness, fevers or chills  EYES/ENT: No visual changes;  No vertigo or throat pain   NECK: No pain or stiffness  RESPIRATORY: No cough, wheezing, hemoptysis; No shortness of breath  CARDIOVASCULAR: No chest pain or palpitations  GASTROINTESTINAL: No abdominal or epigastric pain. No nausea, vomiting, or hematemesis; No diarrhea or constipation. No melena or hematochezia.  GENITOURINARY: No dysuria, frequency or hematuria  NEUROLOGICAL: No numbness or weakness  SKIN: No itching, burning, rashes, or lesions   All other review of systems is negative unless indicated above    Vital Signs Last 24 Hrs  T(C): 36.7 (11 Jan 2020 04:13), Max: 37.6 (10 Toy 2020 13:54)  T(F): 98 (11 Jan 2020 04:13), Max: 99.7 (10 Toy 2020 13:54)  HR: 89 (11 Jan 2020 04:13) (57 - 89)  BP: 145/67 (11 Jan 2020 04:13) (113/68 - 161/78)  BP(mean): --  RR: 18 (11 Jan 2020 04:13) (18 - 19)  SpO2: 97% (11 Jan 2020 04:13) (96% - 100%)    I&O's Summary      PHYSICAL EXAM:    Constitutional: NAD, awake and alert, well-developed  HEENT: PERR, EOMI  Neck: soft and supple, No LAD, No JVD  Respiratory: Breath sounds are clear bilaterally, No wheezing, rales or rhonchi  Cardiovascular: Regular rate and rhythm, normal S1 and S2,  no murmurs, gallops or rubs  Gastrointestinal: Bowel Sounds present, soft, nontender.   Extremities: No peripheral edema. No clubbing or cyanosis.  Vascular: 2+ peripheral pulses  Neurological: A/O x 3, no focal deficits  Musculoskeletal: no calf tenderness.  Skin: No rashes.      LABS: All Labs Reviewed:                        11.9   4.32  )-----------( 113      ( 11 Jan 2020 10:33 )             36.8                         12.8   5.73  )-----------( 115      ( 10 Toy 2020 14:12 )             40.2     11 Jan 2020 07:08    140    |  104    |  19     ----------------------------<  74     3.7     |  26     |  0.96   10 Toy 2020 14:12    137    |  101    |  27     ----------------------------<  95     4.5     |  27     |  1.15     Ca    9.0        11 Jan 2020 07:08  Ca    9.6        10 Toy 2020 14:12    TPro  7.7    /  Alb  3.8    /  TBili  0.9    /  DBili  x      /  AST  26     /  ALT  24     /  AlkPhos  91     10 Toy 2020 14:12    PT/INR - ( 10 Toy 2020 14:12 )   PT: 11.8 sec;   INR: 1.03 ratio         PTT - ( 10 Toy 2020 14:12 )  PTT:32.6 sec  Blood Culture:     CARDIAC MARKERS:                RADIOLOGY/EKG: CHIEF COMPLAINT: Change in MS, Atrial fibrillation        Usual cardiologist is Dr. Pranay Salvador    HPI:  89 yo Male w / hx HTN, HLD, atrial fibrillation /dementia /  brought in by family for altered mental status and decreased responsiveness and cough    Patient with several days of cough and URI symptoms, difficulty sleeping, so he took 0.5 mg Ativan (old prescription, which he has not used in a long time), and he was difficult to arouse this morning.  pt now more awake and alert (10 Toy 2020 19:14)  Currently he is confused.  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 1000 milliLiter(s) IV Continuous <Continuous>      REVIEW OF SYSTEMS:    CONSTITUTIONAL: No weakness, fevers or chills  EYES/ENT: No visual changes;  No vertigo or throat pain   NECK: No pain or stiffness  RESPIRATORY: No cough, wheezing, hemoptysis; No shortness of breath  CARDIOVASCULAR: No chest pain or palpitations  GASTROINTESTINAL: No abdominal or epigastric pain. No nausea, vomiting, or hematemesis; No diarrhea or constipation. No melena or hematochezia.  GENITOURINARY: No dysuria, frequency or hematuria  NEUROLOGICAL: No numbness or weakness  SKIN: No itching, burning, rashes, or lesions   All other review of systems is negative unless indicated above    Vital Signs Last 24 Hrs  T(C): 36.7 (11 Jan 2020 04:13), Max: 37.6 (10 Toy 2020 13:54)  T(F): 98 (11 Jan 2020 04:13), Max: 99.7 (10 Oty 2020 13:54)  HR: 89 (11 Jan 2020 04:13) (57 - 89)  BP: 145/67 (11 Jan 2020 04:13) (113/68 - 161/78)  BP(mean): --  RR: 18 (11 Jan 2020 04:13) (18 - 19)  SpO2: 97% (11 Jan 2020 04:13) (96% - 100%)    I&O's Summary      PHYSICAL EXAM:    Constitutional: NAD, awake and alert, well-developed  HEENT: PERR, EOMI  Neck: soft and supple, No LAD, No JVD  Respiratory: Breath sounds are clear bilaterally, No wheezing, rales or rhonchi  Cardiovascular: Regular rate and rhythm, normal S1 and S2,  no murmurs, gallops or rubs  Gastrointestinal: Bowel Sounds present, soft, nontender.   Extremities: No peripheral edema. No clubbing or cyanosis.  Vascular: 2+ peripheral pulses  Neurological: A/O x 3, no focal deficits  Musculoskeletal: no calf tenderness.  Skin: No rashes.      LABS: All Labs Reviewed:                        11.9   4.32  )-----------( 113      ( 11 Jan 2020 10:33 )             36.8                         12.8   5.73  )-----------( 115      ( 10 Toy 2020 14:12 )             40.2     11 Jan 2020 07:08    140    |  104    |  19     ----------------------------<  74     3.7     |  26     |  0.96   10 Toy 2020 14:12    137    |  101    |  27     ----------------------------<  95     4.5     |  27     |  1.15     Ca    9.0        11 Jan 2020 07:08  Ca    9.6        10 Toy 2020 14:12    TPro  7.7    /  Alb  3.8    /  TBili  0.9    /  DBili  x      /  AST  26     /  ALT  24     /  AlkPhos  91     10 Toy 2020 14:12    PT/INR - ( 10 Toy 2020 14:12 )   PT: 11.8 sec;   INR: 1.03 ratio         PTT - ( 10 Toy 2020 14:12 )  PTT:32.6 sec  Blood Culture:     CARDIAC MARKERS:                RADIOLOGY/EKG: CHIEF COMPLAINT: Change in MS, H/O Atrial fibrillation        Usual cardiologist is Dr. Pranay Salvador    HPI:  89 yo Male w / hx HTN, HLD, atrial fibrillation /dementia /  brought in by family for altered mental status and decreased responsiveness and cough    Patient with several days of cough and URI symptoms, difficulty sleeping, so he took 0.5 mg Ativan (old prescription, which he has not used in a long time), and he was difficult to arouse this morning.  pt now more awake and alert (10 Toy 2020 19:14)  Currently he is confused.  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 1000 milliLiter(s) IV Continuous <Continuous>      REVIEW OF SYSTEMS:    CONSTITUTIONAL: No weakness, fevers or chills  EYES/ENT: No visual changes;  No vertigo or throat pain   NECK: No pain or stiffness  RESPIRATORY: No cough, wheezing, hemoptysis; No shortness of breath  CARDIOVASCULAR: No chest pain or palpitations  GASTROINTESTINAL: No abdominal or epigastric pain. No nausea, vomiting, or hematemesis; No diarrhea or constipation. No melena or hematochezia.  GENITOURINARY: No dysuria, frequency or hematuria  NEUROLOGICAL: No numbness or weakness  SKIN: No itching, burning, rashes, or lesions   All other review of systems is negative unless indicated above    Vital Signs Last 24 Hrs  T(C): 36.7 (11 Jan 2020 04:13), Max: 37.6 (10 Toy 2020 13:54)  T(F): 98 (11 Jan 2020 04:13), Max: 99.7 (10 Toy 2020 13:54)  HR: 89 (11 Jan 2020 04:13) (57 - 89)  BP: 145/67 (11 Jan 2020 04:13) (113/68 - 161/78)  BP(mean): --  RR: 18 (11 Jan 2020 04:13) (18 - 19)  SpO2: 97% (11 Jan 2020 04:13) (96% - 100%)    I&O's Summary      PHYSICAL EXAM:    Constitutional: NAD, awake and alert, well-developed  HEENT: PERR, EOMI  Neck: soft and supple, No LAD, No JVD  Respiratory: Breath sounds are clear bilaterally, No wheezing, rales or rhonchi  Cardiovascular: Regular rate and rhythm, normal S1 and S2,  no murmurs, gallops or rubs  Gastrointestinal: Bowel Sounds present, soft, nontender.   Extremities: No peripheral edema. No clubbing or cyanosis.  Vascular: 2+ peripheral pulses  Neurological: A/O x 3, no focal deficits  Musculoskeletal: no calf tenderness.  Skin: No rashes.      LABS: All Labs Reviewed:                        11.9   4.32  )-----------( 113      ( 11 Jan 2020 10:33 )             36.8                         12.8   5.73  )-----------( 115      ( 10 Toy 2020 14:12 )             40.2     11 Jan 2020 07:08    140    |  104    |  19     ----------------------------<  74     3.7     |  26     |  0.96   10 Toy 2020 14:12    137    |  101    |  27     ----------------------------<  95     4.5     |  27     |  1.15     Ca    9.0        11 Jan 2020 07:08  Ca    9.6        10 Toy 2020 14:12    TPro  7.7    /  Alb  3.8    /  TBili  0.9    /  DBili  x      /  AST  26     /  ALT  24     /  AlkPhos  91     10 Toy 2020 14:12    PT/INR - ( 10 Toy 2020 14:12 )   PT: 11.8 sec;   INR: 1.03 ratio         PTT - ( 10 Toy 2020 14:12 )  PTT:32.6 sec  Blood Culture:     CARDIAC MARKERS:                RADIOLOGY/EKG:

## 2020-01-11 NOTE — SWALLOW BEDSIDE ASSESSMENT ADULT - COMMENTS
Psych to see/ pt sundowning w/ cognitive impairment.  IMAGIN/10 CT Chest  Patchy/groundglass bilateral lower lobe opacities are new/increased from 10/1/2018 and may be infectious in etiology. This pattern can be seen in the setting of aspiration.

## 2020-01-11 NOTE — CONSULT NOTE ADULT - PROBLEM SELECTOR RECOMMENDATION 3
hx of coughing with thin liquids  -Conservative dysphagia 2 diet for now  -Pending official S&S eval

## 2020-01-11 NOTE — SWALLOW BEDSIDE ASSESSMENT ADULT - SWALLOW EVAL: PATIENT/FAMILY GOALS STATEMENT
Daughters report pt received a swallow evaluation within the last few years and that recommendations included thickened liquid. Daughters state pt did not like thickened liquids so he stopped. Daughters could not elaborate on what liquid consistency was recommended or what type of swallow assessment pt received. Also states recommendations included swallow tx but that pt did not follow up given age and suspected poor participation. Reports pt coughs "uncontrollably" with thin liquid to the point of "getting red in the face and eyes tear." Reports this occurs with all thin liquid including chicken soup. No known h/o opnas. Pt received foods such as chicken cut into small pieces, potatoes, rice and salad PTA. No difficulty with solid food.

## 2020-01-11 NOTE — CONSULT NOTE ADULT - SUBJECTIVE AND OBJECTIVE BOX
INFECTIOUS DISEASE SERVICE INITIAL CONSULTATION NOTE    HPI:  89 yo Male w / hx HTN, HLD, atrial fibrillation /?dementia /  brought in by family for altered mental status and decreased responsiveness and cough    Patient with several days of cough and URI symptoms, difficulty sleeping, so he took 0.5 mg Ativan (old prescription, which he has not used in a long time), and he was difficult to arouse this morning.  pt now more awake and alert (10 Toy 2020 19:14)  Patient was admitted to medicine for AMS and coronavirus. ID consulted for possible pneumonia.   The patient is not able to provide any additional history. Possibly coughing for 3 weeks.      PAST MEDICAL & SURGICAL HISTORY:  Dyslipidemia  Dyslipidemia  Atrial fibrillation  HLD (hyperlipidemia)  HTN (hypertension)  Cancer of prostate  MI, old:  &amp;       REVIEW OF SYSTEMS: unable to obtain because patient's is altered    ACTIVE ANTIMICROBIAL/ANTIBIOTIC MEDICATIONS:      OTHER MEDICATIONS:  aspirin enteric coated 81 milliGRAM(s) Oral daily  atorvastatin 20 milliGRAM(s) Oral at bedtime  bicalutamide 50 milliGRAM(s) Oral daily  gabapentin 300 milliGRAM(s) Oral at bedtime  heparin  Injectable 5000 Unit(s) SubCutaneous every 12 hours  melatonin 3 milliGRAM(s) Oral at bedtime  pantoprazole    Tablet 40 milliGRAM(s) Oral before breakfast  QUEtiapine 25 milliGRAM(s) Oral at bedtime  sodium chloride 0.9%. 1000 milliLiter(s) IV Continuous <Continuous>      ALLERGIES:  Allergies    penicillin (Unknown)    Intolerances        SOCIAL HISTORY: No alcohol, smoking, or drugs.     FAMILY HISTORY: Patient altered and unable to answer.       VITAL SIGNS:  ICU Vital Signs Last 24 Hrs  T(C): 36.7 (2020 04:13), Max: 37.6 (10 Toy 2020 13:54)  T(F): 98 (2020 04:13), Max: 99.7 (10 Toy 2020 13:54)  HR: 89 (2020 04:13) (57 - 89)  BP: 145/67 (2020 04:13) (113/68 - 161/78)  BP(mean): --  ABP: --  ABP(mean): --  RR: 18 (2020 04:13) (18 - 19)  SpO2: 97% (2020 04:13) (96% - 100%)      PHYSICAL EXAM:  Constitutional: chronically ill appearing elderly man trying to climb out of bed, mumbling incomprehensible   Head: NC/AT  Eyes: anicteric sclera  ENMT: no rhinorrhea; no sinus tenderness on palpation; no oropharyngeal lesions, erythema, or exudates	  Neck: supple; no JVD or LAD  Respiratory: Clear anteriorly  Cardiovascular: +S1/S2, RRR; no appreciable murmurs  Gastrointestinal: soft, NT/ND; +BSx4, no HSM  Extremities: WWP; no clubbing, cyanosis, or edema  Dermatologic: skin warm and dry; no visible rashes or lesions  Neurologic: AAOx0; moving arms and legs equally    LABS:                        12.8   5.73  )-----------( 115      ( 10 Toy 2020 14:12 )             40.2         140  |  104  |  19  ----------------------------<  74  3.7   |  26  |  0.96    Ca    9.0      2020 07:08    TPro  7.7  /  Alb  3.8  /  TBili  0.9  /  DBili  x   /  AST  26  /  ALT  24  /  AlkPhos  91  01-10    PT/INR - ( 10 Toy 2020 14:12 )   PT: 11.8 sec;   INR: 1.03 ratio         PTT - ( 10 Toy 2020 14:12 )  PTT:32.6 sec  Urinalysis Basic - ( 10 Toy 2020 15:22 )    Color: Yellow / Appearance: Clear / S.025 / pH: x  Gluc: x / Ketone: Negative  / Bili: Negative / Urobili: 2 mg/dL   Blood: x / Protein: Trace / Nitrite: Negative   Leuk Esterase: Negative / RBC: 6 /hpf / WBC 1 /HPF   Sq Epi: x / Non Sq Epi: 1 /hpf / Bacteria: Negative        MICROBIOLOGY:      RADIOLOGY & ADDITIONAL STUDIES:    < from: Xray Chest 1 View- PORTABLE-Urgent (01.10.20 @ 15:03) >  EXAM:  XR CHEST PORTABLE URGENT 1V                            PROCEDURE DATE:  01/10/2020            INTERPRETATION:    DATE OF STUDY: 1/10/2020    PRIOR:10/1/18.     CLINICAL INDICATION: R/O pneumonia.    TECHNIQUE: portable chest.    FINDINGS:   The cardiomediastinal silhouette is within normal limits.  The right lung is grossly clear.  Since 10/1/18 exam, new left basilar/left retrocardiac haziness noted - this may be due to atelectasis -underlying pneumonia not excluded in the right clinical setting.  No pleural effusion or pneumothorax.  No acute bony findings.    IMPRESSION:   1. No right lung consolidations.  2. Since 10/1/18 exam, new left basilar atelectasis and/or pneumonia.                      JOHN BRAXTON M.D., ATTENDING RADIOLOGIST  This document has been electronically signed. Toy 10 2020  3:13PM    < end of copied text >

## 2020-01-11 NOTE — CONSULT NOTE ADULT - PROBLEM SELECTOR RECOMMENDATION 9
CT chest with aspiration pneumonitis vs. viral PNA  -Doubt bacterial PNA given normal WBC, afebrile   -Hx of dysphagia and coughing with thin liquids   -F/u procalcitonin  -Consider observe off abx  -D/w patients family at bedside and son-in-law via phone CT chest with aspiration pneumonitis vs. viral PNA  -Doubt bacterial PNA given normal WBC, afebrile, no significant sputum  -Hx of dysphagia and coughing with thin liquids   -F/u procalcitonin  -Consider observe off abx  -D/w patients family at bedside and son-in-law via phone

## 2020-01-12 ENCOUNTER — TRANSCRIPTION ENCOUNTER (OUTPATIENT)
Age: 85
End: 2020-01-12

## 2020-01-12 VITALS
OXYGEN SATURATION: 97 % | TEMPERATURE: 98 F | HEART RATE: 73 BPM | DIASTOLIC BLOOD PRESSURE: 69 MMHG | RESPIRATION RATE: 18 BRPM | SYSTOLIC BLOOD PRESSURE: 108 MMHG

## 2020-01-12 DIAGNOSIS — F05 DELIRIUM DUE TO KNOWN PHYSIOLOGICAL CONDITION: ICD-10-CM

## 2020-01-12 DIAGNOSIS — G30.1 ALZHEIMER'S DISEASE WITH LATE ONSET: ICD-10-CM

## 2020-01-12 LAB
ANION GAP SERPL CALC-SCNC: 12 MMOL/L — SIGNIFICANT CHANGE UP (ref 5–17)
BUN SERPL-MCNC: 19 MG/DL — SIGNIFICANT CHANGE UP (ref 7–23)
CALCIUM SERPL-MCNC: 8.5 MG/DL — SIGNIFICANT CHANGE UP (ref 8.4–10.5)
CHLORIDE SERPL-SCNC: 106 MMOL/L — SIGNIFICANT CHANGE UP (ref 96–108)
CO2 SERPL-SCNC: 22 MMOL/L — SIGNIFICANT CHANGE UP (ref 22–31)
CREAT SERPL-MCNC: 0.91 MG/DL — SIGNIFICANT CHANGE UP (ref 0.5–1.3)
GAS PNL BLDV: SIGNIFICANT CHANGE UP
GLUCOSE SERPL-MCNC: 81 MG/DL — SIGNIFICANT CHANGE UP (ref 70–99)
GRAM STN FLD: SIGNIFICANT CHANGE UP
HCT VFR BLD CALC: 36 % — LOW (ref 39–50)
HGB BLD-MCNC: 11.5 G/DL — LOW (ref 13–17)
MCHC RBC-ENTMCNC: 29.9 PG — SIGNIFICANT CHANGE UP (ref 27–34)
MCHC RBC-ENTMCNC: 31.9 GM/DL — LOW (ref 32–36)
MCV RBC AUTO: 93.5 FL — SIGNIFICANT CHANGE UP (ref 80–100)
PLATELET # BLD AUTO: 116 K/UL — LOW (ref 150–400)
POTASSIUM SERPL-MCNC: 4 MMOL/L — SIGNIFICANT CHANGE UP (ref 3.5–5.3)
POTASSIUM SERPL-SCNC: 4 MMOL/L — SIGNIFICANT CHANGE UP (ref 3.5–5.3)
RBC # BLD: 3.85 M/UL — LOW (ref 4.2–5.8)
RBC # FLD: 13.3 % — SIGNIFICANT CHANGE UP (ref 10.3–14.5)
SODIUM SERPL-SCNC: 140 MMOL/L — SIGNIFICANT CHANGE UP (ref 135–145)
WBC # BLD: 4.75 K/UL — SIGNIFICANT CHANGE UP (ref 3.8–10.5)
WBC # FLD AUTO: 4.75 K/UL — SIGNIFICANT CHANGE UP (ref 3.8–10.5)

## 2020-01-12 PROCEDURE — 84145 PROCALCITONIN (PCT): CPT

## 2020-01-12 PROCEDURE — 85610 PROTHROMBIN TIME: CPT

## 2020-01-12 PROCEDURE — 82330 ASSAY OF CALCIUM: CPT

## 2020-01-12 PROCEDURE — 87486 CHLMYD PNEUM DNA AMP PROBE: CPT

## 2020-01-12 PROCEDURE — 84132 ASSAY OF SERUM POTASSIUM: CPT

## 2020-01-12 PROCEDURE — 96374 THER/PROPH/DIAG INJ IV PUSH: CPT

## 2020-01-12 PROCEDURE — 92610 EVALUATE SWALLOWING FUNCTION: CPT

## 2020-01-12 PROCEDURE — 85027 COMPLETE CBC AUTOMATED: CPT

## 2020-01-12 PROCEDURE — 85014 HEMATOCRIT: CPT

## 2020-01-12 PROCEDURE — 83605 ASSAY OF LACTIC ACID: CPT

## 2020-01-12 PROCEDURE — 87798 DETECT AGENT NOS DNA AMP: CPT

## 2020-01-12 PROCEDURE — 80048 BASIC METABOLIC PNL TOTAL CA: CPT

## 2020-01-12 PROCEDURE — 97162 PT EVAL MOD COMPLEX 30 MIN: CPT

## 2020-01-12 PROCEDURE — 82435 ASSAY OF BLOOD CHLORIDE: CPT

## 2020-01-12 PROCEDURE — 71250 CT THORAX DX C-: CPT

## 2020-01-12 PROCEDURE — 84295 ASSAY OF SERUM SODIUM: CPT

## 2020-01-12 PROCEDURE — 87633 RESP VIRUS 12-25 TARGETS: CPT

## 2020-01-12 PROCEDURE — 96375 TX/PRO/DX INJ NEW DRUG ADDON: CPT

## 2020-01-12 PROCEDURE — 80053 COMPREHEN METABOLIC PANEL: CPT

## 2020-01-12 PROCEDURE — 87150 DNA/RNA AMPLIFIED PROBE: CPT

## 2020-01-12 PROCEDURE — 99238 HOSP IP/OBS DSCHRG MGMT 30/<: CPT

## 2020-01-12 PROCEDURE — 87086 URINE CULTURE/COLONY COUNT: CPT

## 2020-01-12 PROCEDURE — 99223 1ST HOSP IP/OBS HIGH 75: CPT

## 2020-01-12 PROCEDURE — 71045 X-RAY EXAM CHEST 1 VIEW: CPT

## 2020-01-12 PROCEDURE — 81001 URINALYSIS AUTO W/SCOPE: CPT

## 2020-01-12 PROCEDURE — 82947 ASSAY GLUCOSE BLOOD QUANT: CPT

## 2020-01-12 PROCEDURE — 87581 M.PNEUMON DNA AMP PROBE: CPT

## 2020-01-12 PROCEDURE — 82803 BLOOD GASES ANY COMBINATION: CPT

## 2020-01-12 PROCEDURE — 85730 THROMBOPLASTIN TIME PARTIAL: CPT

## 2020-01-12 PROCEDURE — 87040 BLOOD CULTURE FOR BACTERIA: CPT

## 2020-01-12 PROCEDURE — 93005 ELECTROCARDIOGRAM TRACING: CPT

## 2020-01-12 PROCEDURE — 99285 EMERGENCY DEPT VISIT HI MDM: CPT | Mod: 25

## 2020-01-12 RX ADMIN — PANTOPRAZOLE SODIUM 40 MILLIGRAM(S): 20 TABLET, DELAYED RELEASE ORAL at 06:30

## 2020-01-12 RX ADMIN — HEPARIN SODIUM 5000 UNIT(S): 5000 INJECTION INTRAVENOUS; SUBCUTANEOUS at 06:30

## 2020-01-12 RX ADMIN — BICALUTAMIDE 50 MILLIGRAM(S): 50 TABLET, FILM COATED ORAL at 12:55

## 2020-01-12 RX ADMIN — Medication 81 MILLIGRAM(S): at 12:55

## 2020-01-12 NOTE — DISCHARGE NOTE PROVIDER - DISCHARGE DATE
12-Jan-2020 Subjective:       Patient ID: Kristopher Ye is a 41 y.o. female.    Chief Complaint: Swelling    41-year-old female presents to the clinic today with complaint of generalized swelling to her whole body for the past 2 weeks.  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  She states at the end of the day she has swelling to both of her feet. .  She states the swelling was worse yesterday.  She says she just does not feel good.  She denies any cardiac chest pain,  heart palpitations, or shortness of breath.  She stated she started eating fast food over the past two weeks.  She is currently on a diet and being followed by Dr. Pascual. She is trying to get gastric bypass surgery.  She denies any upper respiratory tract symptoms.      Past Medical History:   Diagnosis Date    Abnormal Pap smear     pt states 13yrs ago colpo was done    Anemia     Fibroid     Hypertension      Past Surgical History:   Procedure Laterality Date     SECTION      TONSILLECTOMY      TUBAL LIGATION        reports that she has never smoked. She has never used smokeless tobacco. She reports that she drinks alcohol. She reports that she does not use drugs.  Review of Systems   HENT: Negative for congestion, ear discharge, ear pain, postnasal drip, sinus pressure and sore throat.    Eyes: Negative for pain, discharge and itching.   Respiratory: Negative for cough, shortness of breath and wheezing.    Cardiovascular: Positive for leg swelling. Negative for chest pain and palpitations.   Gastrointestinal: Negative for abdominal pain, diarrhea, nausea and vomiting.   Musculoskeletal: Negative for gait problem.   Neurological: Negative for dizziness, light-headedness and headaches.       Objective:      Physical Exam   Constitutional: She is oriented to person, place, and time. She appears well-developed and well-nourished. No distress.   HENT:   Head: Normocephalic and atraumatic.   Right Ear:  External ear normal.   Left Ear: External ear normal.   Nose: Nose normal.   Mouth/Throat: Oropharynx is clear and moist. No oropharyngeal exudate.   Eyes: Conjunctivae and EOM are normal. Pupils are equal, round, and reactive to light. Right eye exhibits no discharge. Left eye exhibits no discharge. No scleral icterus.   Neck: Normal range of motion. Neck supple. No JVD present.   Cardiovascular: Normal rate, regular rhythm and normal heart sounds.  Exam reveals no gallop and no friction rub.    No murmur heard.  Pulmonary/Chest: Effort normal and breath sounds normal. No respiratory distress. She has no wheezes. She has no rales.   Abdominal: Soft. Bowel sounds are normal. There is no tenderness.   Musculoskeletal: Normal range of motion. She exhibits edema.   Trace pedal edema    Neurological: She is alert and oriented to person, place, and time.   Skin: Skin is warm and dry. She is not diaphoretic.   Psychiatric: She has a normal mood and affect.       Assessment:       1. Edema, unspecified type    2. Cardiomegaly    3. Essential hypertension    4. Morbid obesity (BMI= 55.9 on 10/30/14)    5. Sciatica of right side        Plan:         Edema, unspecified type  -     EKG 12-lead  EKG shows normal sinus rhythm prolonged QT    Cardiomegaly  - follow up with Dr. Olivares her cardiologist     Essential hypertension  - follow up with PCP on 8/16/2017 for a blood pressure check up    Morbid obesity (BMI= 55.9 on 10/30/14)  - continue diet and exercise program    Sciatica of right side  -     ketorolac injection 60 mg; Inject 2 mLs (60 mg total) into the muscle one time.

## 2020-01-12 NOTE — DISCHARGE NOTE PROVIDER - NSDCMRMEDTOKEN_GEN_ALL_CORE_FT
Aspirin Enteric Coated 81 mg oral delayed release tablet: 1 tab(s) orally once a day (in the evening)  atorvastatin 20 mg oral tablet: 1 tab(s) orally once a day  bicalutamide 50 mg oral tablet: 1 tab(s) orally once a day (in the morning)  cholecalciferol oral tablet: 1 tab(s) orally once a day  Flomax 0.4 mg oral capsule: 1 cap(s) orally once a day  gabapentin 300 mg oral capsule: 2 cap(s) orally once a day (at bedtime)  Melatonin: 1  orally once a day (at bedtime)  QUEtiapine 100 mg oral tablet: 1 tab(s) orally once a day (at bedtime)  (taken along with 2tabs of 25mg= total dose 150mg)  QUEtiapine 25 mg oral tablet: 2 tab(s) orally once a day (at bedtime)  (taken along with 100mg= total dose 150mg)  triazolam 0.125 mg oral tablet: 1 tab(s) orally once a day (at bedtime)    NOTE: Rx dispensed as 2tabs at bedtime  vitamin b: 1 cap(s) orally 2 times a day

## 2020-01-12 NOTE — BEHAVIORAL HEALTH ASSESSMENT NOTE - NS ED BHA MSE SPEECH RATE
conducted a detailed discussion... I had a detailed discussion with the patient and/or guardian regarding the historical points, exam findings, and any diagnostic results supporting the discharge/admit diagnosis. Normal

## 2020-01-12 NOTE — PHYSICAL THERAPY INITIAL EVALUATION ADULT - LEVEL OF INDEPENDENCE: STAIR NEGOTIATION, REHAB EVAL
Family refused stair negotiation, report family and aids assist pt up and down stairs 1 time daily, stairs refused at this time.

## 2020-01-12 NOTE — DISCHARGE NOTE PROVIDER - CARE PROVIDER_API CALL
Dr Jeanna  Phone: (   )    -  Fax: (   )    -  Follow Up Time:     Pranay Salvador (MD)  Cardiovascular Disease; Internal Medicine  [address], [address]  [place] [address]  Phone: [phone]  Fax: [phone]  Follow Up Time:     Fred Martin (DO)  Internal Medicine  [address], [address]  [place] [address]  Phone: [phone]  Fax: [phone]  Follow Up Time:

## 2020-01-12 NOTE — DISCHARGE NOTE PROVIDER - HOSPITAL COURSE
89 yo Male w / hx HTN, HLD, atrial fibrillation /?dementia /  brought in by family for altered mental status and decreased responsiveness and cough.  Pt was seen by ID and pulmonary who both recommended follow ing pt off antibiotics.  He has a normal white count and is afebrile.    The family is requesting to take the patient home this morning.  Discussed with Dr. Alberto who is in agreement.

## 2020-01-12 NOTE — CHART NOTE - NSCHARTNOTEFT_GEN_A_CORE
Preliminary blood cultures    Culture - Blood (01.10.20 @ 17:15)    Gram Stain:   Growth in aerobic bottle: Gram Positive Cocci in Clusters    Specimen Source: .Blood Blood-Peripheral    Culture Results:   Growth in aerobic bottle: Gram Positive Cocci in Clusters  ***Blood Panel PCR results on this specimen are available  approximately 3 hours after the Gram stain result.***  Gram stain, PCR, and/or culture results may not always  correspond due to difference in methodologies.  ************************************************************    Patient discharged today, spoke with medical attending Dr. Alberto regarding the above preliminary blood culture results and patient's discharge status. Informed to wait for infectious disease to make a determinatino about what should be don in this case. Infections disease after hours numbers paged. Awaiting call back.    Bettina Batista NP  spectralink [phone]

## 2020-01-12 NOTE — BEHAVIORAL HEALTH ASSESSMENT NOTE - HPI (INCLUDE ILLNESS QUALITY, SEVERITY, DURATION, TIMING, CONTEXT, MODIFYING FACTORS, ASSOCIATED SIGNS AND SYMPTOMS)
Pt is a 89 y/o MWM with hx of dementia and multiple medical problems, here for +AMS secondary to newly dx'd viral syndrome, coronovirus. Psychiatry called for agitation. Pt seen, AOA x 1-2, baseline level of confusion secondary to dementia syndrome, and hx obtained by dtr, mary who was at bedside. Mary states pt was lethargic over the past few days, slept into the late morning, which caused the family to be concerned. Now here, limited agitation, takes seroquel 100 mg qhs at home, receiving 25 mg qhs here in hospital. Pt was taking low dose triazolam in the middle of the night, prescribed by [facility] psychiatrist, dr moreno in [place]. She denies past hx of depression/anxiety, no hx of self injurious behaviors or suicide attempts. no past psych admissions. no substance abuse issues. Pt lives with his wife and older dtr. No home health aides at home.

## 2020-01-12 NOTE — PHYSICAL THERAPY INITIAL EVALUATION ADULT - PLANNED THERAPY INTERVENTIONS, PT EVAL
bed mobility training/balance training/gait training/strengthening/transfer training/Stair training... GOAL: In 2 weeks pt will negotiate 1 flight of stairs with min Ax1 & least restrictive device.

## 2020-01-12 NOTE — BEHAVIORAL HEALTH ASSESSMENT NOTE - NSBHCHARTREVIEWLAB_PSY_A_CORE FT
11.9   4.32  )-----------( 113      ( 11 Jan 2020 10:33 )             36.8   01-12    140  |  106  |  19  ----------------------------<  81  4.0   |  22  |  0.91    Ca    8.5      12 Jan 2020 07:12    TPro  7.7  /  Alb  3.8  /  TBili  0.9  /  DBili  x   /  AST  26  /  ALT  24  /  AlkPhos  91  01-10

## 2020-01-12 NOTE — PROGRESS NOTE ADULT - ASSESSMENT
pt /w viral syndrome   ? aspirating  ct chest noted  family states chokes w/ fluids  thickened liquids  family wish no MBS   pulm  eval noted   / id eval noted  c/w meds   dvt proph  pt   mild iv fluids   wound eval for toe   psych eval noted  seroquel as directed   discussed w./ daughter at bedside and w/ son in law who is a physician last night   pt  d/c planning

## 2020-01-12 NOTE — PHYSICAL THERAPY INITIAL EVALUATION ADULT - GAIT DEVIATIONS NOTED, PT EVAL
decreased judy/decreased velocity of limb motion/decreased stride length/decreased swing-to-stance ratio/increased time in double stance/decreased weight-shifting ability/decreased step length

## 2020-01-12 NOTE — DISCHARGE NOTE PROVIDER - CARE PROVIDERS DIRECT ADDRESSES
,DirectAddress_Unknown,[email or web address],DirectAddress_Unknown

## 2020-01-12 NOTE — BEHAVIORAL HEALTH ASSESSMENT NOTE - SUMMARY
Pt is a 89 y/o MWM with hx of dementia and multiple medical problems, here for +AMS secondary to newly dx'd viral syndrome, coronovirus. Psychiatry called for agitation. Pt seen, AOA x 1-2, baseline level of confusion secondary to dementia syndrome, and hx obtained by dtr, mary who was at bedside. Mary states pt was lethargic over the past few days, slept into the late morning, which caused the family to be concerned. Now here, limited agitation, takes seroquel 100 mg qhs at home, receiving 25 mg qhs here in hospital. Pt was taking low dose triazolam in the middle of the night, prescribed by [facility] psychiatrist, dr moreno in [place]. She denies past hx of depression/anxiety, no hx of self injurious behaviors or suicide attempts. no past psych admissions. no substance abuse issues. Pt lives with his wife and older dtr. No home health aides at home.

## 2020-01-12 NOTE — BEHAVIORAL HEALTH ASSESSMENT NOTE - NSBHCONSULTRECOMMENDOTHER_PSY_A_CORE FT
1) consider raising seroquel to 50 mg qhs for mood stability/sleep and agitation/confusion, as pt takes 100 mg qhs at home per dtr  2) for acute agitation during the day, consider seroquel 25 mg po BID PRN, f/u QTc < 500  3) avoid benzos and anti-cholinergic medications which can worsen confusion  4) dtr aware of plan above

## 2020-01-12 NOTE — DISCHARGE NOTE PROVIDER - PROVIDER TOKENS
FREE:[LAST:[Jeanna],FIRST:[Dr],PHONE:[(   )    -],FAX:[(   )    -]],PROVIDER:[TOKEN:[[id number]]],PROVIDER:[TOKEN:[[id number]]]

## 2020-01-12 NOTE — DISCHARGE NOTE PROVIDER - NSDCCPCAREPLAN_GEN_ALL_CORE_FT
PRINCIPAL DISCHARGE DIAGNOSIS  Diagnosis: Coronavirus infection  Assessment and Plan of Treatment: monitor off antibiotics  supportive care      SECONDARY DISCHARGE DIAGNOSES  Diagnosis: Late onset Alzheimer's disease without behavioral disturbance  Assessment and Plan of Treatment: supportive care    Diagnosis: Dysphagia  Assessment and Plan of Treatment: Dysphagia  family not interested in MBS at this time  thickened liquids recommended

## 2020-01-12 NOTE — PHYSICAL THERAPY INITIAL EVALUATION ADULT - IMPAIRMENTS FOUND, PT EVAL
muscle strength/gait, locomotion, and balance/arousal, attention, and cognition/cognitive impairment

## 2020-01-12 NOTE — PHYSICAL THERAPY INITIAL EVALUATION ADULT - PERTINENT HX OF CURRENT PROBLEM, REHAB EVAL
89 y/o M with PMH HTN, HLD, afib, dementia. Presents to ED with AMS and cough. Per family, pt had several days of congested cough and URI symptoms, difficulty sleeping. Family states pt with difficulty swallowing. RVP noted to be positive for Coronavirus. CT chest with viral PNA +/- evidence of aspiration. Denies fevers, chills, SOB, wheezing, CP, pleuritic CP.

## 2020-01-12 NOTE — BEHAVIORAL HEALTH ASSESSMENT NOTE - NSBHCHARTREVIEWVS_PSY_A_CORE FT
T(C): 36.6 (01-12-20 @ 05:12), Max: 36.7 (01-11-20 @ 14:12)  HR: 70 (01-12-20 @ 05:12) (53 - 70)  BP: 165/85 (01-12-20 @ 05:12) (126/79 - 165/85)  RR: 18 (01-12-20 @ 05:12) (18 - 18)  SpO2: 98% (01-12-20 @ 05:12) (95% - 98%)  Wt(kg): --

## 2020-01-12 NOTE — PHYSICAL THERAPY INITIAL EVALUATION ADULT - ADDITIONAL COMMENTS
Pt lives with wife in a private home with 2 steps to enter & 1 flight inside. Pt dependent for ADLs. Pt with HHA 6hr/5days, daughter assists o weekend. Pt lives with wife and daughter in a private home with 2 steps to enter & 1 flight inside. Pt dependent for ADLs. Pt with HHA 6hr/5days, daughter assists on weekend. Pt requires assist with all mobility, reports pt is able to amb 1 block with assist and RW.

## 2020-01-12 NOTE — CHART NOTE - NSCHARTNOTEFT_GEN_A_CORE
Patient's procalcitonin resulted as negative  Suspect CT Chest findings representative of chronic aspiration vs. changes from coronavirus  I would continue to monitor off of antibiotics    I will sign off at this time. Please feel free to contact me with any further questions or concerns.    Manan Calles M.D.  [facility] Division of Infectious Disease  8AM-5PM: Pager Number [phone]  After Hours (or if no response): Please contact the Infectious Diseases Office at [phone]

## 2020-01-12 NOTE — PROGRESS NOTE ADULT - SUBJECTIVE AND OBJECTIVE BOX
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 No nausea, vomiting, or hematemesis; No diarrhea   NEUROLOGICAL: No headaches,     MUSCULOSKELETAL: No joint pain or swelling; No muscle, back, or extremity pain    Medications:  MEDICATIONS  (STANDING):  aspirin enteric coated 81 milliGRAM(s) Oral daily  atorvastatin 20 milliGRAM(s) Oral at bedtime  bicalutamide 50 milliGRAM(s) Oral daily  gabapentin 300 milliGRAM(s) Oral at bedtime  heparin  Injectable 5000 Unit(s) SubCutaneous every 12 hours  melatonin 3 milliGRAM(s) Oral at bedtime  pantoprazole    Tablet 40 milliGRAM(s) Oral before breakfast  QUEtiapine 25 milliGRAM(s) Oral at bedtime  sodium chloride 0.9%. 1000 milliLiter(s) (75 mL/Hr) IV Continuous <Continuous>    MEDICATIONS  (PRN):    	    PHYSICAL EXAM:  T(C): 36.6 (01-12-20 @ 05:12), Max: 36.7 (01-11-20 @ 14:12)  HR: 70 (01-12-20 @ 05:12) (53 - 70)  BP: 165/85 (01-12-20 @ 05:12) (126/79 - 165/85)  RR: 18 (01-12-20 @ 05:12) (18 - 18)  SpO2: 98% (01-12-20 @ 05:12) (95% - 98%)  Wt(kg): --  I&O's Summary    11 Jan 2020 07:01  -  12 Jan 2020 07:00  --------------------------------------------------------  IN: 1370 mL / OUT: 400 mL / NET: 970 mL        Appearance: Normal	  HEENT:   Normal oral mucosa, EOMI	    Cardiovascular: Normal S1 S2, No JVD,   Respiratory: dec bs   Gastrointestinal:  Soft, Non-tender, + BS	    Neurologic: Non-focal  Extremities:dec rom   Vascular: Peripheral pulses palpable     TELEMETRY: 	    ECG:  	  RADIOLOGY:  OTHER: 	  	  LABS:	 	    CARDIAC MARKERS:                                11.9   4.32  )-----------( 113      ( 11 Jan 2020 10:33 )             36.8     01-12    140  |  106  |  19  ----------------------------<  81  4.0   |  22  |  0.91    Ca    8.5      12 Jan 2020 07:12    TPro  7.7  /  Alb  3.8  /  TBili  0.9  /  DBili  x   /  AST  26  /  ALT  24  /  AlkPhos  91  01-10    proBNP:   Lipid Profile:   HgA1c:   TSH:

## 2020-01-12 NOTE — DISCHARGE NOTE NURSING/CASE MANAGEMENT/SOCIAL WORK - PATIENT PORTAL LINK FT
You can access the FollowMyHealth Patient Portal offered by [facility] by registering at the following website: [email or web address]. By joining [facility]’s FollowMyHealth portal, you will also be able to view your health information using other applications (apps) compatible with our system.

## 2020-01-13 LAB
-  COAGULASE NEGATIVE STAPHYLOCOCCUS: SIGNIFICANT CHANGE UP
CULTURE RESULTS: SIGNIFICANT CHANGE UP
METHOD TYPE: SIGNIFICANT CHANGE UP
ORGANISM # SPEC MICROSCOPIC CNT: SIGNIFICANT CHANGE UP
ORGANISM # SPEC MICROSCOPIC CNT: SIGNIFICANT CHANGE UP
SPECIMEN SOURCE: SIGNIFICANT CHANGE UP

## 2020-01-13 NOTE — CHART NOTE - NSCHARTNOTEFT_GEN_A_CORE
NP note - blood culture results, final results coag negative staph, contaminant.  Dr. Alberto aware.  No further work up necessary.  CAR Tapia NP

## 2020-01-15 LAB
CULTURE RESULTS: SIGNIFICANT CHANGE UP
SPECIMEN SOURCE: SIGNIFICANT CHANGE UP

## 2020-01-16 ENCOUNTER — APPOINTMENT (OUTPATIENT)
Dept: CARE COORDINATION | Facility: HOME HEALTH | Age: 85
End: 2020-01-16
Payer: MEDICARE

## 2020-01-16 VITALS
DIASTOLIC BLOOD PRESSURE: 52 MMHG | OXYGEN SATURATION: 95 % | SYSTOLIC BLOOD PRESSURE: 106 MMHG | HEART RATE: 57 BPM | TEMPERATURE: 98.2 F | RESPIRATION RATE: 15 BRPM

## 2020-01-16 PROCEDURE — 99496 TRANSJ CARE MGMT HIGH F2F 7D: CPT

## 2020-01-16 RX ORDER — FUROSEMIDE 20 MG/1
20 TABLET ORAL
Qty: 30 | Refills: 0 | Status: DISCONTINUED | COMMUNITY
Start: 2018-05-03 | End: 2020-01-16

## 2020-01-16 RX ORDER — CLOPIDOGREL BISULFATE 75 MG/1
75 TABLET, FILM COATED ORAL
Qty: 30 | Refills: 5 | Status: DISCONTINUED | COMMUNITY
Start: 2018-10-18 | End: 2020-01-16

## 2020-01-16 NOTE — HISTORY OF PRESENT ILLNESS
[Spouse] : spouse [Family Member] : family member [FreeTextEntry1] : 1. Lethargy\par 2. s/p hospitalization for corona virus (1/10-1/12), home bound due to limited functional level and dementia [de-identified] : Mr Bishop is 89y/o poor historian male with HTN, HLD, dementia, atrial fibrillation (not on AC for high risk for fall) brought in by family for altered mental status and decreased responsiveness and cough. Pt found to have coronavirus and possible aspiration PNA, chest CT reveals bilateral pleaural effusion and bibasilar groundglass opacity. Pt was consulted by infectious disease, pulmonology and reeived swallowing eval at bedside. as pt remaining afebrile and WBC remained WNL during hospitalization, plan was to be off antibiotics and to provide supportive care. \par Earlier today, pt's daughter Nora reported that pt is very lethargic, unable to swallow any food, pt was seen at home for a follow-up.\par Pt sitting in sofa with 2 daughters, a family friend, HHA and spouse. falling asleep, yet easily arousable, also drooling,

## 2020-01-16 NOTE — CURRENT MEDS
[Takes medication as prescribed] : takes [None] : Patient does not have any barriers to medication adherence [FreeTextEntry1] : Pt discharged on 150mg seroquel and has lengthy history on use of Triazolam, melatonin 12mg for insomnia.\par But due to lethary pt is now on 50mg seroquel and melatonin

## 2020-01-16 NOTE — PHYSICAL EXAM
[No Acute Distress] : no acute distress [Well Nourished] : well nourished [Well Developed] : well developed [Ill-Appearing] : ill-appearing [Cachectic] : cachexia was observed [Normal Sclera/Conjunctiva] : normal sclera/conjunctiva [PERRL] : pupils equal round and reactive to light [EOMI] : extraocular movements intact [Normal Outer Ear/Nose] : the outer ears and nose were normal in appearance [Normal Oropharynx] : the oropharynx was normal [No JVD] : no jugular venous distention [No Lymphadenopathy] : no lymphadenopathy [Supple] : supple [Thyroid Normal, No Nodules] : the thyroid was normal and there were no nodules present [No Respiratory Distress] : no respiratory distress  [No Accessory Muscle Use] : no accessory muscle use [Normal S1, S2] : normal S1 and S2 [No Murmur] : no murmur heard [No Carotid Bruits] : no carotid bruits [No Abdominal Bruit] : a ~M bruit was not heard ~T in the abdomen [No Varicosities] : no varicosities [Pedal Pulses Present] : the pedal pulses are present [No Edema] : there was no peripheral edema [No Palpable Aorta] : no palpable aorta [No Extremity Clubbing/Cyanosis] : no extremity clubbing/cyanosis [Soft] : abdomen soft [Non Tender] : non-tender [Non-distended] : non-distended [No Masses] : no abdominal mass palpated [No HSM] : no HSM [Normal Bowel Sounds] : normal bowel sounds [Normal Posterior Cervical Nodes] : no posterior cervical lymphadenopathy [Normal Anterior Cervical Nodes] : no anterior cervical lymphadenopathy [No CVA Tenderness] : no CVA  tenderness [No Spinal Tenderness] : no spinal tenderness [No Joint Swelling] : no joint swelling [Grossly Normal Strength/Tone] : grossly normal strength/tone [No Rash] : no rash [No Focal Deficits] : no focal deficits [Normal Gait] : normal gait [Deep Tendon Reflexes (DTR)] : deep tendon reflexes were 2+ and symmetric [Normal Affect] : the affect was normal [Normal Insight/Judgement] : insight and judgment were intact [de-identified] : diminished lung sounds at base, LML with coarse crackles [de-identified] : irregular bradycardia in 50s [de-identified] : AOx2, unable to recognize one of daughters

## 2020-01-16 NOTE — COUNSELING
[Fall prevention counseling provided] : Fall prevention counseling provided [Adequate lighting] : Adequate lighting [No throw rugs] : No throw rugs [Use proper foot wear] : Use proper foot wear [de-identified] : Aspiration precaution\par Continue using thickener for honey consistency\par Use spoon, cueing to swallow\par Tilt down chin to swallow.\par Remain upright 30mins post meal  [Other: ____] : [unfilled] [Good understanding] : Patient has a good understanding of lifestyle changes and steps needed to achieve self management goal [de-identified] : Pt's limited mental status, family and formal caregiver educated on aspiration precaustion

## 2020-01-16 NOTE — HEALTH RISK ASSESSMENT
[] : No [No] : No [No falls in past year] : Patient reported no falls in the past year [FreeTextEntry1] : unable to assess

## 2020-01-16 NOTE — ASSESSMENT
[FreeTextEntry1] : Ms. Bishop seen at home, Pt was very drowsy/tired, constantly falling asleep while sitting in sofa, and drooling\par Easily arousable with tactile stimulus, Per daughter Pt is a little more alert than this morning when daughter contacted CN for lethargy, Pt had a bowl of soup which was thickened. Pt has been on multiple meds to sleep at night, daughters reporting that pt has not been sleeping, but spouse reports pt has been sleeping better since discharge, but very sleepy until midday, than more alert in the afternoon. \par Family was advised not to give multiple meds for sleep, which causes worsening functional level and increased risk for aspiration as family forcefully trying to feed while pt is not fully awake. \par Recommended to given seroquel 50mg after dinner around 6pm-jovita, then wait until 8-9pm to ADD melatonin 6mg only if needed instead of giving seroquel and melatonin 12mg together at 9pm, sleep and wake cycle disturbance is also a sign of dementia and aging explained. family educated not to force feeding pt while pt is not fully awake, \par Pt noted to have irregular bradycardia, which seems asymptomatic. diminished lungs sounds at adrian base, adrian midlung with crackles. although pt satting 94-95% on RA, repeat chest xray ordered as STAT to confirm that opacity is not worsening from previous study \par House calls suggested, family agreed on House calls, \par Pt is starting PT, OT and speech therapy with [place] this week. \par

## 2020-01-17 ENCOUNTER — NON-APPOINTMENT (OUTPATIENT)
Age: 85
End: 2020-01-17

## 2020-01-17 ENCOUNTER — APPOINTMENT (OUTPATIENT)
Dept: CARDIOLOGY | Facility: CLINIC | Age: 85
End: 2020-01-17
Payer: MEDICARE

## 2020-01-17 VITALS
HEART RATE: 74 BPM | DIASTOLIC BLOOD PRESSURE: 72 MMHG | OXYGEN SATURATION: 99 % | SYSTOLIC BLOOD PRESSURE: 120 MMHG | HEIGHT: 65 IN | BODY MASS INDEX: 22.82 KG/M2 | WEIGHT: 137 LBS

## 2020-01-17 VITALS — SYSTOLIC BLOOD PRESSURE: 110 MMHG | HEART RATE: 52 BPM | DIASTOLIC BLOOD PRESSURE: 58 MMHG

## 2020-01-17 PROCEDURE — 99215 OFFICE O/P EST HI 40 MIN: CPT

## 2020-01-17 PROCEDURE — 93000 ELECTROCARDIOGRAM COMPLETE: CPT

## 2020-01-17 PROCEDURE — 36415 COLL VENOUS BLD VENIPUNCTURE: CPT

## 2020-01-17 NOTE — PHYSICAL EXAM
[General Appearance - Well Developed] : well developed [Well Groomed] : well groomed [Normal Appearance] : normal appearance [General Appearance - Well Nourished] : well nourished [Normal Conjunctiva] : the conjunctiva exhibited no abnormalities [General Appearance - In No Acute Distress] : no acute distress [No Deformities] : no deformities [No Oral Pallor] : no oral pallor [Normal Oral Mucosa] : normal oral mucosa [Eyelids - No Xanthelasma] : the eyelids demonstrated no xanthelasmas [Normal Jugular Venous A Waves Present] : normal jugular venous A waves present [No Oral Cyanosis] : no oral cyanosis [Normal Jugular Venous V Waves Present] : normal jugular venous V waves present [No Jugular Venous Rabago A Waves] : no jugular venous rabago A waves [Auscultation Breath Sounds / Voice Sounds] : lungs were clear to auscultation bilaterally [Exaggerated Use Of Accessory Muscles For Inspiration] : no accessory muscle use [Respiration, Rhythm And Depth] : normal respiratory rhythm and effort [Heart Rate And Rhythm] : heart rate and rhythm were normal [Heart Sounds] : normal S1 and S2 [Murmurs] : no murmurs present [Abdomen Soft] : soft [Abdomen Mass (___ Cm)] : no abdominal mass palpated [Abdomen Tenderness] : non-tender [Nail Clubbing] : no clubbing of the fingernails [Cyanosis, Localized] : no localized cyanosis [Petechial Hemorrhages (___cm)] : no petechial hemorrhages [Skin Turgor] : normal skin turgor [Skin Color & Pigmentation] : normal skin color and pigmentation [] : no rash [No Venous Stasis] : no venous stasis [No Skin Ulcers] : no skin ulcer [Skin Lesions] : no skin lesions [No Xanthoma] : no  xanthoma was observed [No Anxiety] : not feeling anxious [Mood] : the mood was normal [Oriented To Time, Place, And Person] : oriented to person, place, and time [Affect] : the affect was normal [FreeTextEntry1] : Depressed and/or lethargic

## 2020-01-17 NOTE — HISTORY OF PRESENT ILLNESS
[FreeTextEntry1] : (MED HX) The patient's history goes back to 1986 when he had an MI and had a cardiac catheterization which reportedly showed an occluded right coronary artery only. No intervention was done. He had another catheterization in 1997, possibly after another MI which showed a 30% proximal LAD with an ulcerated plaque, an occluded obtuse marginal filling via collaterals, and the old occluded right coronary artery filling via collaterals. He became my patient in 2001 and seemed to have some angina at that time but stable. He also has hypertension and hyperlipidemia. He's had a number of stress tests over the years and they've all shown partially fixed and partially reversible areas mostly inferior and inferolaterally. His left ventricular ejection fractions have always been adequate. He has mild-to-moderate aortic insufficiency and at least moderate mitral regurgitation on echo. He has gastritis and had H. pylori back in 2003.\par On his  visit in July of 2012 he seemed to be complaining of more angina and in addition his blood pressure had been elevated the last few times. He had switched from Diovan 320 to losartan 100 due to insurance issues. I added amlodipine and then we switched to felodipine due to constipation. However he has remained with constipation and when he takes a laxative to relieve the constipation he gets explosive watery diarrhea. His blood pressure at home has at times been around 140 where as in the office at 160-170. His angina is very predictable, is relieved virtually instantly by nitroglycerin, and always comes on at the same spot at the same time every Saturday afternoon. Otherwise he is doing well without symptoms. Based on this I stopped his felodipine on the March visit about 6 weeks ago and he returned in April for followup. His constipation and diarrhea have been mostly improved although he was in [place] over Pass[place] with a different diet. (His wife had issues with high fever from a virus and went to the hospital but seems to be improving now. This did cause some stress for the patient.) He brought his wrist blood pressure machine here. He claims at home it is usually in the 130s. Here it was 147/70 while my reading was 150/68. However when he tried it again a few minutes later it read 198/89. His exam and blood pressure here was unremarkable. He was reassured and told to come back in 2 months. In the interim I believe his family finally found a primary care doctor and he did a few tests, and gave him the number of vitamins to take.  \par September 12, 2013 He is here today for routine followup, complaining of being listless, having less energy and vague pains in his chest and back, tender to touch. He does not have exertional symptoms. He is wondering if it is at all from aging. His constipation is better but it just takes him a long time to have a bowel movement. It wasn't clear if his constipation was better by stopping the amlodipine. He was a little bradycardic and I consider cutting back the atenolol especially if he got worse in the future.\par March 25, 2014. The patient is here for followup. His blood pressure remains somewhat elevated in the 150-160 range. He still remains with constipation and maybe is willing to finally see a GI doctor. It is not complaining of any angina today\par December 4, 2014. Patient is here for followup. His main complaint is still feeling tired and not enough energy. He does not have much in the way of exertional chest pain or shortness of breath. He is not complaining of constipation. He has mild edema but is chronic and he does not think it is worse since going back on the amlodipine. He is thinking of trying Xanax instead of Halcion for sleeping. He had a flu shot already and he had a pneumonia shot a number of years ago but certainly later than 65 years old.\par July 13, 2015. First visit in over 7 months.  No angina per se. He has some issues with dizziness and losing his balance and occasional lightheadedness. He is a very difficult historian. His resting ECG had a heart rate of 51 today but otherwise everything is unchanged. I had already cut his atenolol down from 50-25 in the past. He complains of other symptoms with some numbness and tingling that go up his leg and then both buttocks and lower back which I explained are arthritis/spinal symptoms.\par February 11, 2016. First visit since July. He went to an internist Dr. Martin who did labs about 2 weeks ago and said everything was okay although the patient said some of the values on the report he got were high. His only complaint is that he is much more tired when he is walking and has less aerobic capacity. No exertional chest pain and not exactly shortness of breath. No PND or orthopnea. There is some ankle edema. He claims no change in his medication.\par March 1, 2016. Patient came for echocardiogram. Mild to moderate MR and mild to moderate AI. Normal left atrium. Mild segmental LV systolic dysfunction with an akinetic inferior wall and inferoseptum basal inferior akinetic. Normal LVEF of 55%. Normal RV size and function with mild pulmonic regurg. No change from August of 2005.\par August 3, 2016. The patient is here in followup.  The daughter is mostly concerned because her father has edema for about a month and a half now and she doesn't think it is just from the hot, humid weather. The patient has no complaints of chest pain or shortness of breath with exertion. His biggest problem has been the onset of numbness in his left leg from his mid shin to his foot, not on the right side. He was sent by Dr. Batista to a neurologist, who told him to use a walker and sent him a physical therapist.  The physical therapist eventually told not to do too much walking. He may be on gabapentin.\par December 13, 2016. The patient is here in followup. He mentions that it is now 30 years since he had his heart attack and he is doing well and wants a guarantee for another 30 years. On the whole no complaints. He walks at least a half hour every day and does get tired, but no chest pain. No change in his medications.\par April 24, 2017. Patient is here in followup. He is feeling fine with no symptoms of bradycardia despite a heart rate of about 48-58 and an occasional slight pause. The rest of his complaints are nonspecific, he needs a walker, urinates a little too much, mild chronic pedal edema, etc. No angina, no syncope, or near syncope. Blood pressure and exam are unchanged. He needs clearance for cataract surgery with Dr. Abdirashid Gray.\par August 28, 2017. Patient here in followup. Complains about some double vision ever since the cataract surgery and he still has back and leg pains when he walks. No claudication, no chest pain or shortness of breath. BP control is excellent on only a half of amlodipine 5 mg only has mild edema. Labs from his internist, mostly within normal limits. BUN 27, creatinine 1.3, potassium 4.5. Cholesterol 183, triglycerides 227, HDL 67, LDL 71.\par November 2017. Patient's urologist called that he had a prostate nodule and a PSA of 200. Wanted to change the Plavix to aspirin so he could do a biopsy\par February 20, 2018. Patient is here in followup. No prostate biopsy, but instead he was started on Lupron injections. No other complaints, but on EKG he is in atrial flutter with a ventricular rate in the 60s. No other change with right bundle branch block. Patient with known underlying conduction disease. No exertional chest pain or shortness of breath. Decided to cl Toprol 15 mg daily. Followup in one month and then consider sotalol or Multaq or amio to try to get out of atrial flutter in the hope of stopping anticoagulation at some point.\par March 8, 2018. Patient sent for semiurgent visit after urologist noticed the edema and told him he must see his cardiologist. No change in symptoms or complaints but he does notice more swelling. Also concerned as he has some urinary frequency and urgency already because of his prostate. Heart rhythm does seem more regular today. After discussion with patient and daughter elected to put him on low dose Aldactazide just every other day and partly on a p.r.n. basis and then reassess. His renal function, electrolytes will be reassessed along with his atrial fibrillation on his upcoming visit.\par March 20, 2018. Patient returns now on Aldactazide. Remains in atrial flutter and rest of the EKG unchanged. He has now been anticoagulated for over 4 weeks. He has a UTI, according to the urologist and is now on sulfa. His edema is much better. Long discussion with patient and his daughter here and his other daughter on the phone about the pros and cons of trying to convert him to sinus rhythm given his underlying bradycardia. It was elected to just leave him in atrial flutter, so we don't have to deal with significant bradycardia. His labs came back with an elevated BUN and creatinine and potassium of 5.7. His losartan was stopped and his Aldactazide was changed to Monday, Wednesday, Friday.\par April 9, 2018. Patient now returns in followup. Remains in atrial flutter with mostly 4:1 block with a ventricular rate of 70. Looks somewhat pale, but denies any dark stool. He feels somewhat weak, but is not orthostatic; in fact, the daughter did not give him any of the diuretic so has been off Aldactazide, and losartan the last 2 weeks to 3 weeks. He did see a urologist and he did diagnose a urine infection and gave him Sulfa. He does have some trouble urinating, and sometimes issues with urgency. He has a little trouble walking, with some weakness in his left leg, but if he sits for a few minutes and then gets up, he is much better. No chest pain or shortness of breath. Still with edema.\par May 7, 2018. The patient is here in followup. Around April 14 he was admitted with shortness of breath and bronchospasm and worsening CHF. Was found to be positive for parainfluenza. Treated and gradually improved. Echocardiogram showed LVEF 60% with mild to moderate MR, minimal AS and minimal AI, normal left atrium, mild segmental LV systolic dysfunction, with normal RV size and function. Normal diastolic function. Mild TR the RVSP 28. Patient was discharged with a BUN of 16 a creatinine of 1.16 and potassium of 4.2. He remained on atenolol, atorvastatin, Flomax, Xarelto 15.. He was also found to have MRSA in the urine and was discharged on Bactrim DS. He has been doing better and actually went to [employer] on Saturday and was almost himself. He is here today in followup. He seems a little out of it, a little lethargic. Skin turgor seems to be good. He had been on Halcion while in the hospital has gone back to work plus was taking Xanax at times. He is on Lasix daily.\par June 5, 2018. Patient here in followup. Family finally recognized he does better when he does not take the diuretic and he also does better now that they stopped his Triazolem. He sleeps well with just 0.5 Xanax. He is tolerating the edema. His biggest problem is when he finally has to urinate it comes out so fast he has trouble controlling it. Urology suggested he urinate every 2 hours even if he does not feel the urge. He does seem more back to himself and was able to nehemias on Shavuot. His gait is a little bit wide-based, now, which is probably helping his balance. He did have one fall and at urgent care they did a CAT scan, which showed a moderate pleural effusion on the left and small on the right, which is acceptable.\par August 8, 2018. Patient in followup. Seems to be doing well overall and stable. Does get out, but not as much as he used to. He does complain about his urinating but they will be seeing the urologist soon. EKG he is in atrial flutter with a ventricular rate of 58. Tolerating his mild to moderate edema, excellent blood pressure. Now on Serroquel.\par October 2018. Patient here in followup. Actually, in sinus rhythm, with marked first-degree AV block this time. Ventricular rate 60.Had a fall on "[place]", tripped near his chair with facial trauma. Otherwise, doing okay, but having trouble sleeping at night and is sleeping during the day. After the fall both urologist and his primary care felt it was too dangerous to remain on his Xarelto so this was stopped. Luckily, he is back in sinus rhythm. He had labs October 9 with a normal CBC, normal thyroid function, normal chemistries, including a BUN 28, and creatinine of 1.1, potassium 4.2 sedimentation rate 58, cholesterol 181, HDL 62, LDL 68. . Hemoglobin A1c is 6.2.\par December 20, 2018. 2 weekends ago the patient was found to be unable to be aroused on Shabbos. He was taken to [facility] emergency room. He was mildly bradycardic and hypotensive and seemed to respond to fluids, etc. It was not felt that he needed a pacemaker and the family insisted on taking him home. I spoke with them and had them hold the atenolol. Seems to be more alert and better since then, although recently had some congestion and was given extra Lasix and then they decided to bring him here today because they were nervous. Pulse has been between 70 and 90 most of the time. \par March 26, 2019. Patient returns in followup. Remains in sinus rhythm at 61 with a first degree AV block and right bundle branch block. PSA which had been way down went back up to 25 and Dr. Sunshine put him back on Casodex. The family declined Lupron. Patient sleeping somewhat better, but not perfect. Using melatonin, Serroquel, and at times Tamezpam and at times, Halcion. Has an ulcer at the bottom of his feet that is being cared for by podiatry. His bone pain that is probably from metastatic disease, but they don't see any point in doing a bone scan, etc.\par June 25, 2019. Patient here in followup. Is in sinus rhythm with a first-degree AV block. His family called yesterday concerned that the physical therapist thought his blood pressure is 102/37, but I reassured them. Overall he is doing well, but his biggest concern is he is unsteadiness and he doesn't go out anywhere because he is afraid to fall. I tried to encourage him and say that as long as he has somebody with him he should be okay, but he has issues with his independence as well. We also talked about cutting back on the serroquel or sleeping medications to see if that helps. He recently had 3+ microscopic hematuria with Dr. Sunshine, and this will be repeated. If it persists, perhaps his Plavix will be changed to baby aspirin. He is not on full anticoagulation. No chest pain, shortness of breath, dizziness, etc.\par January 17, 2020.  First visit with the patient since June of last year although I have occasionally been in touch with the family about him.  Last weekend he was brought to the hospital because he was unresponsive but was just difficult to wake him up because he had a viral illness on top of much of his medication.  He was evaluated over the weekend and sent home Sunday night.  His medicines seem to be aspirin atorvastatin Flomax gabapentin 600 at bedtime melatonin 1 at bedtime Seroquel 50 at bedtime instead of 150 at bedtime and off triazolam 0.125 at bedtime.  He was seen by a visiting nurse who had him have a chest x-ray that reportedly shows an infiltrate and she requested follow-up lab work.  He does have issues with aspiration and is now on thickened nectar only.  He remains in atrial flutter with a ventricular rate in the 50s

## 2020-01-17 NOTE — CARDIOLOGY SUMMARY
[Fixed Defect] : fixed defect [___] : [unfilled] [Mild] : mild LV dysfunction [None] : no pulmonary hypertension [Moderate] : moderate mitral regurgitation [Normal] : normal LA size

## 2020-01-17 NOTE — REVIEW OF SYSTEMS
[Feeling Fatigued] : feeling fatigued [Recent Weight Loss (___ Lbs)] : recent [unfilled] ~Ulb weight loss [Cough] : cough [Joint Pain] : joint pain [see HPI] : see HPI [Depression] : depression [Negative] : Psychiatric [Fever] : no fever [Recent Weight Gain (___ Lbs)] : no recent weight gain [Chills] : no chills [Shortness Of Breath] : no shortness of breath [Dyspnea on exertion] : not dyspnea during exertion [Chest  Pressure] : no chest pressure [Chest Pain] : no chest pain [Palpitations] : no palpitations [Lower Ext Edema] : no extremity edema [Change In The Stool] : no change in stool [Heartburn] : no heartburn [Abdominal Pain] : no abdominal pain [Urinary Frequency] : no change in urinary frequency [Change In Color Of Skin] : change in skin color [Numbness (Hypesthesia)] : no numbness [Skin Lesions] : no skin lesions [Dizziness] : no dizziness [Anxiety] : no anxiety [Confusion] : no confusion was observed [Under Stress] : not under stress [Suicidal] : not suicidal

## 2020-01-20 LAB
ALBUMIN SERPL ELPH-MCNC: 3.9 G/DL
ALP BLD-CCNC: 76 U/L
ALT SERPL-CCNC: 22 U/L
ANION GAP SERPL CALC-SCNC: 13 MMOL/L
AST SERPL-CCNC: 22 U/L
BASOPHILS # BLD AUTO: 0.03 K/UL
BASOPHILS NFR BLD AUTO: 0.5 %
BILIRUB SERPL-MCNC: 1.1 MG/DL
BUN SERPL-MCNC: 21 MG/DL
CALCIUM SERPL-MCNC: 9.2 MG/DL
CHLORIDE SERPL-SCNC: 104 MMOL/L
CHOLEST SERPL-MCNC: 123 MG/DL
CHOLEST/HDLC SERPL: 2.4 RATIO
CO2 SERPL-SCNC: 25 MMOL/L
CREAT SERPL-MCNC: 1.15 MG/DL
EOSINOPHIL # BLD AUTO: 0.17 K/UL
EOSINOPHIL NFR BLD AUTO: 2.7 %
ESTIMATED AVERAGE GLUCOSE: 123 MG/DL
GLUCOSE SERPL-MCNC: 87 MG/DL
HBA1C MFR BLD HPLC: 5.9 %
HCT VFR BLD CALC: 39.7 %
HDLC SERPL-MCNC: 51 MG/DL
HGB BLD-MCNC: 12.7 G/DL
IMM GRANULOCYTES NFR BLD AUTO: 0.3 %
LDLC SERPL CALC-MCNC: 44 MG/DL
LYMPHOCYTES # BLD AUTO: 1.66 K/UL
LYMPHOCYTES NFR BLD AUTO: 26.5 %
MAN DIFF?: NORMAL
MCHC RBC-ENTMCNC: 30.5 PG
MCHC RBC-ENTMCNC: 32 GM/DL
MCV RBC AUTO: 95.4 FL
MONOCYTES # BLD AUTO: 0.54 K/UL
MONOCYTES NFR BLD AUTO: 8.6 %
NEUTROPHILS # BLD AUTO: 3.84 K/UL
NEUTROPHILS NFR BLD AUTO: 61.4 %
NT-PROBNP SERPL-MCNC: 837 PG/ML
PLATELET # BLD AUTO: 151 K/UL
POTASSIUM SERPL-SCNC: 4.4 MMOL/L
PROCALCITONIN SERPL-MCNC: 0.05 NG/ML
PROT SERPL-MCNC: 6.6 G/DL
PSA SERPL-MCNC: 3.91 NG/ML
RBC # BLD: 4.16 M/UL
RBC # FLD: 13.7 %
SODIUM SERPL-SCNC: 142 MMOL/L
TRIGL SERPL-MCNC: 141 MG/DL
TSH SERPL-ACNC: 2.26 UIU/ML
WBC # FLD AUTO: 6.26 K/UL

## 2020-01-29 ENCOUNTER — APPOINTMENT (OUTPATIENT)
Dept: HOME HEALTH SERVICES | Facility: HOME HEALTH | Age: 85
End: 2020-01-29
Payer: MEDICARE

## 2020-01-29 VITALS
TEMPERATURE: 98.1 F | RESPIRATION RATE: 18 BRPM | OXYGEN SATURATION: 90 % | DIASTOLIC BLOOD PRESSURE: 72 MMHG | SYSTOLIC BLOOD PRESSURE: 134 MMHG | HEART RATE: 85 BPM

## 2020-01-29 VITALS
HEART RATE: 85 BPM | SYSTOLIC BLOOD PRESSURE: 134 MMHG | DIASTOLIC BLOOD PRESSURE: 72 MMHG | RESPIRATION RATE: 18 BRPM | OXYGEN SATURATION: 90 % | WEIGHT: 135 LBS | BODY MASS INDEX: 22.47 KG/M2 | TEMPERATURE: 98.1 F

## 2020-01-29 DIAGNOSIS — Z87.19 PERSONAL HISTORY OF OTHER DISEASES OF THE DIGESTIVE SYSTEM: ICD-10-CM

## 2020-01-29 DIAGNOSIS — I87.2 VENOUS INSUFFICIENCY (CHRONIC) (PERIPHERAL): ICD-10-CM

## 2020-01-29 DIAGNOSIS — H81.10 BENIGN PAROXYSMAL VERTIGO, UNSPECIFIED EAR: ICD-10-CM

## 2020-01-29 DIAGNOSIS — I25.2 OLD MYOCARDIAL INFARCTION: ICD-10-CM

## 2020-01-29 DIAGNOSIS — Z87.448 PERSONAL HISTORY OF OTHER DISEASES OF URINARY SYSTEM: ICD-10-CM

## 2020-01-29 DIAGNOSIS — Z86.39 PERSONAL HISTORY OF OTHER ENDOCRINE, NUTRITIONAL AND METABOLIC DISEASE: ICD-10-CM

## 2020-01-29 DIAGNOSIS — R00.1 BRADYCARDIA, UNSPECIFIED: ICD-10-CM

## 2020-01-29 DIAGNOSIS — J90 PLEURAL EFFUSION, NOT ELSEWHERE CLASSIFIED: ICD-10-CM

## 2020-01-29 DIAGNOSIS — Z86.79 PERSONAL HISTORY OF OTHER DISEASES OF THE CIRCULATORY SYSTEM: ICD-10-CM

## 2020-01-29 DIAGNOSIS — Z87.898 PERSONAL HISTORY OF OTHER SPECIFIED CONDITIONS: ICD-10-CM

## 2020-01-29 DIAGNOSIS — Z71.89 OTHER SPECIFIED COUNSELING: ICD-10-CM

## 2020-01-29 DIAGNOSIS — Z01.818 ENCOUNTER FOR OTHER PREPROCEDURAL EXAMINATION: ICD-10-CM

## 2020-01-29 DIAGNOSIS — D64.9 ANEMIA, UNSPECIFIED: ICD-10-CM

## 2020-01-29 DIAGNOSIS — R73.09 OTHER ABNORMAL GLUCOSE: ICD-10-CM

## 2020-01-29 DIAGNOSIS — B34.8 OTHER VIRAL INFECTIONS OF UNSPECIFIED SITE: ICD-10-CM

## 2020-01-29 DIAGNOSIS — B34.2 CORONAVIRUS INFECTION, UNSPECIFIED: ICD-10-CM

## 2020-01-29 DIAGNOSIS — R60.0 LOCALIZED EDEMA: ICD-10-CM

## 2020-01-29 DIAGNOSIS — I35.1 NONRHEUMATIC AORTIC (VALVE) INSUFFICIENCY: ICD-10-CM

## 2020-01-29 DIAGNOSIS — Z87.01 PERSONAL HISTORY OF PNEUMONIA (RECURRENT): ICD-10-CM

## 2020-01-29 DIAGNOSIS — R31.29 OTHER MICROSCOPIC HEMATURIA: ICD-10-CM

## 2020-01-29 PROCEDURE — G0506: CPT

## 2020-01-29 PROCEDURE — 99497 ADVNCD CARE PLAN 30 MIN: CPT

## 2020-01-29 PROCEDURE — 99345 HOME/RES VST NEW HIGH MDM 75: CPT | Mod: 25

## 2020-01-29 PROCEDURE — 99498 ADVNCD CARE PLAN ADDL 30 MIN: CPT | Mod: 33

## 2020-01-29 NOTE — REVIEW OF SYSTEMS
[Incontinence] : incontinence [Memory Loss] : memory loss [Negative] : Heme/Lymph [FreeTextEntry1] : limited ROS due to advanced dementia

## 2020-01-29 NOTE — HISTORY OF PRESENT ILLNESS
[Patient] : patient [Formal Caregiver] : formal caregiver [Spouse] : spouse [FreeTextEntry1] : gait instability [FreeTextEntry2] : patient is a 89 y/o  male with hx HTN, HLD, dementia with behavior problems, atrial fibrillation (not on AC for high risk for fall), gait instability, dysphagia with aspiration, recent hospital visit 1/2020 for parainfluenza and coronavirus, CT showed bilateral pleural effusions, seen for initial visit.\par \par patient has been doing ok at baseline with no active complaints. as per spouse patient is recovering slowly from recent viral illness. he is awake, and responsive. appetite has also been improving, and returning to baseline.\par denies nausea, vomiting, fever, chills, SOB, urinary complaints.\par eats soft pureed food, has dysphagia with history of silent aspiration, only fed while awake as per spouse\par reports  good appetite, weight has been declining, lost almost 10 lbs unintentional over the past year, baseline weight is 145 lbs.\par BM is regular, goes every other day at baseline. last BM was today , takes laxatives only if needed, has miralax\par sleep problems are chronic and persisting, worsening with dementia, on seroquel, melatonin and trizolam. patient was advised to cut back on seroquel due to excessive somnolence, however spouse states that he is now mostly awake early morning, and is more interactive. sleep is variable with good and bad days\par memory problems for the past few years, declined quickly over the past year, does not recognize family members at times including spouse whom he has been  for over 70 years\par behavior is calm, however can become agitated any time of the day with hallucinations and delusions, spouse states that he wakes up while dreaming\par mood is agitated at times, managed on redirection and seroquel\par skin is intact, has new skin ulcer right big toe, Mx by podiatrist\par walks with  walker and full personal assistance, gait is unstable,  multiple falls over the past few months, last one 2 weeks ago mainly while walking, no major injuries\par \par dementia with behavior problems- advanced with swallowing, behavior and ambulatory problems. main decline over the past year, recent episode of aspiration without pneumonia, also weight loss of 10 lbs. family refused MBS while at hospital, was recommended thickened fluids.\par \par afib- not AC, follows Dr Salvador. 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

## 2020-01-29 NOTE — CHRONIC CARE ASSESSMENT
[Limited decision making ability] : limited decision making ability [None] : The patient does not exercise [Can not Exercise (Disability)] : Exercise: The patient can not exercise due to disability [Low Salt Diet] : low salt [General Adherence] : and is generally adherent [Noted Weight Loss] : noted weight loss [FAST Score: ____] : Functional Assessment Scale (FAST) Score: [unfilled]

## 2020-01-29 NOTE — REASON FOR VISIT
[Spouse] : spouse [Initial Annual Medicare Wellness Visit] : an initial annual Medicare wellness visit [Formal Caregiver] : formal caregiver [Pre-Visit Preparation] : pre-visit preparation was done [Family Member] : family member [Intercurrent Specialty/Sub-specialty Visits] : the patient has intercurrent specialty/sub-specialty visits [FreeTextEntry3] : RN CM

## 2020-01-29 NOTE — COUNSELING
[Weight counseling provided] : weight counseling provided [Medical/Nutritional supplementation as prescribed] : medical/nutritional supplementation as prescribed [Mediterranean diet recommended] : Mediterranean diet recommended [Non - Smoker] : non-smoker [Continue diet as tolerated] : continue diet as tolerated based on goals of care [Smoke/CO Detectors] : smoke/CO detectors [Use grab bars] : use grab bars [Remove clutter and unsafe carpeting to avoid falls] : remove clutter and unsafe carpeting to avoid falls [Medical alert] : medical alert [Use assistive device to avoid falls] : use assistive device to avoid falls [] : eye exam [Improve exercise tolerance] : improve exercise tolerance [Improve mobility] : improve mobility [Improve weight] : improve weight [Decrease stress] : decrease stress [Improve pain control] : improve pain control [Maintain functional ability] : maintain functional ability [Minimize unnecessary interventions] : minimize unnecessary interventions [Decrease hospital use] : decrease hospital use [Discussed disease trajectory with patient/caregiver] : discussed disease trajectory with patient/caregiver [Likely to achieve goals/desired outcomes] : likely to achieve goals/desired outcomes [Patient/Caregiver has ___ understanding of disease process] : patient/caregiver has [unfilled] understanding of disease process [Advanced Directives discussed: ____] : Advanced directives discussed: [unfilled] [ - New patient with 2 or more chronic conditions; CCM discussed and patient-centered care plan established] : New patient with 2 or more chronic conditions; CCM discussed and patient-centered care plan established [de-identified] : elvin pending

## 2020-01-29 NOTE — PHYSICAL EXAM
[No Acute Distress] : no acute distress [EOMI] : extra ocular movement intact [PERRL] : pupils equal, round and reactive to light [Supple] : the neck was supple [No JVD] : no jugular venous distention [Normal Oropharynx] : the oropharynx was normal [Clear to Auscultation] : lungs were clear to auscultation bilaterally [No Respiratory Distress] : no respiratory distress [No Accessory Muscle Use] : no accessory muscle use [Normal Rate] : heart rate was normal  [Regular Rhythm] : with a regular rhythm [No Edema] : there was no peripheral edema [Normal Bowel Sounds] : normal bowel sounds [Non Tender] : non-tender [Soft] : abdomen soft [Not Distended] : not distended [No CVA Tenderness] : no ~M costovertebral angle tenderness [No Spinal Tenderness] : no spinal tenderness [No Joint Swelling] : no joint swelling seen [No Rash] : no rash [No Motor Deficits] : the motor exam was normal [No Gross Sensory Deficits] : no gross sensory deficits [de-identified] : frial, awake, interactive, anxious [de-identified] : unstable gait even with full personal asisstance [de-identified] : 1 x 0.5 cm skin ulcer lateral base of toe nail, base covered with white debris, no surroundign erythea, [de-identified] : not oriented to time, place or person, kept on asking who provider was and when will she leave, anxious

## 2020-02-14 ENCOUNTER — RX RENEWAL (OUTPATIENT)
Age: 85
End: 2020-02-14

## 2020-03-05 DIAGNOSIS — Z78.9 OTHER SPECIFIED HEALTH STATUS: ICD-10-CM

## 2020-03-17 ENCOUNTER — APPOINTMENT (OUTPATIENT)
Dept: HOME HEALTH SERVICES | Facility: HOME HEALTH | Age: 85
End: 2020-03-17

## 2020-04-22 PROBLEM — I25.10 ASCVD (ARTERIOSCLEROTIC CARDIOVASCULAR DISEASE): Status: ACTIVE | Noted: 2018-03-20

## 2020-04-22 PROBLEM — G47.00 INSOMNIA, UNSPECIFIED TYPE: Status: ACTIVE | Noted: 2020-01-16

## 2020-04-22 NOTE — REASON FOR VISIT
[Follow-Up] : a follow-up visit [Family Member] : family member [Formal Caregiver] : formal caregiver [Intercurrent Specialty/Sub-specialty Visits] : the patient has intercurrent specialty/sub-specialty visits [Pre-Visit Preparation] : pre-visit preparation was done [FreeTextEntry3] : RN CM, urology

## 2020-04-22 NOTE — COUNSELING
[Weight counseling provided] : weight counseling provided [Medical/Nutritional supplementation as prescribed] : medical/nutritional supplementation as prescribed [Mediterranean diet recommended] : Mediterranean diet recommended [Continue diet as tolerated] : continue diet as tolerated based on goals of care [Non - Smoker] : non-smoker [Smoke/CO Detectors] : smoke/CO detectors [Use grab bars] : use grab bars [Medical alert] : medical alert [Use assistive device to avoid falls] : use assistive device to avoid falls [Remove clutter and unsafe carpeting to avoid falls] : remove clutter and unsafe carpeting to avoid falls [] : foot exam [Improve exercise tolerance] : improve exercise tolerance [Improve mobility] : improve mobility [Improve weight] : improve weight [Improve pain control] : improve pain control [Decrease stress] : decrease stress [Decrease hospital use] : decrease hospital use [Minimize unnecessary interventions] : minimize unnecessary interventions [Maintain functional ability] : maintain functional ability [Discussed disease trajectory with patient/caregiver] : discussed disease trajectory with patient/caregiver [Likely to achieve goals/desired outcomes] : likely to achieve goals/desired outcomes [Patient/Caregiver has ___ understanding of disease process] : patient/caregiver has [unfilled] understanding of disease process [Advanced Directives discussed: ____] : Advanced directives discussed: [unfilled] [de-identified] : elvin pending

## 2020-04-22 NOTE — HISTORY OF PRESENT ILLNESS
[Patient] : patient [Spouse] : spouse [Formal Caregiver] : formal caregiver [FreeTextEntry2] : patient is a 89 y/o  male with hx HTN, HLD, dementia with behavior problems, atrial fibrillation (not on AC for high risk for fall), gait instability, dysphagia with aspiration, recent hospital visit 1/2020 for parainfluenza and coronavirus, CT showed bilateral pleural effusions, seen for follow-up visit via telehealth.\par \par patient has been doing well at baseline with no new changes since previous visit. \par no active complaints as per himself, formal HHA Mason and daughter Nora.\par denies nausea, vomiting, fever, chills, SOB, urinary complaints.\par eats soft pureed food, has dysphagia with history of silent aspiration, only fed while awake as per spouse\par reports  good appetite, weight has been declining, lost almost 10 lbs unintentional over the past year, baseline weight is 145 lbs.\par BM is regular, goes every other day at baseline. last BM was today , takes miralax only if needed\par patient has chronic insomnia, sleep is variable with good and bad days, on seroquel, melatonin and trizolam. \par memory problems for the past few years, declined quickly over the past year, does not recognize family members at times including spouse whom he has been  for over 70 years\par behavior is calm, however can become agitated any time of the day with hallucinations and delusions, spouse states that he wakes up while dreaming,\par mood is ok, no hx of depression\par skin is intact, has new skin ulcer right big toe, Mx by podiatrist\par walks with  walker and full personal assistance, gait is unstable,  multiple falls over the past few months, no major injuries\par \par dementia with behavior problems- advanced with swallowing, behavior and ambulatory problems. main decline over the past year, recent episode of aspiration without pneumonia, also weight loss of 10 lbs. family refused MBS while at hospital, was recommended thickened fluids.\par \par afib- not AC, follows Dr Salvador. 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

## 2020-04-22 NOTE — CHRONIC CARE ASSESSMENT
[FAST Score: ____] : Functional Assessment Scale (FAST) Score: [unfilled] [Limited decision making ability] : limited decision making ability [None] : The patient does not exercise [Can not Exercise (Disability)] : Exercise: The patient can not exercise due to disability [Low Salt Diet] : low salt [General Adherence] : and is generally adherent [Noted Weight Loss] : noted weight loss

## 2020-04-22 NOTE — PHYSICAL EXAM
[No Acute Distress] : no acute distress [EOMI] : extra ocular movement intact [Normal Oropharynx] : the oropharynx was normal [No JVD] : no jugular venous distention [Supple] : the neck was supple [No Respiratory Distress] : no respiratory distress [No Accessory Muscle Use] : no accessory muscle use [No Edema] : there was no peripheral edema [Non Tender] : non-tender [Soft] : abdomen soft [Not Distended] : not distended [No CVA Tenderness] : no ~M costovertebral angle tenderness [No Spinal Tenderness] : no spinal tenderness [No Rash] : no rash [No Joint Swelling] : no joint swelling seen [No Gross Sensory Deficits] : no gross sensory deficits [No Motor Deficits] : the motor exam was normal [de-identified] : frail, awake, interactive, skin color good, limited exam due to telehealth [de-identified] : unstable gait even with full personal asisstance [de-identified] : not seen today [de-identified] : not oriented to time, place or person, confused at baseline

## 2020-04-22 NOTE — ASSESSMENT
[FreeTextEntry1] : Patient is being seen for a visit provided via telehealth real-time audio visual technology.\par Patient was located at their home at the time of the visit.\par The [facility] clinician, Cathy Wen, was located remotely at their home in [place] at the time of the visit. \par The patient and the [facility] clinician participated in the telehealth encounter.\par Other participants included: daughter, HHA, \par \par Patient representative consents to the use of telehealth. All questions related to telehealth answered\par

## 2020-04-28 NOTE — SWALLOW BEDSIDE ASSESSMENT ADULT - SLP PRECAUTIONS/LIMITATIONS: VISION
Continue the 40 mg of rosuvastatin and we are adding on ezetimibe 10 mg daily.  Please increase your exercise, decrease your carbohydrates and we addressed oral hygiene.  I am going to have you come in in approximately 6 weeks for laboratory studies  
impaired/Partially impaired: cannot see medication labels or newsprint, but can see obstacles in path, and the surrounding layout; can count fingers at arm's length

## 2020-07-07 PROBLEM — R26.81 GAIT INSTABILITY: Status: ACTIVE | Noted: 2020-01-29

## 2020-07-07 PROBLEM — R13.10 DYSPHAGIA, UNSPECIFIED TYPE: Status: ACTIVE | Noted: 2020-01-29

## 2020-07-07 PROBLEM — L98.499 CHRONIC ULCER OF SKIN: Status: ACTIVE | Noted: 2020-01-29

## 2020-10-11 PROBLEM — I48.92 ATRIAL FLUTTER WITH CONTROLLED RESPONSE: Status: ACTIVE | Noted: 2018-02-20

## 2020-10-11 PROBLEM — Z87.01 HISTORY OF COMMUNITY ACQUIRED PNEUMONIA: Status: RESOLVED | Noted: 2020-01-01 | Resolved: 2020-01-01

## 2020-10-11 PROBLEM — C61 PROSTATE CANCER: Status: ACTIVE | Noted: 2018-02-20

## 2020-10-11 PROBLEM — F03.91 DEMENTIA WITH BEHAVIORAL DISTURBANCE: Status: ACTIVE | Noted: 2020-01-16

## 2020-11-12 NOTE — SURGICAL HISTORY
[Follow-Up] : a follow-up evaluation of
[PSH Reviewed and Updated] : past surgical history reviewed and updated

## 2021-01-01 ENCOUNTER — RX RENEWAL (OUTPATIENT)
Age: 86
End: 2021-01-01

## 2021-04-14 ENCOUNTER — NON-APPOINTMENT (OUTPATIENT)
Age: 86
End: 2021-04-14

## 2021-08-12 NOTE — PROGRESS NOTE ADULT - SUBJECTIVE AND OBJECTIVE BOX
[facility]      CHIEF COMPLAINT    Chief Complaint   Patient presents with   • Congestion     Sore throat, cough, congestion, loss of speech for the past 4 days   • Cough   • Sore Throat       HPI    Ayanna Sanchez is a 43 year old woman who presents to the [facility] care center for for evaluation of sore throat, hoarse voice, cough, and fever.    Symptoms started on Sunday of this week.  Initially had temperature up to 100.0 but afebrile now for the past 48 hours.  Continues to have coughing that is mostly at night.  Also has a very hoarse voice and hard to speak.    No vomiting or diarrhea.  Does not feel short of breath.    She did have a PCR test for Covid 2 days ago which returned negative yesterday.  She is fully vaccinated.    Patient states today she finally feels she is improving but wants to make sure she does not have strep throat or bronchitis.    ALLERGIES    Allergies   Allergen Reactions   • Cefaclor HIVES       CURRENT MEDICATIONS    No current outpatient medications on file prior to visit.  No current facility-administered medications on file prior to visit.       PAST MEDICAL HISTORY    Hypertension    SURGICAL HISTORY    Tonsillectomy    SOCIAL HISTORY    No smoking    FAMILY HISTORY    No family history on file.    REVIEW OF SYSTEMS    Review of Systems   Constitutional: Positive for fever (Afebrile x48 hours).   HENT: Positive for congestion and voice change.    Respiratory: Positive for cough.    Cardiovascular: Negative for chest pain.   Gastrointestinal: Negative for abdominal pain, diarrhea, nausea and vomiting.   Neurological: Negative for light-headedness.      10 point review of systems otherwise negative    PHYSICAL EXAM    Visit Vitals  BP (!) 157/120   Pulse (!) 130   Temp 98.6 °F (37 °C)   Resp 16   Ht 5' 6\" (1.676 m)   Wt 90.7 kg (200 lb)   SpO2 100%   BMI 32.28 kg/m²       Physical Exam  Vitals and nursing note reviewed.    Constitutional:       General: She is not in acute distress.  HENT:      Head: Normocephalic and atraumatic.      Right Ear: Tympanic membrane normal.      Left Ear: Tympanic membrane normal.      Mouth/Throat:      Mouth: Mucous membranes are moist.      Pharynx: No oropharyngeal exudate or posterior oropharyngeal erythema.   Eyes:      Conjunctiva/sclera: Conjunctivae normal.   Cardiovascular:      Rate and Rhythm: Regular rhythm. Tachycardia present.      Heart sounds: No murmur heard.     Pulmonary:      Effort: Pulmonary effort is normal. No respiratory distress.      Breath sounds: Normal breath sounds.   Musculoskeletal:      Cervical back: Neck supple.   Lymphadenopathy:      Cervical: No cervical adenopathy.   Skin:     General: Skin is warm and dry.   Neurological:      Mental Status: She is alert and oriented to person, place, and time.   Psychiatric:         Mood and Affect: Mood normal.                  LABS    Results for orders placed or performed in visit on 08/12/21   POCT RAPID STREP A   Result Value    GRP A STREP Negative    Internal Procedural Controls Acceptable Yes              Orders Placed This Encounter   • POCT Rapid strep A   • STREPTOCOCCUS GROUP A (STREPTOCOCCUS PYOGENES), BACTERIAL CULTURE      9:53 AM  Strep test negative.  Negative Covid test from 2 days ago.  Symptoms most consistent with URI with coughing.  Suspect viral.  Patient states her symptoms are beginning to improve today.  Discussed supportive care.    Heart rate still elevated at 122.  Patient states he gets very anxious in a medical care setting.  She states she does check her pulse and vitals at home.  She states she checked her heart rate this morning and was normal.  She thinks her elevated heart rate currently is just due to anxiety from being at the doctor's office.    She does not appear to be in any distress.  Will discharge home.  Patient to follow-up with primary care.  Advised go to emergency department if  Patient seen and examined at bedside  No acute events noted overnight  Case discussed with medical team    HPI:  89 yo M from home with PMhx HTN, HLD, MI X 2, CHF of unspecified EF, Prostate Cx (?w/ mets), who p/w several days difficulty breathing, sob, productive cough of white sputum, increasing abdominal distention, and pedal edema.  Pt recently stopped taking diuretics 2/2 adverse effects.  In ED, pt w/ s/s fluid overload, with increasing work of breathing, s/p lasix, vasotec, and put on BiPAP. (2018 14:54)      PAST MEDICAL & SURGICAL HISTORY:  HLD (hyperlipidemia)  HTN (hypertension)  Cancer of prostate  MI, old:  &amp;       penicillin (Unknown)       MEDICATIONS  (STANDING):  ALBUTerol/ipratropium for Nebulization 3 milliLiter(s) Nebulizer every 6 hours  ATENolol  Tablet 12.5 milliGRAM(s) Oral daily  atorvastatin 20 milliGRAM(s) Oral at bedtime  buDESOnide   0.5 milliGRAM(s) Respule 0.5 milliGRAM(s) Inhalation every 12 hours  docusate sodium 100 milliGRAM(s) Oral two times a day  furosemide   Injectable 40 milliGRAM(s) IV Push every 12 hours  gabapentin 300 milliGRAM(s) Oral two times a day  heparin  Injectable 5000 Unit(s) SubCutaneous every 12 hours  multivitamin/minerals 1 Tablet(s) Oral daily  pantoprazole    Tablet 40 milliGRAM(s) Oral before breakfast  rivaroxaban 15 milliGRAM(s) Oral <User Schedule>  tamsulosin 0.8 milliGRAM(s) Oral daily    MEDICATIONS  (PRN):  acetaminophen   Tablet. 650 milliGRAM(s) Oral every 6 hours PRN Mild Pain (1 - 3)  triazolam 0.25 milliGRAM(s) Oral at bedtime PRN Insomnia      REVIEW OF SYSTEMS:  CONSTITUTIONAL: (+) malaise.   EYES: No acute change in vision   ENT:  No tinnitus  NECK: No stiffness  RESPIRATORY: No hemoptysis  CARDIOVASCULAR: No chest pain, palpitations, syncope  GASTROINTESTINAL: No hematemesis, diarrhea, melena, or hematochezia.  GENITOURINARY: No hematuria  NEUROLOGICAL: No headaches  LYMPH Nodes: No enlarged glands  ENDOCRINE: No heat or cold intolerance	    T(C): 36.4 (04-15-18 @ 11:38), Max: 36.7 (18 @ 16:57)  HR: 71 (04-15-18 @ 11:38) (71 - 98)  BP: 92/54 (04-15-18 @ 11:38) (92/54 - 123/60)  RR: 18 (04-15-18 @ 11:38) (18 - 22)  SpO2: 95% (04-15-18 @ 11:38) (94% - 100%)    PHYSICAL EXAMINATION:   Constitutional: WD, NAD  HEENT: NC, AT  Neck:  Supple  Respiratory:  rales, rhonchi. Adequate airflow b/l. Not using accessory muscles of respiration.  Cardiovascular:  S1 & S2 intact, no R/G, 2+ radial pulses b/l  Gastrointestinal: Soft, NT, ND, normoactive b.s., no organomegaly/RT/rigidity  Extremities: WWP  Neurological:  Alert and awake.  Crude sensation intact.     Labs and imaging reviewed    LABS:                        10.8   4.8   )-----------( 215      ( 2018 11:53 )             33.7     -    139  |  105  |  28<H>  ----------------------------<  156<H>  4.2   |  23  |  1.42<H>    Ca    8.6      2018 11:53    TPro  6.1  /  Alb  3.0<L>  /  TBili  0.4  /  DBili  x   /  AST  41<H>  /  ALT  26  /  AlkPhos  95      CARDIAC MARKERS ( 15 Apr 2018 01:47 )  x     / 0.02 ng/mL / 162 U/L / x     / 2.9 ng/mL  CARDIAC MARKERS ( 2018 19:25 )  x     / 0.03 ng/mL / 143 U/L / x     / 2.5 ng/mL  CARDIAC MARKERS ( 2018 11:53 )  x     / <0.01 ng/mL / x     / x     / x            Urinalysis Basic - ( 2018 21:55 )    Color: Yellow / Appearance: Clear / S.010 / pH: x  Gluc: x / Ketone: Negative  / Bili: Negative / Urobili: Negative mg/dL   Blood: x / Protein: Negative mg/dL / Nitrite: Negative   Leuk Esterase: Small / RBC: 14 /HPF / WBC 6 /HPF   Sq Epi: x / Non Sq Epi: 0 /HPF / Bacteria: Negative      CAPILLARY BLOOD GLUCOSE            LIVER FUNCTIONS - ( 2018 11:53 )  Alb: 3.0 g/dL / Pro: 6.1 g/dL / ALK PHOS: 95 U/L / ALT: 26 U/L RC / AST: 41 U/L / GGT: x               RADIOLOGY & ADDITIONAL STUDIES: symptoms worsen or new problems develop.    FINAL IMPRESSION    ED Diagnosis        Final diagnosis    Viral URI with cough     Associated Orders          POCT RAPID STREP A     STREPTOCOCCUS GROUP A (STREPTOCOCCUS PYOGENES), BACTERIAL CULTURE

## 2021-12-28 NOTE — CONSULT NOTE ADULT - ASSESSMENT
Mr Bishop is a 89 year old man with medical history of prostate cancer, HTN, HLD, Afib, and dementia who is currently admitted to medicine for coronavirus and altered mental status. ID has been consulted for possible bacterial pneumonia. The patient is clinically well appearing, does not have a leukocytosis, and has been afebrile since arriving to the ED. Chest Xray shows atelectasis v pneumonia.     Coronavirus, unlikely to have superimposed bacterial infection at this time  - Monitor off antibiotics for now  - Check procalcitonin  - Follow up cultures  - Monitor for fevers  - Trend WBCs    Aida Rivera, PGY-4  Infectious Disease Fellow   Pager: [phone]  After 5pm/weekends: [phone] Mr Bishop is a 89 year old man with medical history of prostate cancer, HTN, HLD, Afib, and dementia who is currently admitted to medicine for coronavirus and altered mental status. ID has been consulted for possible bacterial pneumonia. The patient is clinically well appearing, does not have a leukocytosis, and has been afebrile since arriving to the ED. Chest Xray shows atelectasis v pneumonia.     Coronavirus, unlikely to have superimposed bacterial infection at this time  - Start ceftriaxone 1g daily  - Start Flagyl 500mg q 8 hours  - Check urine legionella  - Check procalcitonin  - Follow up cultures  - Monitor for fevers  - Trend WBCs    Aida Rivera, PGY-4  Infectious Disease Fellow   Pager: [phone]  After 5pm/weekends: [phone] Mr Bishop is a 89 year old man with medical history of prostate cancer, HTN, HLD, Afib, and dementia who is currently admitted to medicine for coronavirus and altered mental status. ID has been consulted for possible bacterial pneumonia. The patient is clinically well appearing, does not have a leukocytosis, and has been afebrile since arriving to the ED. Chest Xray shows atelectasis v pneumonia.   Procalcitonin 0.07    Overall, Coronavirus URI, Abnormal Chest CT    - Recommend monitoring off of antibiotics  - Follow up cultures  - Monitor for fevers  - Trend WBCs    Aida Rivera, PGY-4  Infectious Disease Fellow   Pager: [phone]  After 5pm/weekends: [phone] Yes

## 2022-06-06 NOTE — PHYSICAL EXAM
Caller verified medication dosage, frequency and dispense quantity. The medication(s) are set up as pending and waiting for your approval. The preferred pharmacy has been set up and verified.      Refill is for MELOXICAM 7.5 MG TABS   [General Appearance - Well Developed] : well developed [Normal Appearance] : normal appearance [Well Groomed] : well groomed [General Appearance - Well Nourished] : well nourished [No Deformities] : no deformities [General Appearance - In No Acute Distress] : no acute distress [Normal Conjunctiva] : the conjunctiva exhibited no abnormalities [Eyelids - No Xanthelasma] : the eyelids demonstrated no xanthelasmas [Normal Oral Mucosa] : normal oral mucosa [No Oral Pallor] : no oral pallor [No Oral Cyanosis] : no oral cyanosis [Normal Jugular Venous A Waves Present] : normal jugular venous A waves present [Normal Jugular Venous V Waves Present] : normal jugular venous V waves present [No Jugular Venous Rabago A Waves] : no jugular venous rabago A waves [Respiration, Rhythm And Depth] : normal respiratory rhythm and effort [Exaggerated Use Of Accessory Muscles For Inspiration] : no accessory muscle use [Auscultation Breath Sounds / Voice Sounds] : lungs were clear to auscultation bilaterally [Heart Rate And Rhythm] : heart rate and rhythm were normal [Heart Sounds] : normal S1 and S2 [Murmurs] : no murmurs present [Abdomen Soft] : soft [Abdomen Tenderness] : non-tender [Abdomen Mass (___ Cm)] : no abdominal mass palpated [Nail Clubbing] : no clubbing of the fingernails [Cyanosis, Localized] : no localized cyanosis [Petechial Hemorrhages (___cm)] : no petechial hemorrhages [Skin Color & Pigmentation] : normal skin color and pigmentation [] : no rash [No Venous Stasis] : no venous stasis [Skin Lesions] : no skin lesions [No Skin Ulcers] : no skin ulcer [No Xanthoma] : no  xanthoma was observed [Oriented To Time, Place, And Person] : oriented to person, place, and time [Affect] : the affect was normal [Mood] : the mood was normal [No Anxiety] : not feeling anxious [Skin Turgor] : normal skin turgor [FreeTextEntry1] : Does not seem volume depleted

## 2022-10-28 NOTE — BEHAVIORAL HEALTH ASSESSMENT NOTE - NSBHPSYCHHX_PSY_A_CORE
Anesthesia Pre Eval Note    Anesthesia ROS/Med Hx        Anesthetic Complication History:  Patient does not have a history of anesthetic complications      Pulmonary Review:  Patient does not have a pulmonary history    Asthma: excercise induced.    Neuro/Psych Review:  Patient does not have a neuro/psych history       Cardiovascular Review:  Patient does not have a cardiovascular history   Exercise tolerance: good (>4 METS)    GI/HEPATIC/RENAL Review:  Patient does not have a GI/hepatic/renalhistory       End/Other Review:  Patient does not have an endo/other history    Additional Results:     ALLERGIES:   -- Iodinated Diagnostic Agents -- HIVES and PRURITUS    --  Hives/itching to face/arms. No airway involvement.             Occurred after scan completed and dressed to go             home.  Must be premedicated for any contrast CTs             in the future.       No results found for: WBC, RBC, HGB, HCT, MCV, MCH, MCHC, RDWCV, SODIUM, POTASSIUM, CHLORIDE, CO2, GLUCOSE, BUN, CREATININE, GFRESTIMATE, EGFRNONAFR, GFRA, GFRNA, CALCIUM, HCG, PLT, PTT, INR   Past Medical History:  No date: Allergy  No date: RAD (reactive airway disease)    Past Surgical History:  No date: Egd  No date: Hernia repair  No date: Shoulder surgery; Right      Comment:  twice       Prior to Admission medications :  Medication Sodium Sulfate-Mag Sulfate-KCl 2664-180-372 MG Tab, Sig Take 12 tablets by mouth as directed. See Colonoscopy Instructions., Start Date 6/29/22, End Date , Taking? , Authorizing Provider Joe Emery MD    Medication OMEPRAZOLE PO, Sig , Start Date , End Date , Taking? , Authorizing Provider Outside Provider    Medication fexofenadine (ALLEGRA) 180 MG tablet, Sig , Start Date , End Date , Taking? , Authorizing Provider Outside Provider    Medication albuterol 108 (90 Base) MCG/ACT inhaler, Sig Inhale 2 puffs into the lungs., Start Date 5/13/15, End Date , Taking? , Authorizing Provider Outside Provider    Medication  fluticasone (FLONASE) 50 MCG/ACT nasal spray, Sig Spray 2 sprays in each nostril., Start Date 5/13/15, End Date , Taking? , Authorizing Provider Outside Provider         Patient Vitals in the past 24 hrs:      Relevant Problems   No relevant active problems       Physical Exam     Airway   Mallampati: II  TM Distance: >3 FB  Neck ROM: Full  Neck: Non-tender and Able to place in sniff position  TMJ Mobility: Good    Cardiovascular  Cardiovascular exam normal  Cardio Rhythm: Regular  Cardio Rate: Normal    Head Assessment  Head assessment: Normocephalic and Atraumatic    General Assessment  General Assessment: Alert and oriented and No acute distress    Dental Exam  Dental exam normal  Patient has:  Denied broken/chipped/loose teeth    Pulmonary Exam  Pulmonary exam normal  Breath sounds clear to auscultation:  Yes    Abdominal Exam  Abdominal exam normal      Anesthesia Plan:    ASA Status: 2  Anesthesia Type: MAC    Induction: Intravenous  Maintenance: TIVA    Post-op Pain Management: Per Surgeon      Checklist  Reviewed: NPO Status, Allergies, Medications, Problem list, Past Med History and Patient Summary  Consent/Risks Discussed Statement:  The proposed anesthetic plan, including its risks and benefits, have been discussed with the Patient along with the risks and benefits of alternatives. Questions were encouraged and answered and the patient and/or representative understands and agrees to proceed.        I discussed with the patient (and/or patient's legal representative) the risks and benefits of the proposed anesthesia plan, MAC, which may include services performed by other anesthesia providers.    Alternative anesthesia plans, if available, were reviewed with the patient (and/or patient's legal representative). Discussion has been held with the patient (and/or patient's legal representative) regarding risks of anesthesia, which include Intra-operative Awareness and emergent situations that may require  change in anesthesia plan.    The patient (and/or patient's legal representative) has indicated understanding, his/her questions have been answered, and he/she wishes to proceed with the planned anesthetic.    Blood Products: Not Anticipated     yes...

## 2022-11-28 NOTE — ED PROVIDER NOTE - INTERPRETATION
Katie called. Last blood work sent over in incorrect tube, needs to be sent intube that has NO preservative. RBBB/normal sinus rhythm

## 2023-02-09 NOTE — PROGRESS NOTE ADULT - ASSESSMENT
Elderly with remote MI, stable CAD, no hx CHF, mild-mod AI and MR. Prostate CA rx with Lupron. Recent edema, atrial flutter. On anticoag with Xarelto 15 and rate control with beta-blocker. Issues with bradycardia prior. Now with influenza and bronchospasm, some CHF. Edema better with IV lasix but increasing BUN and Creatinine and no more edema. Helical Rim Advancement Flap Text: The defect edges were debeveled with a #15 blade scalpel.  Given the location of the defect and the proximity to free margins (helical rim) a double helical rim advancement flap was deemed most appropriate.  Using a sterile surgical marker, the appropriate advancement flaps were drawn incorporating the defect and placing the expected incisions between the helical rim and antihelix where possible.  The area thus outlined was incised through and through with a #15 scalpel blade.  With a skin hook and iris scissors, the flaps were gently and sharply undermined and freed up.

## 2024-01-25 NOTE — PHYSICAL THERAPY INITIAL EVALUATION ADULT - PATIENT/FAMILY AGREES WITH PLAN
yes
You can access the FollowMyHealth Patient Portal offered by [facility] [place] by registering at the following website: [email or web address]. By joining [facility]’s FollowMyHealth portal, you will also be able to view your health information using other applications (apps) compatible with our system.

## 2024-02-12 NOTE — DISCHARGE NOTE ADULT - VISION (WITH CORRECTIVE LENSES IF THE PATIENT USUALLY WEARS THEM):
Called pt per provider request. Pt did have a death in the family and forgot to complete her labs. Pt did say she will be going sometime next week. I voiced to pt as long as they are completed before you are due for next refills. Pt verbalized understanding with no further questions at this time.    Normal vision: sees adequately in most situations; can see medication labels, newsprint

## 2024-12-04 NOTE — PATIENT PROFILE ADULT - ABILITY TO HEAR (WITH HEARING AID OR HEARING APPLIANCE IF NORMALLY USED):
Mildly to Moderately Impaired: difficulty hearing in some environments or speaker may need to increase volume or speak distinctly 97.5

## 2025-06-04 NOTE — CONSULT NOTE ADULT - PROBLEM SELECTOR RECOMMENDATION 5
Initially from Lupron. Now ?CHF. Diuresed. No edema today. Decrease IV lasix re: renal function. Ambulatory